# Patient Record
Sex: FEMALE | Race: WHITE | Employment: FULL TIME | URBAN - METROPOLITAN AREA
[De-identification: names, ages, dates, MRNs, and addresses within clinical notes are randomized per-mention and may not be internally consistent; named-entity substitution may affect disease eponyms.]

---

## 2017-02-06 ENCOUNTER — OFFICE VISIT (OUTPATIENT)
Dept: GYNECOLOGY | Age: 62
End: 2017-02-06

## 2017-02-06 VITALS
SYSTOLIC BLOOD PRESSURE: 133 MMHG | DIASTOLIC BLOOD PRESSURE: 68 MMHG | WEIGHT: 227 LBS | BODY MASS INDEX: 44.33 KG/M2 | HEART RATE: 80 BPM

## 2017-02-06 DIAGNOSIS — Z01.419 ENCOUNTER FOR ROUTINE GYNECOLOGICAL EXAMINATION WITH PAPANICOLAOU SMEAR OF CERVIX: Primary | ICD-10-CM

## 2017-02-06 DIAGNOSIS — R10.2 PELVIC PAIN IN FEMALE: ICD-10-CM

## 2017-02-06 DIAGNOSIS — N93.9 VAGINA BLEEDING: ICD-10-CM

## 2017-02-06 PROCEDURE — 99213 OFFICE O/P EST LOW 20 MIN: CPT | Performed by: OBSTETRICS & GYNECOLOGY

## 2017-02-13 ENCOUNTER — HOSPITAL ENCOUNTER (OUTPATIENT)
Dept: ULTRASOUND IMAGING | Age: 62
Discharge: HOME | End: 2017-02-13
Attending: OBSTETRICS & GYNECOLOGY | Admitting: OBSTETRICS & GYNECOLOGY

## 2017-02-13 DIAGNOSIS — R10.2 PELVIC PAIN IN FEMALE: ICD-10-CM

## 2017-02-13 DIAGNOSIS — N93.9 VAGINA BLEEDING: ICD-10-CM

## 2017-02-20 ENCOUNTER — OFFICE VISIT (OUTPATIENT)
Dept: GYNECOLOGY | Age: 62
End: 2017-02-20

## 2017-02-20 VITALS
DIASTOLIC BLOOD PRESSURE: 66 MMHG | SYSTOLIC BLOOD PRESSURE: 130 MMHG | BODY MASS INDEX: 44.14 KG/M2 | WEIGHT: 226 LBS | HEART RATE: 88 BPM

## 2017-02-20 DIAGNOSIS — N95.0 POSTMENOPAUSAL BLEEDING: Primary | ICD-10-CM

## 2017-02-20 PROCEDURE — 99213 OFFICE O/P EST LOW 20 MIN: CPT | Performed by: OBSTETRICS & GYNECOLOGY

## 2017-02-20 RX ORDER — IBUPROFEN 200 MG
200 TABLET ORAL EVERY 6 HOURS PRN
COMMUNITY
End: 2018-08-16

## 2017-03-09 ENCOUNTER — HOSPITAL ENCOUNTER (OUTPATIENT)
Dept: SURGERY | Age: 62
Discharge: HOME | End: 2017-03-09
Attending: OBSTETRICS & GYNECOLOGY | Admitting: OBSTETRICS & GYNECOLOGY

## 2017-03-09 VITALS
BODY MASS INDEX: 44.17 KG/M2 | TEMPERATURE: 97.5 F | WEIGHT: 225 LBS | HEIGHT: 60 IN | DIASTOLIC BLOOD PRESSURE: 68 MMHG | HEART RATE: 73 BPM | OXYGEN SATURATION: 98 % | SYSTOLIC BLOOD PRESSURE: 117 MMHG | RESPIRATION RATE: 16 BRPM

## 2017-03-09 PROCEDURE — 58558 HYSTEROSCOPY BIOPSY: CPT | Performed by: OBSTETRICS & GYNECOLOGY

## 2017-03-09 RX ORDER — MEPERIDINE HYDROCHLORIDE 25 MG/ML
12.5 INJECTION INTRAMUSCULAR; INTRAVENOUS; SUBCUTANEOUS EVERY 5 MIN PRN
Status: DISCONTINUED | OUTPATIENT
Start: 2017-03-09 | End: 2017-03-10 | Stop reason: HOSPADM

## 2017-03-09 RX ORDER — HYDROCODONE BITARTRATE AND ACETAMINOPHEN 5; 325 MG/1; MG/1
1 TABLET ORAL EVERY 6 HOURS PRN
Qty: 30 TABLET | Refills: 0 | Status: SHIPPED | OUTPATIENT
Start: 2017-03-09 | End: 2018-08-16

## 2017-03-09 RX ORDER — ONDANSETRON 2 MG/ML
4 INJECTION INTRAMUSCULAR; INTRAVENOUS
Status: ACTIVE | OUTPATIENT
Start: 2017-03-09 | End: 2017-03-09

## 2017-03-09 RX ORDER — SODIUM CHLORIDE, SODIUM LACTATE, POTASSIUM CHLORIDE, CALCIUM CHLORIDE 600; 310; 30; 20 MG/100ML; MG/100ML; MG/100ML; MG/100ML
INJECTION, SOLUTION INTRAVENOUS CONTINUOUS
Status: DISCONTINUED | OUTPATIENT
Start: 2017-03-09 | End: 2017-03-10 | Stop reason: HOSPADM

## 2017-03-09 RX ORDER — HYDROCODONE BITARTRATE AND ACETAMINOPHEN 5; 325 MG/1; MG/1
1 TABLET ORAL
Status: ACTIVE | OUTPATIENT
Start: 2017-03-09 | End: 2017-03-09

## 2017-03-09 RX ORDER — HYDROMORPHONE HCL 110MG/55ML
0.5 PATIENT CONTROLLED ANALGESIA SYRINGE INTRAVENOUS EVERY 5 MIN PRN
Status: DISCONTINUED | OUTPATIENT
Start: 2017-03-09 | End: 2017-03-10 | Stop reason: HOSPADM

## 2017-03-09 RX ORDER — HYDROMORPHONE HCL 110MG/55ML
0.25 PATIENT CONTROLLED ANALGESIA SYRINGE INTRAVENOUS EVERY 5 MIN PRN
Status: DISCONTINUED | OUTPATIENT
Start: 2017-03-09 | End: 2017-03-10 | Stop reason: HOSPADM

## 2017-03-09 RX ORDER — LIDOCAINE HYDROCHLORIDE 10 MG/ML
1 INJECTION, SOLUTION EPIDURAL; INFILTRATION; INTRACAUDAL; PERINEURAL
Status: COMPLETED | OUTPATIENT
Start: 2017-03-09 | End: 2017-03-09

## 2017-03-09 RX ORDER — SODIUM CHLORIDE 9 MG/ML
INJECTION, SOLUTION INTRAVENOUS CONTINUOUS
Status: DISCONTINUED | OUTPATIENT
Start: 2017-03-09 | End: 2017-03-10 | Stop reason: HOSPADM

## 2017-03-09 RX ADMIN — Medication 0.5 MG: at 15:37

## 2017-03-09 RX ADMIN — Medication 0.5 MG: at 15:23

## 2017-03-09 RX ADMIN — LIDOCAINE HYDROCHLORIDE 0.2 ML: 10 INJECTION, SOLUTION EPIDURAL; INFILTRATION; INTRACAUDAL; PERINEURAL at 13:26

## 2017-03-09 RX ADMIN — SODIUM CHLORIDE, SODIUM LACTATE, POTASSIUM CHLORIDE, CALCIUM CHLORIDE: 600; 310; 30; 20 INJECTION, SOLUTION INTRAVENOUS at 13:26

## 2017-03-09 ASSESSMENT — PAIN SCALES - GENERAL
PAINLEVEL_OUTOF10: 10
PAINLEVEL_OUTOF10: 10
PAINLEVEL_OUTOF10: 9
PAINLEVEL_OUTOF10: 3
PAINLEVEL_OUTOF10: 10
PAINLEVEL_OUTOF10: 3

## 2017-03-09 ASSESSMENT — PAIN - FUNCTIONAL ASSESSMENT: PAIN_FUNCTIONAL_ASSESSMENT: 0-10

## 2017-04-10 ENCOUNTER — OFFICE VISIT (OUTPATIENT)
Dept: GYNECOLOGY | Age: 62
End: 2017-04-10

## 2017-04-10 VITALS
WEIGHT: 231.6 LBS | DIASTOLIC BLOOD PRESSURE: 72 MMHG | SYSTOLIC BLOOD PRESSURE: 128 MMHG | HEART RATE: 86 BPM | BODY MASS INDEX: 45.23 KG/M2

## 2017-04-10 DIAGNOSIS — Z98.890 POST-OPERATIVE STATE: Primary | ICD-10-CM

## 2017-04-10 PROCEDURE — 99024 POSTOP FOLLOW-UP VISIT: CPT | Performed by: OBSTETRICS & GYNECOLOGY

## 2017-06-05 ENCOUNTER — OFFICE VISIT (OUTPATIENT)
Dept: GYNECOLOGY | Age: 62
End: 2017-06-05

## 2017-06-05 VITALS
BODY MASS INDEX: 45.7 KG/M2 | WEIGHT: 234 LBS | DIASTOLIC BLOOD PRESSURE: 59 MMHG | SYSTOLIC BLOOD PRESSURE: 127 MMHG | HEART RATE: 80 BPM

## 2017-06-05 DIAGNOSIS — Z01.419 ENCOUNTER FOR ROUTINE GYNECOLOGICAL EXAMINATION WITH PAPANICOLAOU SMEAR OF CERVIX: Primary | ICD-10-CM

## 2017-06-05 LAB
HEMOCCULT STL QL: NEGATIVE
HEMOCCULT STL QL: NORMAL
HEMOCCULT STL QL: NORMAL

## 2017-06-05 PROCEDURE — 99396 PREV VISIT EST AGE 40-64: CPT | Performed by: OBSTETRICS & GYNECOLOGY

## 2017-06-08 LAB
HPV COMMENT: NORMAL
HPV TYPE 16: NOT DETECTED
HPV TYPE 18: NOT DETECTED
HPVOH (OTHER TYPES): NOT DETECTED

## 2017-06-09 LAB
C. TRACHOMATIS DNA,THIN PREP: NEGATIVE
N. GONORRHOEAE DNA, THIN PREP: NEGATIVE

## 2018-07-02 ENCOUNTER — HOSPITAL ENCOUNTER (OUTPATIENT)
Dept: OTHER | Age: 63
Discharge: HOME | End: 2018-07-31
Attending: OBSTETRICS & GYNECOLOGY | Admitting: OBSTETRICS & GYNECOLOGY

## 2018-07-02 ENCOUNTER — OFFICE VISIT (OUTPATIENT)
Dept: GYNECOLOGY | Age: 63
End: 2018-07-02

## 2018-07-02 VITALS
WEIGHT: 216.6 LBS | SYSTOLIC BLOOD PRESSURE: 126 MMHG | DIASTOLIC BLOOD PRESSURE: 69 MMHG | HEART RATE: 68 BPM | BODY MASS INDEX: 42.3 KG/M2

## 2018-07-02 DIAGNOSIS — Z01.419 WELL WOMAN EXAM WITH ROUTINE GYNECOLOGICAL EXAM: Primary | ICD-10-CM

## 2018-07-02 DIAGNOSIS — N95.0 POSTMENOPAUSAL BLEEDING: ICD-10-CM

## 2018-07-02 PROCEDURE — 99396 PREV VISIT EST AGE 40-64: CPT | Performed by: OBSTETRICS & GYNECOLOGY

## 2018-07-02 NOTE — PROGRESS NOTES
Subjective:      Patient ID: Yolanda Kurtz is a 65 y.o. female. Other     pts here for annual gyn exam.  Notes spotting for 2 days last month. Denies other complaints.  used. Review of Systems  Pertinent review of systems items discussed above. All others systems items not discussed above were negative. Objective:   Physical Exam   Constitutional: She is oriented to person, place, and time. She appears well-developed and well-nourished. HENT:   Head: Normocephalic and atraumatic. Neck: No tracheal deviation present. No thyromegaly present. Cardiovascular: Normal rate, regular rhythm and normal heart sounds. No murmur heard. Pulmonary/Chest: Effort normal and breath sounds normal. No respiratory distress. She has no wheezes. She has no rales. Right breast exhibits no mass, no nipple discharge and no skin change. Left breast exhibits no mass, no nipple discharge and no skin change. Abdominal: Soft. She exhibits no distension and no mass. There is no tenderness. There is no rebound. Genitourinary: Rectum normal, vagina normal and uterus normal. Rectal exam shows no external hemorrhoid, no mass and guaiac negative stool. No breast tenderness (no masses), discharge or bleeding. There is no lesion on the right labia. There is no lesion on the left labia. Uterus is not deviated, not enlarged, not fixed and not tender. Cervix exhibits no motion tenderness, no discharge and no friability. Right adnexum displays no mass and no tenderness. Left adnexum displays no mass and no tenderness. No foreign body in the vagina. No vaginal discharge found. Genitourinary Comments: Pap performed. Musculoskeletal: Normal range of motion. Lymphadenopathy:     She has no cervical adenopathy. Neurological: She is alert and oriented to person, place, and time. Assessment:      Normal gyn exam, postmen spotting      Plan:      Pelvic US. Call with results. Mammogram ordered.

## 2018-07-03 LAB — OCCULT BLOOD SCREENING: NORMAL

## 2018-07-10 ENCOUNTER — HOSPITAL ENCOUNTER (OUTPATIENT)
Dept: ULTRASOUND IMAGING | Age: 63
Discharge: HOME | End: 2018-07-10
Attending: OBSTETRICS & GYNECOLOGY | Admitting: OBSTETRICS & GYNECOLOGY

## 2018-07-10 DIAGNOSIS — Z12.31 ENCOUNTER FOR SCREENING MAMMOGRAM FOR BREAST CANCER: ICD-10-CM

## 2018-07-10 DIAGNOSIS — N95.0 POSTMENOPAUSAL BLEEDING: ICD-10-CM

## 2018-07-23 ENCOUNTER — OFFICE VISIT (OUTPATIENT)
Dept: GYNECOLOGY | Age: 63
End: 2018-07-23

## 2018-07-23 VITALS
SYSTOLIC BLOOD PRESSURE: 120 MMHG | HEART RATE: 78 BPM | BODY MASS INDEX: 42.65 KG/M2 | DIASTOLIC BLOOD PRESSURE: 65 MMHG | WEIGHT: 218.4 LBS

## 2018-07-23 DIAGNOSIS — N95.0 POSTMENOPAUSAL BLEEDING: Primary | ICD-10-CM

## 2018-07-23 PROCEDURE — 99213 OFFICE O/P EST LOW 20 MIN: CPT | Performed by: OBSTETRICS & GYNECOLOGY

## 2018-07-23 RX ORDER — MISOPROSTOL 100 UG/1
100 TABLET ORAL 3 TIMES DAILY
Qty: 60 TABLET | Refills: 3 | Status: SHIPPED | OUTPATIENT
Start: 2018-07-23 | End: 2019-09-05 | Stop reason: ALTCHOICE

## 2018-07-23 NOTE — PROGRESS NOTES
pts here to discuss US showing stripe of 7 mm.  used. I recommended a hyst d and c with myosure. I discussed the technique, recovery, and risks with patient. They include but are not limited to bleeding, infection, damage to internal organs (e.g., bladder, bowel, ureters). Patient voiced understanding and agrees to proceed. Written h and p performed. rx cytotec. 15 min >50% of time was spent discussing findings, management, and treatment options.

## 2018-08-01 ENCOUNTER — HOSPITAL ENCOUNTER (OUTPATIENT)
Dept: OTHER | Age: 63
Discharge: HOME | End: 2018-08-31
Attending: OBSTETRICS & GYNECOLOGY | Admitting: OBSTETRICS & GYNECOLOGY

## 2018-08-16 ENCOUNTER — HOSPITAL ENCOUNTER (OUTPATIENT)
Dept: SURGERY | Age: 63
Discharge: HOME | End: 2018-08-16
Attending: OBSTETRICS & GYNECOLOGY | Admitting: OBSTETRICS & GYNECOLOGY

## 2018-08-16 VITALS
BODY MASS INDEX: 42.8 KG/M2 | RESPIRATION RATE: 16 BRPM | OXYGEN SATURATION: 99 % | DIASTOLIC BLOOD PRESSURE: 75 MMHG | SYSTOLIC BLOOD PRESSURE: 132 MMHG | TEMPERATURE: 98 F | HEIGHT: 60 IN | HEART RATE: 68 BPM | WEIGHT: 218 LBS

## 2018-08-16 DIAGNOSIS — N95.0 POSTMENOPAUSAL BLEEDING: Primary | ICD-10-CM

## 2018-08-16 LAB
GLUCOSE BLD-MCNC: 87 MG/DL (ref 70–99)
GLUCOSE BLD-MCNC: 88 MG/DL (ref 70–99)
PERFORMED ON: NORMAL
PERFORMED ON: NORMAL

## 2018-08-16 PROCEDURE — 58558 HYSTEROSCOPY BIOPSY: CPT | Performed by: OBSTETRICS & GYNECOLOGY

## 2018-08-16 RX ORDER — SODIUM CHLORIDE 9 MG/ML
INJECTION, SOLUTION INTRAVENOUS CONTINUOUS
Status: DISCONTINUED | OUTPATIENT
Start: 2018-08-16 | End: 2018-08-17 | Stop reason: HOSPADM

## 2018-08-16 RX ORDER — PREDNISONE 20 MG/1
20 TABLET ORAL DAILY
COMMUNITY
Start: 2018-08-02 | End: 2019-09-05 | Stop reason: ALTCHOICE

## 2018-08-16 RX ORDER — OXYCODONE HYDROCHLORIDE 5 MG/1
10 TABLET ORAL PRN
Status: ACTIVE | OUTPATIENT
Start: 2018-08-16 | End: 2018-08-16

## 2018-08-16 RX ORDER — OXYCODONE HYDROCHLORIDE AND ACETAMINOPHEN 5; 325 MG/1; MG/1
1 TABLET ORAL EVERY 6 HOURS PRN
Qty: 28 TABLET | Refills: 0 | Status: SHIPPED | OUTPATIENT
Start: 2018-08-16 | End: 2018-08-23

## 2018-08-16 RX ORDER — OXYCODONE HYDROCHLORIDE 5 MG/1
5 TABLET ORAL PRN
Status: ACTIVE | OUTPATIENT
Start: 2018-08-16 | End: 2018-08-16

## 2018-08-16 RX ORDER — HYDROMORPHONE HCL 110MG/55ML
0.5 PATIENT CONTROLLED ANALGESIA SYRINGE INTRAVENOUS EVERY 5 MIN PRN
Status: DISCONTINUED | OUTPATIENT
Start: 2018-08-16 | End: 2018-08-17 | Stop reason: HOSPADM

## 2018-08-16 RX ORDER — MEPERIDINE HYDROCHLORIDE 25 MG/ML
12.5 INJECTION INTRAMUSCULAR; INTRAVENOUS; SUBCUTANEOUS EVERY 5 MIN PRN
Status: DISCONTINUED | OUTPATIENT
Start: 2018-08-16 | End: 2018-08-17 | Stop reason: HOSPADM

## 2018-08-16 RX ORDER — ONDANSETRON 2 MG/ML
4 INJECTION INTRAMUSCULAR; INTRAVENOUS
Status: ACTIVE | OUTPATIENT
Start: 2018-08-16 | End: 2018-08-16

## 2018-08-16 RX ORDER — LIDOCAINE HYDROCHLORIDE 10 MG/ML
1 INJECTION, SOLUTION EPIDURAL; INFILTRATION; INTRACAUDAL; PERINEURAL
Status: ACTIVE | OUTPATIENT
Start: 2018-08-16 | End: 2018-08-16

## 2018-08-16 RX ORDER — ONDANSETRON 2 MG/ML
4 INJECTION INTRAMUSCULAR; INTRAVENOUS PRN
Status: DISCONTINUED | OUTPATIENT
Start: 2018-08-16 | End: 2018-08-17 | Stop reason: HOSPADM

## 2018-08-16 RX ORDER — HYDROMORPHONE HCL 110MG/55ML
0.25 PATIENT CONTROLLED ANALGESIA SYRINGE INTRAVENOUS EVERY 5 MIN PRN
Status: DISCONTINUED | OUTPATIENT
Start: 2018-08-16 | End: 2018-08-17 | Stop reason: HOSPADM

## 2018-08-16 RX ORDER — FENTANYL CITRATE 50 UG/ML
50 INJECTION, SOLUTION INTRAMUSCULAR; INTRAVENOUS EVERY 5 MIN PRN
Status: DISCONTINUED | OUTPATIENT
Start: 2018-08-16 | End: 2018-08-17 | Stop reason: HOSPADM

## 2018-08-16 RX ORDER — FENTANYL CITRATE 50 UG/ML
25 INJECTION, SOLUTION INTRAMUSCULAR; INTRAVENOUS EVERY 5 MIN PRN
Status: DISCONTINUED | OUTPATIENT
Start: 2018-08-16 | End: 2018-08-17 | Stop reason: HOSPADM

## 2018-08-16 RX ORDER — SODIUM CHLORIDE 0.9 % (FLUSH) 0.9 %
10 SYRINGE (ML) INJECTION EVERY 12 HOURS SCHEDULED
Status: DISCONTINUED | OUTPATIENT
Start: 2018-08-16 | End: 2018-08-17 | Stop reason: HOSPADM

## 2018-08-16 RX ORDER — SODIUM CHLORIDE 0.9 % (FLUSH) 0.9 %
10 SYRINGE (ML) INJECTION PRN
Status: DISCONTINUED | OUTPATIENT
Start: 2018-08-16 | End: 2018-08-17 | Stop reason: HOSPADM

## 2018-08-16 RX ORDER — LIDOCAINE HYDROCHLORIDE 10 MG/ML
0.5 INJECTION, SOLUTION EPIDURAL; INFILTRATION; INTRACAUDAL; PERINEURAL ONCE
Status: DISCONTINUED | OUTPATIENT
Start: 2018-08-16 | End: 2018-08-17 | Stop reason: HOSPADM

## 2018-08-16 RX ORDER — AMOXICILLIN AND CLAVULANATE POTASSIUM 875; 125 MG/1; MG/1
1 TABLET, FILM COATED ORAL 2 TIMES DAILY
COMMUNITY
Start: 2018-08-02 | End: 2019-09-05 | Stop reason: ALTCHOICE

## 2018-08-16 RX ORDER — MELOXICAM 15 MG/1
15 TABLET ORAL DAILY
COMMUNITY
Start: 2018-08-02 | End: 2019-09-05 | Stop reason: ALTCHOICE

## 2018-08-16 RX ORDER — SODIUM CHLORIDE, SODIUM LACTATE, POTASSIUM CHLORIDE, CALCIUM CHLORIDE 600; 310; 30; 20 MG/100ML; MG/100ML; MG/100ML; MG/100ML
INJECTION, SOLUTION INTRAVENOUS CONTINUOUS
Status: DISCONTINUED | OUTPATIENT
Start: 2018-08-16 | End: 2018-08-17 | Stop reason: HOSPADM

## 2018-08-16 RX ADMIN — FENTANYL CITRATE 25 MCG: 50 INJECTION, SOLUTION INTRAMUSCULAR; INTRAVENOUS at 15:53

## 2018-08-16 RX ADMIN — SODIUM CHLORIDE: 9 INJECTION, SOLUTION INTRAVENOUS at 13:25

## 2018-08-16 ASSESSMENT — PAIN - FUNCTIONAL ASSESSMENT: PAIN_FUNCTIONAL_ASSESSMENT: 0-10

## 2018-08-16 ASSESSMENT — PAIN DESCRIPTION - DESCRIPTORS: DESCRIPTORS: CRAMPING

## 2018-08-16 ASSESSMENT — PAIN SCALES - GENERAL: PAINLEVEL_OUTOF10: 5

## 2018-08-16 ASSESSMENT — PAIN DESCRIPTION - LOCATION: LOCATION: ABDOMEN

## 2018-08-16 ASSESSMENT — PAIN DESCRIPTION - PAIN TYPE: TYPE: SURGICAL PAIN

## 2018-08-16 NOTE — H&P
I have reviewed the history and physical and examined the patient and find no relevant changes. I have reviewed with the patient and/or family the risks, benefits, and alternatives to the procedure.     Reema Quigley MD  4/50/2047

## 2018-08-16 NOTE — PROGRESS NOTES
Received from or - drowsy,nasal cannula,abdomen soft non tender,rosario pad with stripe of red drainage no clots noted,nikki paws,vss.

## 2018-08-16 NOTE — ANESTHESIA PRE-OP
[facility] Anesthesiology  Pre-Anesthesia Evaluation/Consultation       Name:  Yeison Gonzalez                                         Age:  65 y.o. MRN:    [id number]           Procedure (Scheduled): DILATATION AND CURETTAGE HYSTEROSCOPY CAUTERY ABLATION   Surgeon:     Dr. Robinson Madrid MD     No Known Allergies  Patient Active Problem List   Diagnosis    Postmenopausal bleeding     No past medical history on file. Past Surgical History:   Procedure Laterality Date    BUNIONECTOMY      DILATION AND CURETTAGE OF UTERUS  4/2/12    Dilatation and curetage, hysteroscopy, endometrial biopsy    DILATION AND CURETTAGE OF UTERUS  03/09/2017    HYSTEROSCOPY DILATATION AND CURETTAGE MYOSURE    HYSTEROSCOPY       Social History   Substance Use Topics    Smoking status: Never Smoker    Smokeless tobacco: Never Used    Alcohol use No       Prior to Admission medications    Medication Sig Start Date End Date Taking? Authorizing Provider   misoprostol (CYTOTEC) 100 MCG tablet Take 1 tablet by mouth 3 times daily 6/55/93   Aletha Arce MD   naproxen (NAPROSYN) 500 MG tablet Take 500 mg by mouth 2 times daily (with meals)    Historical Provider, MD   HYDROcodone-acetaminophen (NORCO) 5-325 MG per tablet Take 1 tablet by mouth every 6 hours as needed for Pain (may change to pain pill ordered by surgeon) . 8/4/38   Aletha Arce MD   ibuprofen (ADVIL;MOTRIN) 200 MG tablet Take 200 mg by mouth every 6 hours as needed for Pain    Historical Provider, MD   aspirin 325 MG EC tablet Take 1 tablet by mouth every 6 hours as needed for Pain 7/25/15   DOUGLAS De La Cruz   guaiFENesin (ROBITUSSIN) 100 MG/5ML SOLN oral solution Take 10 mLs by mouth every 4 hours as needed for Cough. Historical Provider, MD   UNABLE TO FIND OTC VICKS COLD MED PRN    Historical Provider, MD   albuterol (PROAIR HFA) 108 (90 BASE) MCG/ACT inhaler Inhale 2 puffs into the lungs every 6 hours as needed for Shortness of Breath. GFRAA >60 07/25/2015    LABGLOM >60 07/25/2015    GLUCOSE 95 07/25/2015    CALCIUM 9.0 07/25/2015       BMP    Lab Results   Component Value Date     07/25/2015    K 4.7 07/25/2015     07/25/2015    CO2 28 07/25/2015    BUN 13 07/25/2015    CREATININE 0.6 07/25/2015    CALCIUM 9.0 07/25/2015    GFRAA >60 07/25/2015    LABGLOM >60 07/25/2015    GLUCOSE 95 07/25/2015       Coags   No results found for: PROTIME, INR, APTT    HCG (If Applicable) No results found for: PREGTESTUR, PREGSERUM, HCG, HCGQUANT     ABGs  No results found for: PHART, PO2ART, IRO4LNW, ZXZ4GEP, BEART, E5GVTWXZ     Type & Screen (If Applicable)  Lab Results   Component Value Date    LABABO B 04/02/2012    LABRH Positive 04/02/2012         POCGlucose  No results for input(s): GLUCOSE in the last 72 hours. NPO Status  > 8 hours                       BMI  There is no height or weight on file to calculate BMI. Estimated body mass index is 42.65 kg/m² as calculated from the following:    Height as of 3/9/17: 5' (1.524 m). Weight as of 7/23/18: 218 lb 6.4 oz (99.1 kg). Additional Testing (Echo, Stress, ECG, PFTs, etc)        Anesthesia Evaluation  Patient summary reviewed and Nursing notes reviewed no history of anesthetic complications:   Airway: Mallampati: II  TM distance: >3 FB   Neck ROM: full  Mouth opening: > = 3 FB Dental: normal exam         Pulmonary:Negative Pulmonary ROS and normal exam                               Cardiovascular:Negative CV ROS  Exercise tolerance: good (>4 METS),       (-)  angina, PND and  GUTIÉRREZ    ECG reviewed  Rhythm: regular  Rate: normal           Beta Blocker:  Not on Beta Blocker         Neuro/Psych:   Negative Neuro/Psych ROS              GI/Hepatic/Renal: Neg GI/Hepatic/Renal ROS            Endo/Other: Negative Endo/Other ROS                    Abdominal:           Vascular: negative vascular ROS.                                        Anesthesia Plan      general     ASA 1     (Plan for GETA

## 2018-08-16 NOTE — PROGRESS NOTES
Discharge instructions reviewed with patient/responsible adult. All home medications have been reviewed, questions answered and patient and friend verbalized understanding. Discharge instructions signed and copies given. Patient discharged per w/c with belongings.

## 2018-08-17 NOTE — OP NOTE
[facility]                      [facility], [address] [place] [address]                                 OPERATIVE REPORT    PATIENT NAME: Rashida Fu                       :        1955  MED REC NO:   [id number]                          ROOM:  ACCOUNT NO:   [de-identified]                          ADMIT DATE: 2018  PROVIDER:     Cayla Isbell MD    DATE OF PROCEDURE:  2018    INDICATION:  The patient is a 27-year-old female. She is  0. She  presents with postmenopausal bleeding for two days. Ultrasound showed an  endometrial stripe of 7.5 mm. I offered the patient the option of a  hysteroscope, D and C. I discussed the technique, the recovery and the  risks, they include but are not limited to bleeding, infection, damage to  her internal organs such as bladder and bowel, along with need for  subsequent reparative surgery if damage occurred. The patient voiced  understanding and agreed to proceed with the surgery. PREOPERATIVE DIAGNOSIS:  Postmenopausal bleeding. POSTOPERATIVE DIAGNOSIS:  Postmenopausal bleeding. OPERATION PERFORMED:  Hysteroscope, D and C using MyoSure. SURGEON:  Cayla Isbell MD    ANESTHESIA:  General endotracheal anesthetic. ESTIMATED BLOOD LOSS:  Less than 50 mL. FLUID DEFICIT:  Less than 200 mL. DRAINS:  None. COMPLICATIONS:  None. DISPOSITION:  Stable to recovery room. OPERATIVE PROCEDURE:  The patient was taken to the operating room and was  prepped and draped in normal sterile fashion in the dorsal lithotomy  position. Single-tooth tenaculum was used to grasp the anterior aspect of  the cervix. Uterus was sounded to 8 cm and cervix was dilated to 8 mm. Hysteroscope was inserted. There was a thin endometrial lining seen. I  was unable to appreciate any endometrial polyps. I was able to visualize  both tubal ostia.   Representative sampling of the entire endometrial cavity  was taken using the MyoSure under direct visualization. Good hemostasis  was noted. All instruments were removed from the patient's pelvic organ. She was taken out of lithotomy, allowed to awaken from anesthesia, after  which time she was transferred from the operating room to the recovery room  where she went in stable condition. All sponge, lap, and needles were  correct x2.         Wendy Glaser MD    D: 47/29/6792 15:30:42       T: 08/17/2018 2:18:08     [email or web address]  Job#: [id number]     Doc#: [id number]

## 2018-08-21 NOTE — ANESTHESIA POST-OP
Anesthesia Post-op Note    Patient: Tatyana Barrera  MRN: [id number]  YOB: 1955  Date of evaluation: 8/21/2018  Time:  9:17 AM     Procedure(s) Performed:     Last Vitals: /75   Pulse 68   Temp 98 °F (36.7 °C) (Temporal)   Resp 16   Ht 5' (1.524 m)   Wt 218 lb (98.9 kg)   SpO2 99%   BMI 42.58 kg/m²     Can Phase I: Can Score: 10    Can Phase II: Can Score: 10    Anesthesia Post Evaluation    Final anesthesia type: general  Patient participation: complete - patient participated  Level of consciousness: awake and alert  Pain score: 0  Airway patency: patent  Nausea & Vomiting: no nausea and no vomiting  Complications: no  Cardiovascular status: blood pressure returned to baseline  Respiratory status: acceptable  Hydration status: euvolemic        Ade Christine DO  9:17 AM

## 2018-10-01 ENCOUNTER — OFFICE VISIT (OUTPATIENT)
Dept: GYNECOLOGY | Age: 63
End: 2018-10-01

## 2018-10-01 VITALS
WEIGHT: 225.4 LBS | BODY MASS INDEX: 44.02 KG/M2 | HEART RATE: 76 BPM | SYSTOLIC BLOOD PRESSURE: 128 MMHG | DIASTOLIC BLOOD PRESSURE: 66 MMHG

## 2018-10-01 DIAGNOSIS — N95.0 POSTMENOPAUSAL BLEEDING: ICD-10-CM

## 2018-10-01 PROCEDURE — 99213 OFFICE O/P EST LOW 20 MIN: CPT | Performed by: OBSTETRICS & GYNECOLOGY

## 2018-10-01 PROCEDURE — 99211 OFF/OP EST MAY X REQ PHY/QHP: CPT | Performed by: OBSTETRICS & GYNECOLOGY

## 2019-08-26 ENCOUNTER — HOSPITAL ENCOUNTER (EMERGENCY)
Age: 64
Discharge: HOME | End: 2019-08-26
Attending: EMERGENCY MEDICINE
Payer: COMMERCIAL

## 2019-08-26 ENCOUNTER — APPOINTMENT (OUTPATIENT)
Dept: CT IMAGING | Age: 64
End: 2019-08-26
Payer: COMMERCIAL

## 2019-08-26 VITALS
OXYGEN SATURATION: 98 % | TEMPERATURE: 98.1 F | SYSTOLIC BLOOD PRESSURE: 134 MMHG | DIASTOLIC BLOOD PRESSURE: 71 MMHG | RESPIRATION RATE: 16 BRPM | HEART RATE: 67 BPM | BODY MASS INDEX: 47.12 KG/M2 | HEIGHT: 60 IN | WEIGHT: 240 LBS

## 2019-08-26 DIAGNOSIS — R51.9 ACUTE NONINTRACTABLE HEADACHE, UNSPECIFIED HEADACHE TYPE: Primary | ICD-10-CM

## 2019-08-26 LAB
A/G RATIO: 1.2 (ref 1.1–2.2)
ALBUMIN SERPL-MCNC: 4 G/DL (ref 3.4–5)
ALP BLD-CCNC: 84 U/L (ref 40–129)
ALT SERPL-CCNC: 7 U/L (ref 10–40)
ANION GAP SERPL CALCULATED.3IONS-SCNC: 11 MMOL/L (ref 3–16)
AST SERPL-CCNC: 17 U/L (ref 15–37)
BASOPHILS ABSOLUTE: 0.1 K/UL (ref 0–0.2)
BASOPHILS RELATIVE PERCENT: 1 %
BILIRUB SERPL-MCNC: <0.2 MG/DL (ref 0–1)
BUN BLDV-MCNC: 15 MG/DL (ref 7–20)
CALCIUM SERPL-MCNC: 8.8 MG/DL (ref 8.3–10.6)
CHLORIDE BLD-SCNC: 102 MMOL/L (ref 99–110)
CO2: 26 MMOL/L (ref 21–32)
CREAT SERPL-MCNC: 0.7 MG/DL (ref 0.6–1.2)
EOSINOPHILS ABSOLUTE: 0.1 K/UL (ref 0–0.6)
EOSINOPHILS RELATIVE PERCENT: 1 %
GFR AFRICAN AMERICAN: >60
GFR NON-AFRICAN AMERICAN: >60
GLOBULIN: 3.4 G/DL
GLUCOSE BLD-MCNC: 95 MG/DL (ref 70–99)
HCT VFR BLD CALC: 40.5 % (ref 36–48)
HEMOGLOBIN: 13 G/DL (ref 12–16)
LYMPHOCYTES ABSOLUTE: 1.8 K/UL (ref 1–5.1)
LYMPHOCYTES RELATIVE PERCENT: 29.3 %
MCH RBC QN AUTO: 26.5 PG (ref 26–34)
MCHC RBC AUTO-ENTMCNC: 32 G/DL (ref 31–36)
MCV RBC AUTO: 82.6 FL (ref 80–100)
MONOCYTES ABSOLUTE: 0.6 K/UL (ref 0–1.3)
MONOCYTES RELATIVE PERCENT: 8.9 %
NEUTROPHILS ABSOLUTE: 3.7 K/UL (ref 1.7–7.7)
NEUTROPHILS RELATIVE PERCENT: 59.8 %
PDW BLD-RTO: 15.3 % (ref 12.4–15.4)
PLATELET # BLD: 227 K/UL (ref 135–450)
PMV BLD AUTO: 8.9 FL (ref 5–10.5)
POTASSIUM SERPL-SCNC: 4.2 MMOL/L (ref 3.5–5.1)
RBC # BLD: 4.91 M/UL (ref 4–5.2)
SODIUM BLD-SCNC: 139 MMOL/L (ref 136–145)
TOTAL PROTEIN: 7.4 G/DL (ref 6.4–8.2)
WBC # BLD: 6.2 K/UL (ref 4–11)

## 2019-08-26 PROCEDURE — 96374 THER/PROPH/DIAG INJ IV PUSH: CPT

## 2019-08-26 PROCEDURE — 96375 TX/PRO/DX INJ NEW DRUG ADDON: CPT

## 2019-08-26 PROCEDURE — 99284 EMERGENCY DEPT VISIT MOD MDM: CPT

## 2019-08-26 PROCEDURE — 6360000002 HC RX W HCPCS: Performed by: PHYSICIAN ASSISTANT

## 2019-08-26 PROCEDURE — 80053 COMPREHEN METABOLIC PANEL: CPT

## 2019-08-26 PROCEDURE — 93005 ELECTROCARDIOGRAM TRACING: CPT | Performed by: PHYSICIAN ASSISTANT

## 2019-08-26 PROCEDURE — 6370000000 HC RX 637 (ALT 250 FOR IP): Performed by: PHYSICIAN ASSISTANT

## 2019-08-26 PROCEDURE — 70450 CT HEAD/BRAIN W/O DYE: CPT

## 2019-08-26 PROCEDURE — 85025 COMPLETE CBC W/AUTO DIFF WBC: CPT

## 2019-08-26 PROCEDURE — 2580000003 HC RX 258: Performed by: PHYSICIAN ASSISTANT

## 2019-08-26 PROCEDURE — 96361 HYDRATE IV INFUSION ADD-ON: CPT

## 2019-08-26 RX ORDER — 0.9 % SODIUM CHLORIDE 0.9 %
1000 INTRAVENOUS SOLUTION INTRAVENOUS ONCE
Status: COMPLETED | OUTPATIENT
Start: 2019-08-26 | End: 2019-08-26

## 2019-08-26 RX ORDER — DIPHENHYDRAMINE HYDROCHLORIDE 50 MG/ML
12.5 INJECTION INTRAMUSCULAR; INTRAVENOUS ONCE
Status: COMPLETED | OUTPATIENT
Start: 2019-08-26 | End: 2019-08-26

## 2019-08-26 RX ORDER — METOCLOPRAMIDE HYDROCHLORIDE 5 MG/ML
10 INJECTION INTRAMUSCULAR; INTRAVENOUS ONCE
Status: COMPLETED | OUTPATIENT
Start: 2019-08-26 | End: 2019-08-26

## 2019-08-26 RX ORDER — ACETAMINOPHEN 500 MG
1000 TABLET ORAL ONCE
Status: COMPLETED | OUTPATIENT
Start: 2019-08-26 | End: 2019-08-26

## 2019-08-26 RX ADMIN — SODIUM CHLORIDE 1000 ML: 9 INJECTION, SOLUTION INTRAVENOUS at 16:03

## 2019-08-26 RX ADMIN — DIPHENHYDRAMINE HYDROCHLORIDE 12.5 MG: 50 INJECTION, SOLUTION INTRAMUSCULAR; INTRAVENOUS at 16:03

## 2019-08-26 RX ADMIN — METOCLOPRAMIDE 10 MG: 5 INJECTION, SOLUTION INTRAMUSCULAR; INTRAVENOUS at 16:03

## 2019-08-26 RX ADMIN — ACETAMINOPHEN 1000 MG: 500 TABLET, FILM COATED ORAL at 15:51

## 2019-08-26 ASSESSMENT — ENCOUNTER SYMPTOMS
ABDOMINAL PAIN: 0
NAUSEA: 0
VOMITING: 0
RHINORRHEA: 0
SHORTNESS OF BREATH: 0
COUGH: 0
DIARRHEA: 0

## 2019-08-26 ASSESSMENT — PAIN DESCRIPTION - PAIN TYPE: TYPE: ACUTE PAIN

## 2019-08-26 ASSESSMENT — PAIN SCALES - GENERAL: PAINLEVEL_OUTOF10: 3

## 2019-08-26 ASSESSMENT — PAIN DESCRIPTION - LOCATION: LOCATION: HEAD

## 2019-08-26 NOTE — ED PROVIDER NOTES
intravenous contrast. Dose modulation, iterative reconstruction, and/or weight based adjustment of the mA/kV was utilized to reduce the radiation dose to as low as reasonably achievable. COMPARISON: 07/25/2015 HISTORY: ORDERING SYSTEM PROVIDED HISTORY: headache TECHNOLOGIST PROVIDED HISTORY: Has a \"code stroke\" or \"stroke alert\" been called? ->No Reason for Exam: Hypertension (Pt. in per squad with report of HA and high BP. Pt. didn't want transported per squad report.) Acuity: Acute Type of Exam: Initial FINDINGS: BRAIN/VENTRICLES: There is no acute intracranial hemorrhage, mass effect or midline shift. No abnormal extra-axial fluid collection. The gray-white differentiation is maintained without evidence of an acute infarct. There is no evidence of hydrocephalus. ORBITS: The visualized portion of the orbits demonstrate no acute abnormality. SINUSES: The visualized paranasal sinuses and mastoid air cells demonstrate no acute abnormality. SOFT TISSUES/SKULL:  No acute abnormality of the visualized skull or soft tissues. No acute intracranial abnormality.            PROCEDURES   Unless otherwise noted below, none     Procedures    CRITICAL CARE TIME   N/A    CONSULTS:  None      EMERGENCY DEPARTMENT COURSE and DIFFERENTIAL DIAGNOSIS/MDM:   Vitals:    Vitals:    08/26/19 1420 08/26/19 1609 08/26/19 1756   BP: (!) 147/82 137/77 134/71   Pulse: 80 72 67   Resp: 16  16   Temp: 98.1 °F (36.7 °C)     TempSrc: Oral     SpO2: 97% 98% 98%   Weight: 240 lb (108.9 kg)     Height: 5' (1.524 m)         Patient was given thefollowing medications:  Medications   0.9 % sodium chloride bolus (0 mLs Intravenous Stopped 8/26/19 1724)   acetaminophen (TYLENOL) tablet 1,000 mg (1,000 mg Oral Given 8/26/19 1551)   metoclopramide (REGLAN) injection 10 mg (10 mg Intravenous Given 8/26/19 1603)   diphenhydrAMINE (BENADRYL) injection 12.5 mg (12.5 mg Intravenous Given 8/26/19 1603)     The patient presents to the emergency department today for evaluation for a headache. The patient states that around 1 PM this afternoon she noticed that she had a \"burning\" sensation to her left ear into the left side of her face. The patient states that her blood pressure was taken at that time, and it was \"791\" systolic. The patient states that overall she is doing better, she is only rating her headache as a 3/10, this is not the worst headache of her life, this is not an atypical headache. She denies any numbness, tingling or weakness. She denies any chest pain or shortness of breath. She denies any abdominal pain, nausea, vomiting or diarrhea. She denies any fever chills but no cough or congestion. No urinary symptoms. No fall or head injury, she has no other complaints at this time. On physical exam, there are no focal neurological deficits. CBC shows no evidence of leukocytosis or anemia. CMP is unremarkable. EKG documented by my attending, please see his note for further details. CT of head shows no acute process. Patient was given fluids as well as medications in the ED, blood pressure discharge is 134/71. As this is not the worst headache of her life this is not an atypical headache, I do not feel that she needs any further work-up at this time, I believe this can be managed as an outpatient. Do not feel that she needs a CT/LP at this time as my suspicions at this time for acute internal hemorrhage, subarachnoid hemorrhage, epidural hematoma, subdural hematoma, meningitis, acute CVA, TIA or other emergent etiology. She is to obtain follow-up with her primary care physician within 2 to 3 days for reevaluation. She is to return to the ED for any new or worsening symptoms. Patient voiced understanding is agreeable plan. Stable for discharge      FINAL IMPRESSION      1.  Acute nonintractable headache, unspecified headache type          DISPOSITION/PLAN   DISPOSITION Decision To Discharge 08/26/2019 05:37:46 PM      PATIENT REFERREDTO:  Referring Not In System    Schedule an appointment as soon as possible for a visit in 2 days      [facility] Emergency Department  [address] [place] [address]  [phone]    As needed, If symptoms worsen      DISCHARGE MEDICATIONS:  Discharge Medication List as of 8/26/2019  6:03 PM          DISCONTINUED MEDICATIONS:  Discharge Medication List as of 8/26/2019  6:03 PM                 (Please note that portions ofthis note were completed with a voice recognition program.  Efforts were made to edit the dictations but occasionally words are mis-transcribed.)    Samantha Mckeon PA-C (electronically signed)           Samantha Mckeon PA-C  08/26/19 0632

## 2019-08-26 NOTE — ED PROVIDER NOTES
extra-axial fluid collection. The gray-white differentiation is maintained without evidence of an acute infarct. There is no evidence of hydrocephalus. ORBITS: The visualized portion of the orbits demonstrate no acute abnormality. SINUSES: The visualized paranasal sinuses and mastoid air cells demonstrate no acute abnormality. SOFT TISSUES/SKULL:  No acute abnormality of the visualized skull or soft tissues. No acute intracranial abnormality.         Josephine Thomas MD  08/26/19 3264

## 2019-08-27 LAB
EKG ATRIAL RATE: 79 BPM
EKG DIAGNOSIS: NORMAL
EKG P AXIS: 58 DEGREES
EKG P-R INTERVAL: 180 MS
EKG Q-T INTERVAL: 404 MS
EKG QRS DURATION: 80 MS
EKG QTC CALCULATION (BAZETT): 463 MS
EKG R AXIS: 26 DEGREES
EKG T AXIS: 27 DEGREES
EKG VENTRICULAR RATE: 79 BPM

## 2019-08-27 PROCEDURE — 93010 ELECTROCARDIOGRAM REPORT: CPT | Performed by: INTERNAL MEDICINE

## 2019-08-31 ENCOUNTER — HOSPITAL ENCOUNTER (OUTPATIENT)
Dept: WOMENS IMAGING | Age: 64
Discharge: HOME | End: 2019-08-31
Payer: COMMERCIAL

## 2019-08-31 DIAGNOSIS — Z12.31 ENCOUNTER FOR SCREENING MAMMOGRAM FOR BREAST CANCER: ICD-10-CM

## 2019-08-31 PROCEDURE — 77067 SCR MAMMO BI INCL CAD: CPT

## 2019-09-05 ENCOUNTER — ANESTHESIA EVENT (OUTPATIENT)
Dept: ENDOSCOPY | Age: 64
End: 2019-09-05
Payer: COMMERCIAL

## 2019-09-06 ENCOUNTER — HOSPITAL ENCOUNTER (OUTPATIENT)
Age: 64
Setting detail: OUTPATIENT SURGERY
Discharge: HOME | End: 2019-09-06
Attending: INTERNAL MEDICINE | Admitting: INTERNAL MEDICINE
Payer: COMMERCIAL

## 2019-09-06 ENCOUNTER — ANESTHESIA (OUTPATIENT)
Dept: ENDOSCOPY | Age: 64
End: 2019-09-06
Payer: COMMERCIAL

## 2019-09-06 VITALS
RESPIRATION RATE: 8 BRPM | OXYGEN SATURATION: 99 % | SYSTOLIC BLOOD PRESSURE: 112 MMHG | DIASTOLIC BLOOD PRESSURE: 73 MMHG

## 2019-09-06 VITALS
HEART RATE: 58 BPM | RESPIRATION RATE: 18 BRPM | OXYGEN SATURATION: 100 % | TEMPERATURE: 97.6 F | SYSTOLIC BLOOD PRESSURE: 114 MMHG | DIASTOLIC BLOOD PRESSURE: 65 MMHG | HEIGHT: 60 IN | WEIGHT: 240 LBS | BODY MASS INDEX: 47.12 KG/M2

## 2019-09-06 PROCEDURE — 2500000003 HC RX 250 WO HCPCS: Performed by: NURSE ANESTHETIST, CERTIFIED REGISTERED

## 2019-09-06 PROCEDURE — 6370000000 HC RX 637 (ALT 250 FOR IP): Performed by: INTERNAL MEDICINE

## 2019-09-06 PROCEDURE — 7100000011 HC PHASE II RECOVERY - ADDTL 15 MIN: Performed by: INTERNAL MEDICINE

## 2019-09-06 PROCEDURE — 2500000003 HC RX 250 WO HCPCS: Performed by: INTERNAL MEDICINE

## 2019-09-06 PROCEDURE — 3700000000 HC ANESTHESIA ATTENDED CARE: Performed by: INTERNAL MEDICINE

## 2019-09-06 PROCEDURE — 2580000003 HC RX 258: Performed by: ANESTHESIOLOGY

## 2019-09-06 PROCEDURE — 3700000001 HC ADD 15 MINUTES (ANESTHESIA): Performed by: INTERNAL MEDICINE

## 2019-09-06 PROCEDURE — 2709999900 HC NON-CHARGEABLE SUPPLY: Performed by: INTERNAL MEDICINE

## 2019-09-06 PROCEDURE — 6360000002 HC RX W HCPCS: Performed by: NURSE ANESTHETIST, CERTIFIED REGISTERED

## 2019-09-06 PROCEDURE — 7100000010 HC PHASE II RECOVERY - FIRST 15 MIN: Performed by: INTERNAL MEDICINE

## 2019-09-06 PROCEDURE — 3609009500 HC COLONOSCOPY DIAGNOSTIC OR SCREENING: Performed by: INTERNAL MEDICINE

## 2019-09-06 RX ORDER — LIDOCAINE HYDROCHLORIDE 20 MG/ML
INJECTION, SOLUTION INFILTRATION; PERINEURAL PRN
Status: DISCONTINUED | OUTPATIENT
Start: 2019-09-06 | End: 2019-09-06 | Stop reason: SDUPTHER

## 2019-09-06 RX ORDER — PROPOFOL 10 MG/ML
INJECTION, EMULSION INTRAVENOUS PRN
Status: DISCONTINUED | OUTPATIENT
Start: 2019-09-06 | End: 2019-09-06 | Stop reason: SDUPTHER

## 2019-09-06 RX ORDER — SODIUM CHLORIDE 9 MG/ML
INJECTION, SOLUTION INTRAVENOUS CONTINUOUS
Status: DISCONTINUED | OUTPATIENT
Start: 2019-09-06 | End: 2019-09-06 | Stop reason: HOSPADM

## 2019-09-06 RX ADMIN — PROPOFOL 50 MG: 10 INJECTION, EMULSION INTRAVENOUS at 07:50

## 2019-09-06 RX ADMIN — LIDOCAINE HYDROCHLORIDE 100 MG: 20 INJECTION, SOLUTION INFILTRATION; PERINEURAL at 07:50

## 2019-09-06 RX ADMIN — PROPOFOL 50 MG: 10 INJECTION, EMULSION INTRAVENOUS at 07:55

## 2019-09-06 RX ADMIN — PROPOFOL 50 MG: 10 INJECTION, EMULSION INTRAVENOUS at 07:52

## 2019-09-06 RX ADMIN — PROPOFOL 50 MG: 10 INJECTION, EMULSION INTRAVENOUS at 07:58

## 2019-09-06 RX ADMIN — SODIUM CHLORIDE: 9 INJECTION, SOLUTION INTRAVENOUS at 07:07

## 2019-09-06 ASSESSMENT — PAIN SCALES - GENERAL
PAINLEVEL_OUTOF10: 0

## 2019-09-06 ASSESSMENT — PAIN - FUNCTIONAL ASSESSMENT: PAIN_FUNCTIONAL_ASSESSMENT: 0-10

## 2019-09-06 ASSESSMENT — ENCOUNTER SYMPTOMS: SHORTNESS OF BREATH: 0

## 2019-09-06 NOTE — ANESTHESIA PRE PROCEDURE
Department of Anesthesiology  Preprocedure Note       Name:  Garland Livingston   Age:  59 y.o.  :  1955                                          MRN:  [id number]         Date:  2019      Surgeon: Kaela Pennington):  Mónica Lim MD    Procedure: COLONOSCOPY (N/A )    Medications prior to admission:   Prior to Admission medications    Not on File       Current medications:    Current Facility-Administered Medications   Medication Dose Route Frequency Provider Last Rate Last Dose    0.9 % sodium chloride infusion   Intravenous Continuous Diann Mitchell MD           Allergies:  No Known Allergies    Problem List:    Patient Active Problem List   Diagnosis Code    Postmenopausal bleeding N95.0       Past Medical History:        Diagnosis Date    Hypertension     not taking any meds       Past Surgical History:        Procedure Laterality Date    BUNIONECTOMY      DILATION AND CURETTAGE OF UTERUS  12    Dilatation and curetage, hysteroscopy, endometrial biopsy    DILATION AND CURETTAGE OF UTERUS  2017    HYSTEROSCOPY DILATATION AND CURETTAGE MYOSURE    HYSTEROSCOPY      HYSTEROSCOPY N/A 2018    HYSTEROSCOPY DILATATION AND CURETTAGE [id number] [address]       Social History:    Social History     Tobacco Use    Smoking status: Never Smoker    Smokeless tobacco: Never Used   Substance Use Topics    Alcohol use:  No                                Counseling given: Not Answered      Vital Signs (Current):   Vitals:    19 1159 19 0643   BP:  119/76   Pulse:  73   Resp:  16   Temp:  97.6 °F (36.4 °C)   TempSrc:  Temporal   SpO2:  100%   Weight: 240 lb (108.9 kg) 240 lb (108.9 kg)   Height: 5' (1.524 m) 5' (1.524 m)                                              BP Readings from Last 3 Encounters:   19 119/76   19 134/71   10/01/18 128/66       NPO Status: Time of last liquid consumption: 0130                        Time of last solid consumption: 1130                        Date

## 2020-07-21 ENCOUNTER — OFFICE VISIT (OUTPATIENT)
Dept: PRIMARY CARE CLINIC | Age: 65
End: 2020-07-21

## 2020-07-21 ENCOUNTER — HOSPITAL ENCOUNTER (OUTPATIENT)
Dept: WOMENS IMAGING | Age: 65
Discharge: HOME | End: 2020-07-21
Payer: COMMERCIAL

## 2020-07-21 PROCEDURE — 99211 OFF/OP EST MAY X REQ PHY/QHP: CPT | Performed by: NURSE PRACTITIONER

## 2020-07-21 NOTE — PATIENT INSTRUCTIONS
Steps to help prevent the spread of COVID-19 if you are sick  SOURCE - https://rehman-goyal.info/. html     Stay home except to get medical care   ; Stay home: People who are mildly ill with COVID-19 are able to isolate at home during their illness. You should restrict activities outside your home, except for getting medical care.   ; Avoid public areas: Do not go to work, school, or public areas.   ; Avoid public transportation: Avoid using public transportation, ride-sharing, or taxis.  ; Separate yourself from other people and animals in your home   ; Stay away from others: As much as possible, you should stay in a specific room and away from other people in your home. Also, you should use a separate bathroom, if available.   ; Limit contact with pets & animals: You should restrict contact with pets and other animals while you are sick with COVID-19, just like you would around other people. Although there have not been reports of pets or other animals becoming sick with COVID-19, it is still recommended that people sick with COVID-19 limit contact with animals until more information is known about the virus. ; When possible, have another member of your household care for your animals while you are sick. If you are sick with COVID-19, avoid contact with your pet, including petting, snuggling, being kissed or licked, and sharing food. If you must care for your pet or be around animals while you are sick, wash your hands before and after you interact with pets and wear a facemask. See COVID-19 and Animals for more information. Other considerations   The ill person should eat/be fed in their room if possible. Non-disposable  items used should be handled with gloves and washed with hot water or in a . Clean hands after handling used  items.  If possible, dedicate a lined trash can for the ill person.  Use gloves when removing garbage bags, handling, and disposing of trash. Wash hands after handling or disposing of trash.  Consider consulting with your local health department about trash disposal guidance if available. Information for Household Members and Caregivers of Someone who is Sick   Call ahead before visiting your doctor   Call ahead: If you have a medical appointment, call the healthcare provider and tell them that you have or may have COVID-19. This will help the healthcare provider's office take steps to keep other people from getting infected or exposed. Wear a facemask if you are sick   ; If you are sick: You should wear a facemask when you are around other people (e.g., sharing a room or vehicle) or pets and before you enter a healthcare provider's office. ; If you are caring for others: If the person who is sick is not able to wear a facemask (for example, because it causes trouble breathing), then people who live with the person who is sick should not stay in the same room with them, or they should wear a facemask if they enter a room with the person who is sick. Cover your coughs and sneezes   ; Cover: Cover your mouth and nose with a tissue when you cough or sneeze.   ; Dispose: Throw used tissues in a lined trash can.   ; Wash hands: Immediately wash your hands with soap and water for at least 20 seconds or, if soap and water are not available, clean your hands with an alcohol-based hand  that contains at least 60% alcohol. Clean your hands often   ;  Wash hands: Wash your hands often with soap and water for at least 20 seconds, especially after blowing your nose, coughing, or sneezing; going to the bathroom; and before eating or preparing food.   ; Hand : If soap and water are not readily available, use an alcohol-based hand  with at least 60% alcohol, covering all surfaces of your hands and rubbing them together until they feel dry.   ; Soap and water: Soap and water are the best option if hands are visibly dirty.   ; Avoid touching: Avoid touching your eyes, nose, and mouth with unwashed hands. Handwashing Tips   ; Wet your hands with clean, running water (warm or cold), turn off the tap, and apply soap.  ; Lather your hands by rubbing them together with the soap. Lather the backs of your hands, between your fingers, and under your nails. ; Scrub your hands for at least 20 seconds. Need a timer? Hum the [place] from beginning to end twice.  ; Rinse your hands well under clean, running water.  ; Dry your hands using a clean towel or air dry them. Avoid sharing personal household items   ; Do not share: You should not share dishes, drinking glasses, cups, eating utensils, towels, or bedding with other people or pets in your home.   ; Wash thoroughly after use: After using these items, they should be washed thoroughly with soap and water. Clean all high-touch surfaces everyday   ; Clean and disinfect: Practice routine cleaning of high touch surfaces.  ; High touch surfaces include counters, tabletops, doorknobs, bathroom fixtures, toilets, phones, keyboards, tablets, and bedside tables.  ; Disinfect areas with bodily fluids: Also, clean any surfaces that may have blood, stool, or body fluids on them.   ; Household : Use a household cleaning spray or wipe, according to the label instructions. Labels contain instructions for safe and effective use of the cleaning product including precautions you should take when applying the product, such as wearing gloves and making sure you have good ventilation during use of the product.     Monitor your symptoms   Seek medical attention: Seek prompt medical attention if your illness is worsening     (e.g., difficulty breathing).   ; Call your doctor: Before seeking care, call your healthcare provider and tell them that you have, or are being evaluated for, COVID-19.   ; Wear a facemask when sick: Put on a facemask before you enter the your e-mail address. You will receive e-mail notification when new information is available in [employer] 9. Click Sign Up. You can now view your medical record. Additional Information  If you have questions, please contact your physician practice where you receive care. Remember, MyChart is NOT to be used for urgent needs. For medical emergencies, dial 911.

## 2020-07-21 NOTE — PROGRESS NOTES
Trip Mcgee received a viral test for COVID-19. They were educated on isolation and quarantine as appropriate. For any symptoms, they were directed to seek care from their PCP, given contact information to establish with a doctor, directed to an urgent care or the emergency room.

## 2020-07-27 LAB
SARS-COV-2: NOT DETECTED
SOURCE: NORMAL

## 2020-08-06 ENCOUNTER — HOSPITAL ENCOUNTER (OUTPATIENT)
Age: 65
Setting detail: OBSERVATION
Discharge: HOME | End: 2020-08-07
Attending: STUDENT IN AN ORGANIZED HEALTH CARE EDUCATION/TRAINING PROGRAM | Admitting: INTERNAL MEDICINE
Payer: COMMERCIAL

## 2020-08-06 ENCOUNTER — APPOINTMENT (OUTPATIENT)
Dept: CT IMAGING | Age: 65
End: 2020-08-06
Payer: COMMERCIAL

## 2020-08-06 PROCEDURE — 99285 EMERGENCY DEPT VISIT HI MDM: CPT

## 2020-08-06 PROCEDURE — 93005 ELECTROCARDIOGRAM TRACING: CPT | Performed by: STUDENT IN AN ORGANIZED HEALTH CARE EDUCATION/TRAINING PROGRAM

## 2020-08-06 PROCEDURE — 70450 CT HEAD/BRAIN W/O DYE: CPT

## 2020-08-06 RX ORDER — IBUPROFEN 400 MG/1
400 TABLET ORAL EVERY 6 HOURS PRN
COMMUNITY
End: 2022-05-31

## 2020-08-06 RX ORDER — HYDROXYZINE HYDROCHLORIDE 25 MG/1
25 TABLET, FILM COATED ORAL 3 TIMES DAILY PRN
COMMUNITY
End: 2022-07-20

## 2020-08-06 RX ORDER — LOSARTAN POTASSIUM 50 MG/1
50 TABLET ORAL DAILY
Status: ON HOLD | COMMUNITY
End: 2021-06-22 | Stop reason: HOSPADM

## 2020-08-06 ASSESSMENT — PAIN SCALES - GENERAL: PAINLEVEL_OUTOF10: 8

## 2020-08-06 ASSESSMENT — PAIN DESCRIPTION - LOCATION: LOCATION: CHEST

## 2020-08-06 NOTE — LETTER
[facility]  [place] [address] [id number]  Phone: [phone]    No name on file. August 7, 2020     Patient: Roberto Pacheco   YOB: 1955   Date of Visit: 8/6/2020       To Whom It May Concern:     Caleen Cogan was admitted treated and evaluated the evening of 8/6/20- until her dc 8/7/20 at 5pm.     If you have any questions or concerns, please don't hesitate to call.     Sincerely,  Raisa Valencia RN

## 2020-08-07 ENCOUNTER — APPOINTMENT (OUTPATIENT)
Dept: GENERAL RADIOLOGY | Age: 65
End: 2020-08-07
Payer: COMMERCIAL

## 2020-08-07 VITALS
DIASTOLIC BLOOD PRESSURE: 94 MMHG | WEIGHT: 214.3 LBS | SYSTOLIC BLOOD PRESSURE: 141 MMHG | RESPIRATION RATE: 18 BRPM | TEMPERATURE: 98 F | HEART RATE: 58 BPM | OXYGEN SATURATION: 98 % | HEIGHT: 64 IN | BODY MASS INDEX: 36.58 KG/M2

## 2020-08-07 PROBLEM — R07.9 CHEST PAIN: Status: ACTIVE | Noted: 2020-08-07

## 2020-08-07 LAB
A/G RATIO: 1.3 (ref 1.1–2.2)
ALBUMIN SERPL-MCNC: 4.1 G/DL (ref 3.4–5)
ALP BLD-CCNC: 83 U/L (ref 40–129)
ALT SERPL-CCNC: 7 U/L (ref 10–40)
ANION GAP SERPL CALCULATED.3IONS-SCNC: 8 MMOL/L (ref 3–16)
AST SERPL-CCNC: 21 U/L (ref 15–37)
BASOPHILS ABSOLUTE: 0 K/UL (ref 0–0.2)
BASOPHILS RELATIVE PERCENT: 0.7 %
BILIRUB SERPL-MCNC: <0.2 MG/DL (ref 0–1)
BUN BLDV-MCNC: 14 MG/DL (ref 7–20)
CALCIUM SERPL-MCNC: 9.2 MG/DL (ref 8.3–10.6)
CHLORIDE BLD-SCNC: 102 MMOL/L (ref 99–110)
CHOLESTEROL, TOTAL: 158 MG/DL (ref 0–199)
CO2: 29 MMOL/L (ref 21–32)
CREAT SERPL-MCNC: 0.6 MG/DL (ref 0.6–1.2)
EKG ATRIAL RATE: 75 BPM
EKG DIAGNOSIS: NORMAL
EKG P AXIS: 57 DEGREES
EKG P-R INTERVAL: 176 MS
EKG Q-T INTERVAL: 390 MS
EKG QRS DURATION: 78 MS
EKG QTC CALCULATION (BAZETT): 435 MS
EKG R AXIS: 23 DEGREES
EKG T AXIS: 33 DEGREES
EKG VENTRICULAR RATE: 75 BPM
EOSINOPHILS ABSOLUTE: 0.1 K/UL (ref 0–0.6)
EOSINOPHILS RELATIVE PERCENT: 1.4 %
GFR AFRICAN AMERICAN: >60
GFR NON-AFRICAN AMERICAN: >60
GLOBULIN: 3.2 G/DL
GLUCOSE BLD-MCNC: 99 MG/DL (ref 70–99)
HCT VFR BLD CALC: 39.3 % (ref 36–48)
HDLC SERPL-MCNC: 47 MG/DL (ref 40–60)
HEMOGLOBIN: 12.7 G/DL (ref 12–16)
LDL CHOLESTEROL CALCULATED: 91 MG/DL
LV EF: 69 %
LVEF MODALITY: NORMAL
LYMPHOCYTES ABSOLUTE: 2.3 K/UL (ref 1–5.1)
LYMPHOCYTES RELATIVE PERCENT: 31.9 %
MCH RBC QN AUTO: 27 PG (ref 26–34)
MCHC RBC AUTO-ENTMCNC: 32.3 G/DL (ref 31–36)
MCV RBC AUTO: 83.4 FL (ref 80–100)
MONOCYTES ABSOLUTE: 0.6 K/UL (ref 0–1.3)
MONOCYTES RELATIVE PERCENT: 7.8 %
NEUTROPHILS ABSOLUTE: 4.2 K/UL (ref 1.7–7.7)
NEUTROPHILS RELATIVE PERCENT: 58.2 %
PDW BLD-RTO: 15.4 % (ref 12.4–15.4)
PLATELET # BLD: 244 K/UL (ref 135–450)
PMV BLD AUTO: 8.5 FL (ref 5–10.5)
POTASSIUM REFLEX MAGNESIUM: 4.6 MMOL/L (ref 3.5–5.1)
RBC # BLD: 4.71 M/UL (ref 4–5.2)
SODIUM BLD-SCNC: 139 MMOL/L (ref 136–145)
TOTAL PROTEIN: 7.3 G/DL (ref 6.4–8.2)
TRIGL SERPL-MCNC: 99 MG/DL (ref 0–150)
TROPONIN: <0.01 NG/ML
VLDLC SERPL CALC-MCNC: 20 MG/DL
WBC # BLD: 7.2 K/UL (ref 4–11)

## 2020-08-07 PROCEDURE — 85025 COMPLETE CBC W/AUTO DIFF WBC: CPT

## 2020-08-07 PROCEDURE — 80061 LIPID PANEL: CPT

## 2020-08-07 PROCEDURE — G0378 HOSPITAL OBSERVATION PER HR: HCPCS

## 2020-08-07 PROCEDURE — 3430000000 HC RX DIAGNOSTIC RADIOPHARMACEUTICAL: Performed by: INTERNAL MEDICINE

## 2020-08-07 PROCEDURE — 93010 ELECTROCARDIOGRAM REPORT: CPT | Performed by: INTERNAL MEDICINE

## 2020-08-07 PROCEDURE — 36415 COLL VENOUS BLD VENIPUNCTURE: CPT

## 2020-08-07 PROCEDURE — 80053 COMPREHEN METABOLIC PANEL: CPT

## 2020-08-07 PROCEDURE — A9502 TC99M TETROFOSMIN: HCPCS | Performed by: INTERNAL MEDICINE

## 2020-08-07 PROCEDURE — 2580000003 HC RX 258: Performed by: INTERNAL MEDICINE

## 2020-08-07 PROCEDURE — 71045 X-RAY EXAM CHEST 1 VIEW: CPT

## 2020-08-07 PROCEDURE — 78452 HT MUSCLE IMAGE SPECT MULT: CPT | Performed by: INTERNAL MEDICINE

## 2020-08-07 PROCEDURE — 94760 N-INVAS EAR/PLS OXIMETRY 1: CPT

## 2020-08-07 PROCEDURE — 93017 CV STRESS TEST TRACING ONLY: CPT | Performed by: INTERNAL MEDICINE

## 2020-08-07 PROCEDURE — 6370000000 HC RX 637 (ALT 250 FOR IP): Performed by: INTERNAL MEDICINE

## 2020-08-07 PROCEDURE — 84484 ASSAY OF TROPONIN QUANT: CPT

## 2020-08-07 RX ORDER — ATORVASTATIN CALCIUM 10 MG/1
20 TABLET, FILM COATED ORAL NIGHTLY
Status: DISCONTINUED | OUTPATIENT
Start: 2020-08-07 | End: 2020-08-07 | Stop reason: HOSPADM

## 2020-08-07 RX ORDER — HYDROXYZINE HYDROCHLORIDE 25 MG/1
25 TABLET, FILM COATED ORAL 3 TIMES DAILY PRN
Status: DISCONTINUED | OUTPATIENT
Start: 2020-08-07 | End: 2020-08-07 | Stop reason: HOSPADM

## 2020-08-07 RX ORDER — SODIUM CHLORIDE 0.9 % (FLUSH) 0.9 %
10 SYRINGE (ML) INJECTION PRN
Status: DISCONTINUED | OUTPATIENT
Start: 2020-08-07 | End: 2020-08-07 | Stop reason: HOSPADM

## 2020-08-07 RX ORDER — LOSARTAN POTASSIUM 100 MG/1
50 TABLET ORAL DAILY
Status: DISCONTINUED | OUTPATIENT
Start: 2020-08-07 | End: 2020-08-07 | Stop reason: HOSPADM

## 2020-08-07 RX ORDER — ASPIRIN 81 MG/1
81 TABLET, CHEWABLE ORAL DAILY
Status: DISCONTINUED | OUTPATIENT
Start: 2020-08-08 | End: 2020-08-07 | Stop reason: HOSPADM

## 2020-08-07 RX ORDER — ASPIRIN 300 MG/1
300 SUPPOSITORY RECTAL EVERY 6 HOURS PRN
Status: ON HOLD | COMMUNITY
End: 2020-08-07 | Stop reason: HOSPADM

## 2020-08-07 RX ORDER — ACETAMINOPHEN 650 MG/1
650 SUPPOSITORY RECTAL EVERY 6 HOURS PRN
Status: DISCONTINUED | OUTPATIENT
Start: 2020-08-07 | End: 2020-08-07 | Stop reason: HOSPADM

## 2020-08-07 RX ORDER — ONDANSETRON 2 MG/ML
4 INJECTION INTRAMUSCULAR; INTRAVENOUS EVERY 6 HOURS PRN
Status: DISCONTINUED | OUTPATIENT
Start: 2020-08-07 | End: 2020-08-07 | Stop reason: HOSPADM

## 2020-08-07 RX ORDER — SODIUM CHLORIDE 0.9 % (FLUSH) 0.9 %
10 SYRINGE (ML) INJECTION EVERY 12 HOURS SCHEDULED
Status: DISCONTINUED | OUTPATIENT
Start: 2020-08-07 | End: 2020-08-07 | Stop reason: HOSPADM

## 2020-08-07 RX ORDER — AMLODIPINE BESYLATE 5 MG/1
5 TABLET ORAL DAILY
Status: CANCELLED | OUTPATIENT
Start: 2020-08-07

## 2020-08-07 RX ORDER — NITROGLYCERIN 0.4 MG/1
0.4 TABLET SUBLINGUAL EVERY 5 MIN PRN
Status: DISCONTINUED | OUTPATIENT
Start: 2020-08-07 | End: 2020-08-07 | Stop reason: HOSPADM

## 2020-08-07 RX ORDER — HYDRALAZINE HYDROCHLORIDE 20 MG/ML
10 INJECTION INTRAMUSCULAR; INTRAVENOUS EVERY 6 HOURS PRN
Status: DISCONTINUED | OUTPATIENT
Start: 2020-08-07 | End: 2020-08-07 | Stop reason: HOSPADM

## 2020-08-07 RX ORDER — POLYETHYLENE GLYCOL 3350 17 G/17G
17 POWDER, FOR SOLUTION ORAL DAILY PRN
Status: DISCONTINUED | OUTPATIENT
Start: 2020-08-07 | End: 2020-08-07 | Stop reason: HOSPADM

## 2020-08-07 RX ORDER — ASPIRIN 81 MG/1
324 TABLET, CHEWABLE ORAL ONCE
Status: COMPLETED | OUTPATIENT
Start: 2020-08-07 | End: 2020-08-07

## 2020-08-07 RX ORDER — PROMETHAZINE HYDROCHLORIDE 25 MG/1
12.5 TABLET ORAL EVERY 6 HOURS PRN
Status: DISCONTINUED | OUTPATIENT
Start: 2020-08-07 | End: 2020-08-07 | Stop reason: HOSPADM

## 2020-08-07 RX ORDER — ACETAMINOPHEN 325 MG/1
650 TABLET ORAL EVERY 6 HOURS PRN
Status: DISCONTINUED | OUTPATIENT
Start: 2020-08-07 | End: 2020-08-07 | Stop reason: HOSPADM

## 2020-08-07 RX ADMIN — Medication 10 ML: at 09:00

## 2020-08-07 RX ADMIN — TETROFOSMIN 30 MILLICURIE: 1.38 INJECTION, POWDER, LYOPHILIZED, FOR SOLUTION INTRAVENOUS at 14:44

## 2020-08-07 RX ADMIN — LOSARTAN POTASSIUM 50 MG: 100 TABLET, FILM COATED ORAL at 14:09

## 2020-08-07 RX ADMIN — TETROFOSMIN 10 MILLICURIE: 1.38 INJECTION, POWDER, LYOPHILIZED, FOR SOLUTION INTRAVENOUS at 14:44

## 2020-08-07 RX ADMIN — ASPIRIN 324 MG: 81 TABLET, CHEWABLE ORAL at 03:20

## 2020-08-07 RX ADMIN — ATORVASTATIN CALCIUM 20 MG: 10 TABLET, FILM COATED ORAL at 03:20

## 2020-08-07 ASSESSMENT — PAIN SCALES - GENERAL
PAINLEVEL_OUTOF10: 0

## 2020-08-07 ASSESSMENT — HEART SCORE: ECG: 0

## 2020-08-07 NOTE — CARE COORDINATION
Discharge Planning Assessment    SW discharge planner met with patient to discuss reason for admission, current living situation, and potential needs at the time of discharge.  services used, #[id number]    Demographics/Insurance verified:  Yes    Current type of dwelling:  [facility] arrangements:  Alone    Level of function/Support:  Pt reported she is independent with ADLs. Pt reports still working full time. Has support from a niece and nephew locally. PCP:  Yes, see's doctor at [facility] in [place], [place]    Last Visit to PCP:  Few weeks ago    DME:  None. No needs reported. Active with any community resources/agencies/skilled home care:  None. Pt asked about Medicaid insurance. SW informed pt's monthly income is over [facility] limits. Discussed Medicaid and other state/federal benefits for permanent residents. Medication compliance issues:  No.  SW consult stated pt needed help w/meds. Pt stated whenever she needs meds, she calls the [facility]'s office or sets and appointment and they provide the meds she needs. Financial issues that could impact healthcare:  No.  Pt stated able to afford. Tentative discharge plan:  Home most likely. No needs identified at this time. Discussed with patient and/or family that on the day of discharge home tentative time of discharge will be between 10 AM and noon. Transportation at the time of discharge:  Pt drives. Niece or nephew can assist too.     Electronically signed by HEIDI Terry, JANETTW on 8/7/2020 at 3:11 PM

## 2020-08-07 NOTE — H&P
Hospital Medicine History and Physical    8/7/2020    Date of Admission: 8/7/2020    Date of Service: Pt seen/examined on 8/7/2020 and admitted to observation. Assessment/plan:  1. Chest pain. Initial EKG and troponin unremarkable for ischemia. Obtain serial troponin. Start aspirin, statin. Plan for stress test later this morning. 2. Essential hypertension, uncontrolled. Continue home dose of losartan 50 mg daily. Add PRN IV hydralazine for systolic blood pressure greater than 150 mmHg. Assess blood pressure, consider increasing losartan dose prior to discharge. 3. Other comorbidities: Morbid obesity with BMI of 37 kg/m². Activities: Up with assist  Prophylaxis: Subcutaneous Lovenox  Code status: Full code    ==========================================================  Chief complaint:  Chief Complaint   Patient presents with    Chest Pain     Pt c/o of chest pain in center of chest and left sided numbness with dizziness, states that it started about 1930, nih negative. History of Presenting Illness: This is a pleasant 62 y.o. female with history of essential hypertension, who is Lithuanian-speaking, who presents to the emergency room for evaluation of chest pain. Patient reports that she was driving last night when she experienced substernal chest discomfort with radiation to left arm and left jaw, associated dizziness. Symptoms have since resolved completely. In the emergency room, she had normal troponin and EKG. CT-head was obtained for evaluation of dizziness, unremarkable. Chest x-ray was unremarkable. Hospital medicine service consulted for admission for further evaluation of chest pain.     Past Medical History:      Diagnosis Date    Hypertension        Past Surgical History:      Procedure Laterality Date    BUNIONECTOMY      COLONOSCOPY N/A 9/6/2019    COLONOSCOPY performed by Ammy Longoria [facility] at [facility]  4/2/12 Dilatation and curetage, hysteroscopy, endometrial biopsy    DILATION AND CURETTAGE OF UTERUS  03/09/2017    HYSTEROSCOPY DILATATION AND CURETTAGE MYOSURE    HYSTEROSCOPY      HYSTEROSCOPY N/A 08/16/2018    HYSTEROSCOPY DILATATION AND CURETTAGE MYOSURE       Medications (prior to admission):  Prior to Admission medications    Medication Sig Start Date End Date Taking? Authorizing Provider   losartan (COZAAR) 50 MG tablet Take 50 mg by mouth daily   Yes Historical Provider, MD   hydrOXYzine (ATARAX) 25 MG tablet Take 25 mg by mouth 3 times daily as needed for Itching   Yes Historical Provider, MD   ibuprofen (ADVIL;MOTRIN) 400 MG tablet Take 400 mg by mouth every 6 hours as needed for Pain   Yes Historical Provider, MD       Allergy(ies):  Patient has no known allergies. Social History:  TOBACCO:  reports that she has never smoked. She has never used smokeless tobacco.  ETOH:  reports no history of alcohol use. Family History:      Problem Relation Age of Onset    Diabetes Sister        Review of Systems:  Pertinent positives are listed in HPI. At least 10-point ROS reviewed and were negative. Vitals and physical examination:  BP (!) 150/81   Pulse 58   Temp 98.3 °F (36.8 °C) (Oral)   Resp 18   Ht 5' 4\" (1.626 m)   Wt 220 lb (99.8 kg)   SpO2 98%   BMI 37.76 kg/m²   Gen/overall appearance: Not in acute distress. Alert. Head: Normocephalic, atraumatic  Eyes: EOMI, good acuity  ENT: Oral mucosa moist  Neck: No JVD, thyromegaly  CVS: Nml S1S2, no MRG, RRR  Pulm: Clear bilaterally. No crackles/wheezes  Gastrointestinal: Soft, NT/ND, +BS  Musculoskeletal: No edema. Warm  Neuro: No focal deficit. Moves extremity spontaneously. Psychiatry: Appropriate affect. Not agitated. Skin: Warm, dry with normal turgor.  No rash  Capillary refill: Brisk,< 3 seconds   Peripheral Pulses: +2 palpable, equal bilaterally       Labs/imaging/EKG:  CBC:   Recent Labs     08/07/20  0008   WBC 7.2   HGB 12.7    BMP:    Recent Labs     08/07/20 0008      K 4.6      CO2 29   BUN 14   CREATININE 0.6   GLUCOSE 99     Hepatic:   Recent Labs     08/07/20 0008   AST 21   ALT 7*   BILITOT <0.2   ALKPHOS 83       Ct Head Wo Contrast    Result Date: 8/6/2020  EXAMINATION: CT OF THE HEAD WITHOUT CONTRAST  8/6/2020 8:54 pm TECHNIQUE: CT of the head was performed without the administration of intravenous contrast. Dose modulation, iterative reconstruction, and/or weight based adjustment of the mA/kV was utilized to reduce the radiation dose to as low as reasonably achievable. COMPARISON: 08/26/2019 HISTORY: ORDERING SYSTEM PROVIDED HISTORY: tingling TECHNOLOGIST PROVIDED HISTORY: Reason for exam:->tingling Has a \"code stroke\" or \"stroke alert\" been called? ->No Reason for Exam: tingling Acuity: Acute Type of Exam: Initial FINDINGS: BRAIN/VENTRICLES: There is no acute intracranial hemorrhage, mass effect or midline shift. No abnormal extra-axial fluid collection. The gray-white differentiation is maintained without evidence of an acute infarct. There is no evidence of hydrocephalus. Subtle bilateral basal ganglia calcifications noted. ORBITS: The visualized portion of the orbits demonstrate no acute abnormality. SINUSES: The visualized paranasal sinuses and mastoid air cells demonstrate no acute abnormality. SOFT TISSUES/SKULL:  No acute abnormality of the visualized skull or soft tissues. No acute intracranial abnormality. EKG: Normal sinus rhythm with no acute ST changes. Nonspecific T changes in inferior lead. I reviewed EKG. Discussed with ER provider.       Thank you Referring Not In System (Inactive) for the opportunity to be involved in this patient's care.    -----------------------------  Zulema Chan MD  [facility] hospitalist

## 2020-08-07 NOTE — PROGRESS NOTES
Data- discharge order received, pt verbalized agreement to discharge, disposition to previous residence, no needs for HHC/DME. Action- discharge instructions prepared in Guyanese and given to patient discussed with niece at bedside, pt verbalized understanding. no new medications. Discharge instruction summary: Diet- general, Activity- indepenedent, Primary Care Physician as follows:Rec pt f/u  With PCP ,   Response- Pt belongings gathered, IV removed. Disposition is home (no HHC/DME needs), transported with belongings, taken to lobby via w/c w/ dc RN, no complications.

## 2020-08-07 NOTE — PROGRESS NOTES
in room during stress test.    Patient instructed on Rc Protocol Stress Test Procedure including possible side effects and adverse reactions. Verbalizes knowledge and understanding and denies having any questions.

## 2020-08-07 NOTE — DISCHARGE SUMMARY
DO   8/7/2020      Thank you Referring Not In System (Inactive) for the opportunity to be involved in this patient's care.  If you have any questions or concerns please feel free to contact me at [facility]

## 2020-08-07 NOTE — PLAN OF CARE
Pt educated per language line to report pain to RN. Pt denying pain upon admission to the floor. Pt shown call light and educated upon use. Call light is within reach. Will continue to monitor.

## 2020-08-07 NOTE — ED PROVIDER NOTES
Primary Care Physician: Referring Not In System (Inactive)   Attending Physician: No att. providers found     History   Chief Complaint   Patient presents with    Chest Pain     Pt c/o of chest pain in center of chest and left sided numbness with dizziness, states that it started about 1930, nih negative. HPI   Trip Mcgee is a 62 y.o. female with history of hypertension and currently takes losartan resenting this evening accompanied by daughter-in-law who translated for her. She states that evening she developed some chest pain which is associated with tingling on the left side of the jaw and left hand. While driving to the emergency room she developed some dizziness and shortness of breath but no diaphoresis. Patient denies any history of coronary artery disease in the past and no family history of sudden cardiac arrest before 49. Denies any fevers, cough, nausea, vomiting. He describes the pain as achy and is now resolved.     Past Medical History:   Diagnosis Date    Hypertension         Past Surgical History:   Procedure Laterality Date    BUNIONECTOMY      COLONOSCOPY N/A 9/6/2019    COLONOSCOPY performed by Dedrick Pierre MD at [facility]  4/2/12    Dilatation and curetage, hysteroscopy, endometrial biopsy    DILATION AND CURETTAGE OF UTERUS  03/09/2017    HYSTEROSCOPY DILATATION AND CURETTAGE MYOSURE    HYSTEROSCOPY      HYSTEROSCOPY N/A 08/16/2018    HYSTEROSCOPY DILATATION AND CURETTAGE [id number] [address]        Family History   Problem Relation Age of Onset    Diabetes Sister         Social History     Socioeconomic History    Marital status:      Spouse name: None    Number of children: None    Years of education: None    Highest education level: None   Occupational History    None   Social Needs    Financial resource strain: None    Food insecurity     Worry: None     Inability: None    Transportation needs     Medical: None Non-medical: None   Tobacco Use    Smoking status: Never Smoker    Smokeless tobacco: Never Used   Substance and Sexual Activity    Alcohol use: No    Drug use: No     Frequency: 3.0 times per week    Sexual activity: None   Lifestyle    Physical activity     Days per week: None     Minutes per session: None    Stress: None   Relationships    Social connections     Talks on phone: None     Gets together: None     Attends Yazidi service: None     Active member of club or organization: None     Attends meetings of clubs or organizations: None     Relationship status: None    Intimate partner violence     Fear of current or ex partner: None     Emotionally abused: None     Physically abused: None     Forced sexual activity: None   Other Topics Concern    None   Social History Narrative    None        Review of Systems   10 total systems reviewed and found to be negative unless otherwise noted in HPI     Physical Exam   BP (!) 141/94   Pulse 58   Temp 98 °F (36.7 °C) (Oral)   Resp 18   Ht 5' 4\" (1.626 m)   Wt 214 lb 4.8 oz (97.2 kg)   SpO2 98%   BMI 36.78 kg/m²      CONSTITUTIONAL: Well appearing, in no acute distress   HEAD: atraumatic, normocephalic   EYES: PERRL, No injection, discharge or scleral icterus. ENT: Moist mucous membranes. NECK: Normal ROM, NO LAD   CARDIOVASCULAR: Regular rate and rhythm. No murmurs or gallop. PULMONARY/CHEST: Airway patent. No retractions. Breath sounds clear with good air entry bilaterally. ABDOMEN: Soft, Non-distended and non-tender, without guarding or rebound. SKIN: Acyanotic, warm, dry   MUSCULOSKELETAL: No swelling, tenderness or deformity   NEUROLOGICAL: Awake and oriented x 3. Pulses intact. Grossly nonfocal   Nursing note and vitals reviewed.        ED Course & Medical Decision Making   Medications   aspirin chewable tablet 324 mg (324 mg Oral Given 8/7/20 0320)   technetium tetrofosmin (Tc-MYOVIEW) injection 30 millicurie (30 millicuries Intravenous Given 8/7/20 1444)   technetium tetrofosmin (Tc-MYOVIEW) injection 10 millicurie (10 millicuries Intravenous Given 8/7/20 1444)      Labs Reviewed   COMPREHENSIVE METABOLIC PANEL W/ REFLEX TO MG FOR LOW K - Abnormal; Notable for the following components:       Result Value    ALT 7 (*)     All other components within normal limits    Narrative:     Performed at:  [facility]  [address],  [place], [address]   Phone [phone]   CBC WITH AUTO DIFFERENTIAL    Narrative:     Performed at:  [facility]  [address],  [place], [address]   Phone [phone]   TROPONIN    Narrative:     Performed at:  [facility]  [address],  [place], [address]   Phone [phone]   TROPONIN    Narrative:     Performed at:  [facility]  [address],  [place], [address]   Phone [phone]   TROPONIN    Narrative:     Performed at:  [facility]  [address],  [place], [address]   Phone [phone]   LIPID PANEL    Narrative:     Performed at:  [facility]  [address] [place], [place] [address]   Phone [phone]      XR CHEST PORTABLE   Final Result   Grossly negative portable chest.         NM Cardiac Stress Test Nuclear Imaging   Final Result      CT Head WO Contrast   Final Result   No acute intracranial abnormality. Ct Head Wo Contrast Result Date: 8/6/2020  EXAMINATION: CT OF THE HEAD WITHOUT CONTRAST  8/6/2020 8:54 pm TECHNIQUE: CT of the head was performed without the administration of intravenous contrast. Dose modulation, iterative reconstruction, and/or weight based adjustment of the mA/kV was utilized to reduce the radiation dose to as low as reasonably achievable.  COMPARISON: 08/26/2019 HISTORY: ORDERING SYSTEM PROVIDED HISTORY: sue TECHNOLOGIST PROVIDED HISTORY: Reason for exam:->tingling Has a \"code stroke\" or \"stroke alert\" been called? ->No Reason for Exam: tingling Acuity: Acute Type of Exam: Initial FINDINGS: BRAIN/VENTRICLES: There is no acute intracranial hemorrhage, mass effect or midline shift. No abnormal extra-axial fluid collection. The gray-white differentiation is maintained without evidence of an acute infarct. There is no evidence of hydrocephalus. Subtle bilateral basal ganglia calcifications noted. ORBITS: The visualized portion of the orbits demonstrate no acute abnormality. SINUSES: The visualized paranasal sinuses and mastoid air cells demonstrate no acute abnormality. SOFT TISSUES/SKULL:  No acute abnormality of the visualized skull or soft tissues. No acute intracranial abnormality. EKG INTERPRETATION:  EKG by my preliminary interpretation shows sinus rhythm with rate of 75, normal axis, normal intervals, with no ST changes indicative of ischemia at this time. History: 2  EC  Patient Age: 2  *Risk factors for Atherosclerotic disease: Hypertension  Risk Factors: 1  Troponin: 0  Heart Score Total: 4      ASSESSMENT AND PLAN:  Joaquin Gifford is a 62 y.o. female presenting this evening accompanied by daughter in law planing of some chest pain that was associated with dizziness with numbness and tingling left face as well as left hand. Resolved by the time patient arrived emergency room. On exam she is back to baseline, neurologically intact and in no acute distress and nontoxic. CT of the head showed no abnormal findings. EKG with no ST changes. Labs including troponin unremarkable. Chest x-ray is pending. The less, I think the patient needs to be admitted for further evaluation. Her heart score is a 4 requiring further evaluation for ACS and her symptoms are also concerning for TIA. At this time, I am requesting admission for further evaluation including possible MRI, stress test as well as an echo.   Hospitalist consulted and will be admitting patient for further evaluation. DISPOSITION    Hospitalist admit   -Findings and recommendations explained to patient, and daughter-in-law. They expressed understanding and agreed with the plan. Verdell Goltz, MD (electronically signed)  8/8/2020  _________________________________________________________________________________________  Amount and/or Complexity of Data Reviewed:  Clinical lab tests: ordered and reviewed   Tests in the radiology section of CPT®: ordered and reviewed   Tests in the medicine section of CPT®: ordered and reviewed   Decide to obtain previous medical records or to obtain history from someone other than the patient: yes  Obtain history from someone other than the patient: yes  Review and summarize past medical records:yes  I looked up the patient in our electronic medical record:yes  Discuss the patient with other providers:yes  Independent visualization of images, tracings, or specimens:yes  Risk of Complications, Morbidity, and/or Mortality:Moderate  Presenting problems: moderate  Management options: moderate     _________________________________________________________________________________________  This record is transcribed utilizing voice recognition technology. There are inherent limitations in this technology. In addition, there may be limitations in editing of this report. If there are any discrepancies, please contact me directly. ClINICAL IMPRESSION:  1.  Atypical chest pain             Brian Cook MD  08/08/20 8462

## 2020-08-07 NOTE — PROGRESS NOTES
Pt's admission completed with the assistance of interpretor [id number] on the language line. Pt denying pain upon admission to the floor. Pt educated through language line to report any chest pain or sensation she had which prompted her to seek medical care. Pt updated on POC and notified not to eat or drink, that Pt had been ordered a stress test for this morning. Pt informed she may have medications with sips of water given per RN. Pt informed an interpretor would be present for the stress test. Pt educated on the ordered medications and administered see MAR. Pt shown call light and how to use. Call light has been left within reach.

## 2020-08-07 NOTE — ED NOTES
Pt resting in bed, no s/s of distress. Alert and oriented. Pt family at bedside, pt is nsr on the monitor. Respirations easy, even, and unlabored.       Janae Polk RN  08/07/20 0093

## 2020-11-03 ENCOUNTER — OFFICE VISIT (OUTPATIENT)
Dept: ORTHOPEDIC SURGERY | Age: 65
End: 2020-11-03
Payer: COMMERCIAL

## 2020-11-03 VITALS — BODY MASS INDEX: 36.54 KG/M2 | TEMPERATURE: 97.7 F | HEIGHT: 64 IN | WEIGHT: 214 LBS

## 2020-11-03 PROCEDURE — G8427 DOCREV CUR MEDS BY ELIG CLIN: HCPCS | Performed by: ORTHOPAEDIC SURGERY

## 2020-11-03 PROCEDURE — 3017F COLORECTAL CA SCREEN DOC REV: CPT | Performed by: ORTHOPAEDIC SURGERY

## 2020-11-03 PROCEDURE — 1036F TOBACCO NON-USER: CPT | Performed by: ORTHOPAEDIC SURGERY

## 2020-11-03 PROCEDURE — 99203 OFFICE O/P NEW LOW 30 MIN: CPT | Performed by: ORTHOPAEDIC SURGERY

## 2020-11-03 PROCEDURE — G8417 CALC BMI ABV UP PARAM F/U: HCPCS | Performed by: ORTHOPAEDIC SURGERY

## 2020-11-03 PROCEDURE — G8484 FLU IMMUNIZE NO ADMIN: HCPCS | Performed by: ORTHOPAEDIC SURGERY

## 2020-11-03 RX ORDER — ASPIRIN 81 MG/1
81 TABLET, CHEWABLE ORAL DAILY
COMMUNITY

## 2020-11-03 NOTE — PROGRESS NOTES
CHIEF COMPLAINT:   1-Right forefoot pain/ 3rd hammertoe deformity. 2-Right foot plantar pain/ plantar wart  3-Left foot 5th toe pain/ callus    HISTORY:  Ms. Yvon Welsh 60 y.o.  female presents today for the first visit for evaluation of right forefoot pain which started to worsen over time in the 2nd and 3rd toes and states there is also a tender lump on the plantar surface of the right foot. She is with an .  She is complaining of achy tender pain. Rates pain a 10/10 VAS and is very difficult to walk during the day. Pain is increase with shoe wear. She is also complaining of left foot fifth the lateral toe pain with callus. Pain is worse with shoewear and better with rest and elevation. She has not had any treatment for this problem. No numbness and tingling sensation. No other complaint. She works a standing job.     Past Medical History:   Diagnosis Date    Hypertension        Past Surgical History:   Procedure Laterality Date    BUNIONECTOMY      COLONOSCOPY N/A 9/6/2019    COLONOSCOPY performed by Saray Ibnaez [facility] at [facility]  4/2/12    Dilatation and curetage, hysteroscopy, endometrial biopsy    DILATION AND CURETTAGE OF UTERUS  03/09/2017    HYSTEROSCOPY DILATATION AND CURETTAGE MYOSURE    HYSTEROSCOPY      HYSTEROSCOPY N/A 08/16/2018    HYSTEROSCOPY DILATATION AND CURETTAGE MYOSURE       Social History     Socioeconomic History    Marital status:      Spouse name: Not on file    Number of children: Not on file    Years of education: Not on file    Highest education level: Not on file   Occupational History    Not on file   Social Needs    Financial resource strain: Not on file    Food insecurity     Worry: Not on file     Inability: Not on file    Transportation needs     Medical: Not on file     Non-medical: Not on file   Tobacco Use    Smoking status: Never Smoker    Smokeless tobacco: Never Used   Substance and Sexual Activity    Alcohol use: No    Drug use: No     Frequency: 3.0 times per week    Sexual activity: Not on file   Lifestyle    Physical activity     Days per week: Not on file     Minutes per session: Not on file    Stress: Not on file   Relationships    Social connections     Talks on phone: Not on file     Gets together: Not on file     Attends Muslim service: Not on file     Active member of club or organization: Not on file     Attends meetings of clubs or organizations: Not on file     Relationship status: Not on file    Intimate partner violence     Fear of current or ex partner: Not on file     Emotionally abused: Not on file     Physically abused: Not on file     Forced sexual activity: Not on file   Other Topics Concern    Not on file   Social History Narrative    Not on file       Family History   Problem Relation Age of Onset    Diabetes Sister        Current Outpatient Medications on File Prior to Visit   Medication Sig Dispense Refill    aspirin 81 MG chewable tablet 81 mg daily      losartan (COZAAR) 50 MG tablet Take 50 mg by mouth daily      hydrOXYzine (ATARAX) 25 MG tablet Take 25 mg by mouth 3 times daily as needed for Itching      ibuprofen (ADVIL;MOTRIN) 400 MG tablet Take 400 mg by mouth every 6 hours as needed for Pain       No current facility-administered medications on file prior to visit. Pertinent items are noted in HPI  Review of systems reviewed from Patient History Form dated on 11/3/2020 and available in the patient's chart under the Media tab. No change noted. PHYSICAL EXAMINATION:  Ms. Yany Sharp is a very pleasant 60 y.o.  female who presents today in no acute distress, awake, alert, and oriented. She is well dressed, nourished and  groomed. Patient with normal affect. Height is  5' 4\" (1.626 m), weight is 214 lb (97.1 kg), Body mass index is 36.73 kg/m². Resting respiratory rate is 16.      Examination of the gait, showed that the patient walks heel-toe with a non-antalgic gait and no limp.  Examination of both ankles showing a good range of motion.  She has dorsiflexion to about 10 degrees bilaterally, which increased with knee flexion. She has intact sensation and good pedal pulses.  She has good strength in all four planes, including eversion, and has moderate tenderness on deep palpation over the right 2nd, 3rd toe PIP joint with fixed hammertoe deformity compared to the other side. There is a callus on the right second and third hammertoes. The ankles are stable to drawer test bilaterally, ankle reflex 1+ equally bilaterally.   There is a tender round area at the plantar surface mid right foot that is tender to palpation, consistent with a plantars wart. The left foot fifth lateral toe with a callus and a tender bony prominence near the tuft. IMAGING: Xray's were reviewed.  3 views of the bilateral foot taken in office today, and showed no acute fracture. There is 2nd, 3rd toe hammertoe deformity. IMPRESSION:   1-Right forefoot pain/ 3rd hammertoe deformity. 2-Right foot plantar pain/ plantar wart  3-Left foot 5th toe pain/ callus    PLAN: I discussed with the patient the treatment options. We recommended stretching exercises of the calf which was taught to the patient today. She will take NSAID as needed. Use wide toe box shoes. She was instructed to use a toe spacer for the right hammertoes. We discussed with her surgical options for hammertoe correction and she would like to discuss doing surgery sometime in January. For the plantars wart, we will refer her to dermatology. For the left fifth toe callus we discussed with her that we recommend callus removal, but she refused. She can use a pumice stone when calluses softened and attempt to remove on her own. Wear wide shoes. Follow-up as needed.       Zach Freire MD

## 2020-11-04 PROBLEM — M20.41 HAMMER TOE OF SECOND TOE OF RIGHT FOOT: Status: ACTIVE | Noted: 2020-11-04

## 2020-11-04 PROBLEM — L84 CALLUS OF FOOT: Status: ACTIVE | Noted: 2020-11-04

## 2020-11-04 PROBLEM — B07.0 PLANTAR WART OF RIGHT FOOT: Status: ACTIVE | Noted: 2020-11-04

## 2020-11-04 PROBLEM — M20.41 ACQUIRED HAMMER TOE DEFORMITY OF LESSER TOE OF RIGHT FOOT: Status: ACTIVE | Noted: 2020-11-04

## 2020-12-16 ENCOUNTER — HOSPITAL ENCOUNTER (OUTPATIENT)
Dept: WOMENS IMAGING | Age: 65
Discharge: HOME | End: 2020-12-16
Payer: COMMERCIAL

## 2020-12-16 PROCEDURE — 77063 BREAST TOMOSYNTHESIS BI: CPT

## 2021-01-22 ENCOUNTER — HOSPITAL ENCOUNTER (EMERGENCY)
Age: 66
Discharge: HOME | End: 2021-01-22
Payer: COMMERCIAL

## 2021-01-22 VITALS
TEMPERATURE: 97.3 F | WEIGHT: 214 LBS | HEIGHT: 60 IN | HEART RATE: 81 BPM | SYSTOLIC BLOOD PRESSURE: 138 MMHG | OXYGEN SATURATION: 95 % | DIASTOLIC BLOOD PRESSURE: 81 MMHG | RESPIRATION RATE: 18 BRPM | BODY MASS INDEX: 42.01 KG/M2

## 2021-01-22 DIAGNOSIS — L85.3 DRY SKIN DERMATITIS: ICD-10-CM

## 2021-01-22 DIAGNOSIS — L29.9 PRURITIC DISORDER: Primary | ICD-10-CM

## 2021-01-22 PROCEDURE — 99283 EMERGENCY DEPT VISIT LOW MDM: CPT

## 2021-01-22 RX ORDER — DIPHENHYDRAMINE HCL 25 MG
25 CAPSULE ORAL EVERY 6 HOURS PRN
Qty: 25 CAPSULE | Refills: 0 | Status: SHIPPED | OUTPATIENT
Start: 2021-01-22 | End: 2021-02-01

## 2021-01-22 RX ORDER — WATER / MINERAL OIL / WHITE PETROLATUM 16 OZ
CREAM TOPICAL
Qty: 1 PACKAGE | Refills: 0 | Status: SHIPPED | OUTPATIENT
Start: 2021-01-22 | End: 2022-07-20

## 2021-01-22 ASSESSMENT — ENCOUNTER SYMPTOMS
ABDOMINAL PAIN: 0
COLOR CHANGE: 1
RESPIRATORY NEGATIVE: 1
CONSTIPATION: 0
BACK PAIN: 0
DIARRHEA: 0
SHORTNESS OF BREATH: 0
NAUSEA: 0
COUGH: 0
VOMITING: 0

## 2021-01-23 NOTE — ED NOTES
Patient discharged  to home in stable condition via private car. Discharge instructions and prescriptions reviewed with patient. Patient verbalized understanding. All belongings in tow including discharge paperwork.           Temo Lamb RN  01/22/21 9013

## 2021-01-23 NOTE — ED PROVIDER NOTES
diaphoresis, fatigue and fever. Respiratory: Negative. Negative for cough and shortness of breath. Cardiovascular: Negative. Negative for chest pain. Gastrointestinal: Negative for abdominal pain, constipation, diarrhea, nausea and vomiting. Genitourinary: Negative for difficulty urinating and dysuria. Musculoskeletal: Negative for arthralgias, back pain, gait problem, joint swelling, myalgias, neck pain and neck stiffness. Skin: Positive for color change and rash. Negative for pallor and wound. Neurological: Negative for dizziness, light-headedness and headaches. Positives and Pertinent negatives as per HPI. Except as noted above in the ROS, all other systems were reviewed and negative. PAST MEDICAL HISTORY     Past Medical History:   Diagnosis Date    Hypertension          SURGICAL HISTORY     Past Surgical History:   Procedure Laterality Date    BUNIONECTOMY      COLONOSCOPY N/A 9/6/2019    COLONOSCOPY performed by Joe Simms MD at [facility]  4/2/12    Dilatation and curetage, hysteroscopy, endometrial biopsy    DILATION AND CURETTAGE OF UTERUS  03/09/2017    HYSTEROSCOPY DILATATION AND CURETTAGE MYOSURE    HYSTEROSCOPY      HYSTEROSCOPY N/A 08/16/2018    HYSTEROSCOPY DILATATION AND CURETTAGE [address]       Discharge Medication List as of 1/22/2021  9:51 PM      CONTINUE these medications which have NOT CHANGED    Details   aspirin 81 MG chewable tablet 81 mg dailyHistorical Med      losartan (COZAAR) 50 MG tablet Take 50 mg by mouth dailyHistorical Med      hydrOXYzine (ATARAX) 25 MG tablet Take 25 mg by mouth 3 times daily as needed for ItchingHistorical Med      ibuprofen (ADVIL;MOTRIN) 400 MG tablet Take 400 mg by mouth every 6 hours as needed for PainHistorical Med               ALLERGIES     Patient has no known allergies.     FAMILYHISTORY       Family History   Problem Relation Age of Onset    Diabetes Sister           SOCIAL HISTORY       Social History     Tobacco Use    Smoking status: Never Smoker    Smokeless tobacco: Never Used   Substance Use Topics    Alcohol use: No    Drug use: No     Frequency: 3.0 times per week       SCREENINGS             PHYSICAL EXAM    (up to 7 for level 4, 8 or more for level 5)     ED Triage Vitals [01/22/21 2040]   BP Temp Temp src Pulse Resp SpO2 Height Weight   138/81 97.3 °F (36.3 °C) -- 81 18 95 % 5' (1.524 m) 214 lb (97.1 kg)       Physical Exam  Constitutional:       General: She is not in acute distress. Appearance: Normal appearance. She is well-developed. She is not ill-appearing, toxic-appearing or diaphoretic. HENT:      Head: Normocephalic and atraumatic. Right Ear: External ear normal.      Left Ear: External ear normal.      Mouth/Throat:      Mouth: Mucous membranes are moist.      Pharynx: No oropharyngeal exudate or posterior oropharyngeal erythema. Eyes:      General:         Right eye: No discharge. Left eye: No discharge. Conjunctiva/sclera: Conjunctivae normal.   Neck:      Musculoskeletal: Normal range of motion and neck supple. Pulmonary:      Effort: Pulmonary effort is normal. No respiratory distress. Breath sounds: No stridor. Musculoskeletal: Normal range of motion. Skin:     General: Skin is warm and dry. Coloration: Skin is not pale. Findings: Rash present. No erythema. Comments: To the bilateral hands there appears to be some excoriations with significantly dry skin. Cracking noted. There is no edema, erythema or warmth. No induration or fluctuance. No bleeding or drainage. Full range of motion strength. Distal neurovascular intact. No other rash noted to exposed skin surfaces. Neurological:      Mental Status: She is alert and oriented to person, place, and time.    Psychiatric:         Behavior: Behavior normal.         DIAGNOSTIC RESULTS   LABS:    Labs Reviewed - No data to display    All other labs were within normal range or not returned as of this dictation. EKG: All EKG's are interpreted by the Emergency Department Physician in the absence of a cardiologist.  Please see their note for interpretation of EKG. RADIOLOGY:   Non-plain film images such as CT, Ultrasound and MRI are read by the radiologist. Plain radiographic images are visualized and preliminarily interpreted by the ED Provider with the below findings:        Interpretation per the Radiologist below, if available at the time of this note:    No orders to display     No results found. PROCEDURES   Unless otherwise noted below, none     Procedures    CRITICAL CARE TIME   N/A    CONSULTS:  None      EMERGENCY DEPARTMENT COURSE and DIFFERENTIAL DIAGNOSIS/MDM:   Vitals:    Vitals:    01/22/21 2040   BP: 138/81   Pulse: 81   Resp: 18   Temp: 97.3 °F (36.3 °C)   SpO2: 95%   Weight: 214 lb (97.1 kg)   Height: 5' (1.524 m)       Patient was given the following medications:  Medications - No data to display        Patient is a 76-year-old female who presents to the ED with complaint of dry and itching hands. Applied a cream today that burned. Came to the ED for further evaluation treatment. Upon examination patient complained of itching to the bilateral hands. Hands and wrists appears to be significantly dry. Believe patient suffering from some dry skin dermatitis versus dyshidrotic eczema. Patient will be given Eucerin for home. Give Benadryl to help with itching. Not believe empiric antibiotics indicated this time. Low suspicion for SJS, HSP, TEN, scabies, bedbugs, varicella, milia, tinea, erysipelas, scalded skin syndrome, meningococcemia, occult bacteremia, necrotizing fasciitis, folliculitis, cellulitis, abscess, angioedema, anaphylaxis or other emergent etiology at this time. Follow-up with PCP. Return the ED for any worsening symptoms. FINAL IMPRESSION      1. Pruritic disorder    2.  Dry skin

## 2021-02-23 ENCOUNTER — OFFICE VISIT (OUTPATIENT)
Dept: PRIMARY CARE CLINIC | Age: 66
End: 2021-02-23
Payer: COMMERCIAL

## 2021-02-23 DIAGNOSIS — Z20.828 EXPOSURE TO SARS-ASSOCIATED CORONAVIRUS: Primary | ICD-10-CM

## 2021-02-23 PROCEDURE — 99211 OFF/OP EST MAY X REQ PHY/QHP: CPT | Performed by: NURSE PRACTITIONER

## 2021-02-23 PROCEDURE — G8417 CALC BMI ABV UP PARAM F/U: HCPCS | Performed by: NURSE PRACTITIONER

## 2021-02-23 PROCEDURE — G8428 CUR MEDS NOT DOCUMENT: HCPCS | Performed by: NURSE PRACTITIONER

## 2021-02-23 NOTE — PATIENT INSTRUCTIONS
You have received a viral test for COVID-19. Below is education on quarantine per the CDC guidelines. For any symptoms, seek care from your PCP, call [phone] to establish care with a doctor, or go directly to an urgent care or the emergency room. Test results will take 2-7 days and will be sent to you in your [employer] account. If you test positive, you will be contacted via phone. If you test negative, the ONLY communication will be through [address] GO TO ticketea AND SIGN UP FOR [employer]  (LOWER LEFT OF THE HOME PAGE)  No test is 100%. If you have symptoms, you should follow the guidance of quarantine as previously stated. You can still be contagious if you have symptoms. Your [facility] will reach out to you if you have a positive result. They will provide you with a return to work date and note. If you were tested for a pre-op, then you should remain in quarantine until your procedure. How do I know if I need to be in quarantine? If you live in a community where COVID-19 is or might be spreading (currently, that is virtually everywhere in the [place])  Be alert for symptoms. Watch for fever, cough, shortness of breath, or other symptoms of COVID-19.  ? Take your temperature if symptoms develop. ? Practice social distancing. Maintain 6 feet of distance from others and stay out of crowded places. ? Follow CDC guidance if symptoms develop. If you feel healthy but:  ? Recently had close contact with a person with COVID-19 you need to Quarantine:  ? Stay home until 14 days after your last exposure. ? Check your temperature twice a day and watch for symptoms of COVID-19.  ? If possible, stay away from people who are at higher-risk for getting very sick from COVID-19. Stay Home and Monitor Your Health if you:  ? Have been diagnosed with COVID-19, or  ? Are waiting for test results, or  ?  Have cough, fever, or shortness of breath, or symptoms of COVID-19 When You Can be Around Others After You Had or Likely Had COVID-19     If you have or think you might have COVID-19, it is important to stay home and away from other people. Staying away from others helps stop the spread of COVID-19. If you have an emergency warning sign (including trouble breathing), get emergency medical care immediately. When you can be around others (end home isolation) depends on different factors for different situations. Find CDC's recommendations for your situation below. I think or know I had COVID-19, and I had symptoms  You can be with others after  ? 3 days with no fever and  ? Respiratory symptoms have improved (e.g. cough, shortness of breath) and  ? 10 days since symptoms first appeared  Depending on your healthcare provider's advice and availability of testing, you might get tested to see if you still have COVID-19. If you will be tested, you can be around others when you have no fever, respiratory symptoms have improved, and you receive two negative test results in a row, at least 24 hours apart. I tested positive for COVID-19 but had no symptoms  If you continue to have no symptoms, you can be with others after:  ? 10 days have passed since test or 14 days since your exposure test   Depending on your healthcare provider's advice and availability of testing, you might get tested to see if you still have COVID-19. If you will be tested, you can be around others after you receive two negative test results in a row, at least 24 hours apart. If you develop symptoms after testing positive, follow the guidance above for I think or know I had COVID, and I had symptoms.   For Anyone Who Has Been Around a Person with COVID-19  It is important to remember that anyone who has close contact with someone with COVID-19 should stay home for 14 days after exposure based on the time it takes to develop illness. Testing is not necessary.     www.cdc.gov/coronavirus/2019-ncov/index.html

## 2021-02-24 LAB — SARS-COV-2: NOT DETECTED

## 2021-03-01 ENCOUNTER — ANESTHESIA (OUTPATIENT)
Dept: ENDOSCOPY | Age: 66
End: 2021-03-01
Payer: COMMERCIAL

## 2021-03-01 ENCOUNTER — ANESTHESIA EVENT (OUTPATIENT)
Dept: ENDOSCOPY | Age: 66
End: 2021-03-01
Payer: COMMERCIAL

## 2021-03-01 ENCOUNTER — HOSPITAL ENCOUNTER (OUTPATIENT)
Age: 66
Setting detail: OUTPATIENT SURGERY
Discharge: HOME | End: 2021-03-01
Attending: INTERNAL MEDICINE | Admitting: INTERNAL MEDICINE
Payer: COMMERCIAL

## 2021-03-01 VITALS
RESPIRATION RATE: 16 BRPM | SYSTOLIC BLOOD PRESSURE: 114 MMHG | OXYGEN SATURATION: 98 % | DIASTOLIC BLOOD PRESSURE: 67 MMHG | TEMPERATURE: 96.9 F | HEART RATE: 60 BPM

## 2021-03-01 VITALS
OXYGEN SATURATION: 94 % | DIASTOLIC BLOOD PRESSURE: 64 MMHG | RESPIRATION RATE: 4 BRPM | SYSTOLIC BLOOD PRESSURE: 132 MMHG

## 2021-03-01 PROCEDURE — 2500000003 HC RX 250 WO HCPCS: Performed by: NURSE ANESTHETIST, CERTIFIED REGISTERED

## 2021-03-01 PROCEDURE — 3609012400 HC EGD TRANSORAL BIOPSY SINGLE/MULTIPLE: Performed by: INTERNAL MEDICINE

## 2021-03-01 PROCEDURE — 7100000010 HC PHASE II RECOVERY - FIRST 15 MIN: Performed by: INTERNAL MEDICINE

## 2021-03-01 PROCEDURE — 3700000000 HC ANESTHESIA ATTENDED CARE: Performed by: INTERNAL MEDICINE

## 2021-03-01 PROCEDURE — 2709999900 HC NON-CHARGEABLE SUPPLY: Performed by: INTERNAL MEDICINE

## 2021-03-01 PROCEDURE — 6360000002 HC RX W HCPCS: Performed by: NURSE ANESTHETIST, CERTIFIED REGISTERED

## 2021-03-01 PROCEDURE — 7100000011 HC PHASE II RECOVERY - ADDTL 15 MIN: Performed by: INTERNAL MEDICINE

## 2021-03-01 PROCEDURE — 2580000003 HC RX 258: Performed by: ANESTHESIOLOGY

## 2021-03-01 RX ORDER — PROPOFOL 10 MG/ML
INJECTION, EMULSION INTRAVENOUS PRN
Status: DISCONTINUED | OUTPATIENT
Start: 2021-03-01 | End: 2021-03-01 | Stop reason: SDUPTHER

## 2021-03-01 RX ORDER — SODIUM CHLORIDE, SODIUM LACTATE, POTASSIUM CHLORIDE, CALCIUM CHLORIDE 600; 310; 30; 20 MG/100ML; MG/100ML; MG/100ML; MG/100ML
INJECTION, SOLUTION INTRAVENOUS CONTINUOUS
Status: DISCONTINUED | OUTPATIENT
Start: 2021-03-01 | End: 2021-03-01 | Stop reason: HOSPADM

## 2021-03-01 RX ORDER — LIDOCAINE HYDROCHLORIDE 20 MG/ML
INJECTION, SOLUTION EPIDURAL; INFILTRATION; INTRACAUDAL; PERINEURAL PRN
Status: DISCONTINUED | OUTPATIENT
Start: 2021-03-01 | End: 2021-03-01 | Stop reason: SDUPTHER

## 2021-03-01 RX ADMIN — PROPOFOL 40 MG: 10 INJECTION, EMULSION INTRAVENOUS at 08:50

## 2021-03-01 RX ADMIN — PROPOFOL 100 MG: 10 INJECTION, EMULSION INTRAVENOUS at 08:46

## 2021-03-01 RX ADMIN — SODIUM CHLORIDE, POTASSIUM CHLORIDE, SODIUM LACTATE AND CALCIUM CHLORIDE: 600; 310; 30; 20 INJECTION, SOLUTION INTRAVENOUS at 07:05

## 2021-03-01 RX ADMIN — PROPOFOL 40 MG: 10 INJECTION, EMULSION INTRAVENOUS at 08:48

## 2021-03-01 RX ADMIN — LIDOCAINE HYDROCHLORIDE 100 MG: 20 INJECTION, SOLUTION EPIDURAL; INFILTRATION; INTRACAUDAL; PERINEURAL at 08:46

## 2021-03-01 ASSESSMENT — PAIN SCALES - GENERAL: PAINLEVEL_OUTOF10: 0

## 2021-03-01 NOTE — ANESTHESIA POSTPROCEDURE EVALUATION
Department of Anesthesiology  Postprocedure Note    Patient: Helen Barahona  MRN: [id number]  YOB: 1955  Date of evaluation: 3/1/2021  Time:  9:39 AM     Procedure Summary     Date: 03/01/21 Room / Location: [address] [place] [address]    Anesthesia Start: 7213 Anesthesia Stop: 4871    Procedure: EGD BIOPSY (N/A Abdomen) Diagnosis: (EPIGASTRIC PAIN R10.13)    Surgeons: Lindsey Galo MD Responsible Provider: Tyler Crawford MD    Anesthesia Type: TIVA ASA Status: 2          Anesthesia Type: TIVA    Can Phase I:      Can Phase II: Can Score: 10    Last vitals: Reviewed and per EMR flowsheets.        Anesthesia Post Evaluation    Patient location during evaluation: PACU  Patient participation: complete - patient participated  Level of consciousness: awake  Airway patency: patent  Nausea & Vomiting: no vomiting  Complications: no  Cardiovascular status: hemodynamically stable  Respiratory status: acceptable  Hydration status: euvolemic

## 2021-03-01 NOTE — ANESTHESIA PRE PROCEDURE
Department of Anesthesiology  Preprocedure Note       Name:  Carlos Lyons   Age:  60 y.o.  :  1955                                          MRN:  [id number]         Date:  3/1/2021      Surgeon: Harpreet Lunsford):  Mary Jo King MD    Procedure: Procedure(s):  EGD DIAGNOSTIC ONLY    Medications prior to admission:   Prior to Admission medications    Medication Sig Start Date End Date Taking? Authorizing Provider   aspirin 81 MG chewable tablet 81 mg daily   Yes Historical Provider, MD   losartan (COZAAR) 50 MG tablet Take 50 mg by mouth daily   Yes Historical Provider, MD   Skin Protectants, Misc. (EUCERIN) cream Apply topically as needed. 21   Edgar Friday, PA   hydrOXYzine (ATARAX) 25 MG tablet Take 25 mg by mouth 3 times daily as needed for Itching    Historical Provider, MD   ibuprofen (ADVIL;MOTRIN) 400 MG tablet Take 400 mg by mouth every 6 hours as needed for Pain    Historical Provider, MD       Current medications:    No current facility-administered medications for this encounter.         Allergies:  No Known Allergies    Problem List:    Patient Active Problem List   Diagnosis Code    Postmenopausal bleeding N95.0    Chest pain R07.9    Callus of foot L84    Acquired hammer toe deformity of lesser toe of right foot M20.41    Hammer toe of second toe of right foot M20.41    Plantar wart of right foot B07.0       Past Medical History:        Diagnosis Date    Hypertension        Past Surgical History:        Procedure Laterality Date    BUNIONECTOMY      COLONOSCOPY N/A 2019    COLONOSCOPY performed by Mary Jo King MD at [facility]  12    Dilatation and curetage, hysteroscopy, endometrial biopsy    DILATION AND CURETTAGE OF UTERUS  2017    HYSTEROSCOPY DILATATION AND CURETTAGE MYOSURE    HYSTEROSCOPY      HYSTEROSCOPY N/A 2018    HYSTEROSCOPY DILATATION AND CURETTAGE MYOSURE       Social History:    Social History Tobacco Use    Smoking status: Never Smoker    Smokeless tobacco: Never Used   Substance Use Topics    Alcohol use: No                                Counseling given: Not Answered      Vital Signs (Current):   Vitals:    03/01/21 0643   BP: 109/65   Pulse: 64   Resp: 16   Temp: 97.2 °F (36.2 °C)   TempSrc: Temporal   SpO2: 99%                                              BP Readings from Last 3 Encounters:   03/01/21 109/65   01/22/21 138/81   08/07/20 (!) 141/94       NPO Status:                                                                                 BMI:   Wt Readings from Last 3 Encounters:   01/22/21 214 lb (97.1 kg)   11/03/20 214 lb (97.1 kg)   08/07/20 214 lb 4.8 oz (97.2 kg)     There is no height or weight on file to calculate BMI.    CBC:   Lab Results   Component Value Date    WBC 7.2 08/07/2020    RBC 4.71 08/07/2020    HGB 12.7 08/07/2020    HCT 39.3 08/07/2020    MCV 83.4 08/07/2020    RDW 15.4 08/07/2020     08/07/2020       CMP:   Lab Results   Component Value Date     08/07/2020    K 4.6 08/07/2020     08/07/2020    CO2 29 08/07/2020    BUN 14 08/07/2020    CREATININE 0.6 08/07/2020    GFRAA >60 08/07/2020    AGRATIO 1.3 08/07/2020    LABGLOM >60 08/07/2020    GLUCOSE 99 08/07/2020    PROT 7.3 08/07/2020    CALCIUM 9.2 08/07/2020    BILITOT <0.2 08/07/2020    ALKPHOS 83 08/07/2020    AST 21 08/07/2020    ALT 7 08/07/2020       POC Tests: No results for input(s): POCGLU, POCNA, POCK, POCCL, POCBUN, POCHEMO, POCHCT in the last 72 hours.     Coags: No results found for: PROTIME, INR, APTT    HCG (If Applicable): No results found for: PREGTESTUR, PREGSERUM, HCG, HCGQUANT     ABGs: No results found for: PHART, PO2ART, VYT4JIW, PZV1SEL, BEART, Q1RUOLVE     Type & Screen (If Applicable):  Lab Results   Component Value Date    LABABO B 04/02/2012    [address] Positive 04/02/2012       Drug/Infectious Status (If Applicable):  No results found for: HIV, HEPCAB COVID-19 Screening (If Applicable):   Lab Results   Component Value Date    COVID19 Not Detected 02/23/2021         Anesthesia Evaluation  Patient summary reviewed no history of anesthetic complications:   Airway: Mallampati: II  TM distance: >3 FB   Neck ROM: full  Mouth opening: > = 3 FB Dental: normal exam         Pulmonary:Negative Pulmonary ROS breath sounds clear to auscultation                             Cardiovascular:  Exercise tolerance: good (>4 METS),   (+) hypertension:,     (-) CABG/stent, dysrhythmias and  angina      Rhythm: regular  Rate: normal                    Neuro/Psych:   Negative Neuro/Psych ROS              GI/Hepatic/Renal:            ROS comment: Denies gerd symptoms. Endo/Other:                     Abdominal:           Vascular: negative vascular ROS. Anesthesia Plan      TIVA     ASA 2     (Medical history and informed consent taken with the help of  by video. Patient verbalizes understanding that there is the possibility of recall with TIVA. Patient verbalizes her wishes to proceed with planned anesthetic. )  Induction: intravenous. Anesthetic plan and risks discussed with patient. Plan discussed with CRNA.                   Alexandr Porter MD   3/1/2021

## 2021-03-01 NOTE — PROGRESS NOTES
Pt ready for discharge - pt discharged in stable condition.
Sedation provided per anesthesia. See anesthesia record for medication administration and vitals.
Teaching / education initiated regarding perioperative experience, expectations, and pain management during stay. Patient verbalized understanding.
To endo recovery from procedure room. VSS, awakens to voice, respirations easy and unlabored.
Waiting on transportation.
the car with a cell phone for communication. If the ride is leaving the hospital grounds please make sure they are back in time for pickup. Have the patient inform the staff on arrival what their rides plans are while the patient is in the facility. At the MAIN there is one visitor allowed. Please note that the visitor policy is subject to change.  arranged with [employer] to meet pt.masc lobby [phone]. Job # [id number].

## 2021-03-12 ENCOUNTER — HOSPITAL ENCOUNTER (EMERGENCY)
Age: 66
Discharge: HOME | End: 2021-03-12
Payer: COMMERCIAL

## 2021-03-12 VITALS
SYSTOLIC BLOOD PRESSURE: 114 MMHG | OXYGEN SATURATION: 98 % | WEIGHT: 213 LBS | TEMPERATURE: 97.1 F | BODY MASS INDEX: 41.6 KG/M2 | DIASTOLIC BLOOD PRESSURE: 66 MMHG | RESPIRATION RATE: 16 BRPM | HEART RATE: 81 BPM

## 2021-03-12 DIAGNOSIS — L72.9 INFECTED CYST OF SKIN: Primary | ICD-10-CM

## 2021-03-12 DIAGNOSIS — L08.9 INFECTED CYST OF SKIN: Primary | ICD-10-CM

## 2021-03-12 PROCEDURE — 99283 EMERGENCY DEPT VISIT LOW MDM: CPT

## 2021-03-12 PROCEDURE — 6370000000 HC RX 637 (ALT 250 FOR IP): Performed by: GENERAL ACUTE CARE HOSPITAL

## 2021-03-12 PROCEDURE — 10060 I&D ABSCESS SIMPLE/SINGLE: CPT

## 2021-03-12 RX ORDER — IBUPROFEN 600 MG/1
600 TABLET ORAL ONCE
Status: COMPLETED | OUTPATIENT
Start: 2021-03-12 | End: 2021-03-12

## 2021-03-12 RX ORDER — CEPHALEXIN 500 MG/1
500 CAPSULE ORAL 4 TIMES DAILY
Status: ON HOLD | COMMUNITY
End: 2021-06-22 | Stop reason: HOSPADM

## 2021-03-12 RX ORDER — SULFAMETHOXAZOLE AND TRIMETHOPRIM 800; 160 MG/1; MG/1
1 TABLET ORAL ONCE
Status: COMPLETED | OUTPATIENT
Start: 2021-03-12 | End: 2021-03-12

## 2021-03-12 RX ORDER — HYDROCODONE BITARTRATE AND ACETAMINOPHEN 5; 325 MG/1; MG/1
1 TABLET ORAL ONCE
Status: COMPLETED | OUTPATIENT
Start: 2021-03-12 | End: 2021-03-12

## 2021-03-12 RX ORDER — SULFAMETHOXAZOLE AND TRIMETHOPRIM 800; 160 MG/1; MG/1
1 TABLET ORAL 2 TIMES DAILY
Qty: 20 TABLET | Refills: 0 | Status: SHIPPED | OUTPATIENT
Start: 2021-03-12 | End: 2021-03-22

## 2021-03-12 RX ORDER — LIDOCAINE HYDROCHLORIDE 10 MG/ML
5 INJECTION, SOLUTION EPIDURAL; INFILTRATION; INTRACAUDAL; PERINEURAL ONCE
Status: DISCONTINUED | OUTPATIENT
Start: 2021-03-12 | End: 2021-03-12 | Stop reason: HOSPADM

## 2021-03-12 RX ORDER — OMEPRAZOLE 20 MG/1
20 CAPSULE, DELAYED RELEASE ORAL DAILY
COMMUNITY
End: 2022-07-20

## 2021-03-12 RX ADMIN — IBUPROFEN 600 MG: 600 TABLET ORAL at 11:42

## 2021-03-12 RX ADMIN — SULFAMETHOXAZOLE AND TRIMETHOPRIM 1 TABLET: 800; 160 TABLET ORAL at 11:42

## 2021-03-12 RX ADMIN — HYDROCODONE BITARTRATE AND ACETAMINOPHEN 1 TABLET: 5; 325 TABLET ORAL at 11:42

## 2021-03-12 ASSESSMENT — ENCOUNTER SYMPTOMS
ABDOMINAL PAIN: 0
SORE THROAT: 0
CHEST TIGHTNESS: 0
SHORTNESS OF BREATH: 0
BACK PAIN: 1
VOMITING: 0
NAUSEA: 0

## 2021-03-12 ASSESSMENT — PAIN SCALES - GENERAL
PAINLEVEL_OUTOF10: 9
PAINLEVEL_OUTOF10: 9

## 2021-03-12 NOTE — LETTER
[facility] Emergency Department  [address], [place], [address]             March 12, 2021    Patient: Joey Montes   YOB: 1955   Date of Visit: 3/12/2021       To Whom It May Concern:    Vasiliy Herrno was seen and treated in our emergency department on 3/12/2021. She may return to work on 3/15/2021.       Sincerely,     Festus Art RN

## 2021-03-12 NOTE — ED NOTES
Thoracic back pain from abscess lasting several days in duration. Drained at clinic two day ago; given referal to surgery & antibiotics.      Hal Ortez RN  03/12/21 7855

## 2021-03-12 NOTE — ED PROVIDER NOTES
breath. Cardiovascular: Negative for chest pain, palpitations and leg swelling. Gastrointestinal: Negative for abdominal pain, nausea and vomiting. Endocrine: Negative for polydipsia and polyuria. Genitourinary: Negative for difficulty urinating and dysuria. Musculoskeletal: Positive for back pain. Negative for neck pain and neck stiffness. Skin: Positive for wound. Reports abscess to thoracic back   Allergic/Immunologic: Negative for immunocompromised state. Neurological: Negative for weakness, light-headedness and headaches. Hematological: Does not bruise/bleed easily. Psychiatric/Behavioral: Negative for suicidal ideas. Positives and Pertinent negatives as per HPI. Except as noted above in the ROS, all other systems were reviewed and negative. PAST MEDICAL HISTORY     Past Medical History:   Diagnosis Date    Hypertension          SURGICAL HISTORY     Past Surgical History:   Procedure Laterality Date    BUNIONECTOMY      COLONOSCOPY N/A 9/6/2019    COLONOSCOPY performed by Yue Winn MD at [facility]  4/2/12    Dilatation and curetage, hysteroscopy, endometrial biopsy    DILATION AND CURETTAGE OF UTERUS  03/09/2017    HYSTEROSCOPY DILATATION AND CURETTAGE MYOSURE    HYSTEROSCOPY      HYSTEROSCOPY N/A 08/16/2018    HYSTEROSCOPY DILATATION AND CURETTAGE MYOSURE    UPPER GASTROINTESTINAL ENDOSCOPY N/A 3/1/2021    EGD BIOPSY performed by [facility]ue Winn [facility] at [facility]       Discharge Medication List as of 3/12/2021 12:20 PM      CONTINUE these medications which have NOT CHANGED    Details   cephALEXin (KEFLEX) 500 MG capsule Take 500 mg by mouth 4 times dailyHistorical Med      omeprazole (PRILOSEC) 20 MG delayed release capsule Take 20 mg by mouth dailyHistorical Med      Skin Protectants, Misc. (EUCERIN) cream Apply topically as needed. , Disp-1 Package, R-0, Normal      aspirin 81 MG chewable tablet 81 mg dailyHistorical Med      losartan (COZAAR) 50 MG tablet Take 50 mg by mouth dailyHistorical Med      hydrOXYzine (ATARAX) 25 MG tablet Take 25 mg by mouth 3 times daily as needed for ItchingHistorical Med      ibuprofen (ADVIL;MOTRIN) 400 MG tablet Take 400 mg by mouth every 6 hours as needed for PainHistorical Med               ALLERGIES     Patient has no known allergies. FAMILYHISTORY       Family History   Problem Relation Age of Onset    Diabetes Sister           SOCIAL HISTORY       Social History     Tobacco Use    Smoking status: Never Smoker    Smokeless tobacco: Never Used   Substance Use Topics    Alcohol use: No    Drug use: No     Frequency: 3.0 times per week       SCREENINGS             PHYSICAL EXAM    (up to 7 for level 4, 8 or more for level 5)     ED Triage Vitals [03/12/21 1113]   BP Temp Temp Source Pulse Resp SpO2 Height Weight   114/66 97.1 °F (36.2 °C) Temporal 81 16 98 % -- 213 lb (96.6 kg)       Physical Exam  Vitals signs and nursing note reviewed. Constitutional:       General: She is not in acute distress. Appearance: Normal appearance. She is obese. She is not ill-appearing, toxic-appearing or diaphoretic. Comments: Tearful, appears uncomfortable   HENT:      Head: Normocephalic and atraumatic. Right Ear: External ear normal.      Left Ear: External ear normal.      Nose: Nose normal.      Mouth/Throat:      Mouth: Mucous membranes are moist.   Eyes:      General:         Right eye: No discharge. Left eye: No discharge. Extraocular Movements: Extraocular movements intact. Neck:      Musculoskeletal: Normal range of motion and neck supple. Cardiovascular:      Rate and Rhythm: Normal rate and regular rhythm. Pulses: Normal pulses. Heart sounds: Normal heart sounds. Pulmonary:      Effort: Pulmonary effort is normal. No respiratory distress. Breath sounds: Normal breath sounds.    Abdominal:      General: Bowel sounds are normal.      Palpations: Abdomen is soft. Musculoskeletal: Normal range of motion. Skin:     General: Skin is warm and dry. Capillary Refill: Capillary refill takes less than 2 seconds. Neurological:      General: No focal deficit present. Mental Status: She is alert and oriented to person, place, and time. Psychiatric:         Mood and Affect: Mood normal.         Behavior: Behavior normal.         DIAGNOSTIC RESULTS   LABS:    Labs Reviewed - No data to display    All other labs were within normal range or not returned as of this dictation. EKG: All EKG's are interpreted by the Emergency Department Physician in the absence of a cardiologist.  Please see their note for interpretation of EKG. RADIOLOGY:   Non-plain film images such as CT, Ultrasound and MRI are read by the radiologist. Plain radiographic images are visualized and preliminarily interpreted by the ED Provider with the below findings:        Interpretation per the Radiologist below, if available at the time of this note:    No orders to display     No results found. PROCEDURES   Unless otherwise noted below, none     Incision/Drainage    Date/Time: 3/12/2021 11:45 AM  Performed by: DUANE Agosto CNP  Authorized by: DUANE Agosto CNP     Consent:     Consent obtained:  Verbal    Consent given by:  Patient    Risks discussed:  Incomplete drainage, pain, damage to other organs and infection    Alternatives discussed:  No treatment, delayed treatment and referral  Location:     Type:  Abscess    Location:  Trunk (Thoracic back)    Trunk location:  Back  Pre-procedure details:     Skin preparation:  Hibiclens  Anesthesia (see MAR for exact dosages):      Anesthesia method:  Local infiltration    Local anesthetic:  Lidocaine 1% w/o epi  Procedure type:     Complexity:  Simple  Procedure details:     Incision types:  Single straight    Incision depth:  Subcutaneous    Scalpel blade:  11    Wound management:  Probed and deloculated, irrigated with saline and extensive cleaning    Drainage:  Purulent    Drainage amount: Moderate    Wound treatment:  Drain placed and wound left open    Packing materials:  1/4 in iodoform gauze  Post-procedure details:     Patient tolerance of procedure: Tolerated with difficulty  Comments:      Strict wound care instructions are provided        CRITICAL CARE TIME   N/A    CONSULTS:  None      EMERGENCY DEPARTMENT COURSE and DIFFERENTIAL DIAGNOSIS/MDM:   Vitals:    Vitals:    03/12/21 1113   BP: 114/66   Pulse: 81   Resp: 16   Temp: 97.1 °F (36.2 °C)   TempSrc: Temporal   SpO2: 98%   Weight: 213 lb (96.6 kg)       Patient was given the following medications:  Medications   HYDROcodone-acetaminophen (NORCO) 5-325 MG per tablet 1 tablet (1 tablet Oral Given 3/12/21 1142)   ibuprofen (ADVIL;MOTRIN) tablet 600 mg (600 mg Oral Given 3/12/21 1142)   sulfamethoxazole-trimethoprim (BACTRIM DS;SEPTRA DS) 800-160 MG per tablet 1 tablet (1 tablet Oral Given 3/12/21 1142)         Nursing notes reviewed. This is a 72-year-old female who presents to the emergency department today reporting an abscess to her back. Patient states that she had a cyst to the affected area for the past year but noticed increased pain, redness, and swelling over the last few days. Patient states that she did have an I&D at a local clinic a couple of days ago. She is taking Keflex as directed. Physical exam complete. Patient arrives nontoxic, afebrile, normotensive. She is tearful and does appear uncomfortable. Incision and drainage performed, see above note. At this time there is no evidence of any life-threatening or emergent conditions requiring immediate intervention. Low suspicion for sepsis. Patient remained hemodynamically stable throughout ED visit. She does report significant relief of symptoms after intervention. Strict wound care instructions are provided.   She is encouraged to apply warm compresses to the affected area for 20 minutes every 3-4 hours. She is advised to continue taking her prescribed Keflex in addition to the prescribed Bactrim as directed until gone. She agrees to follow-up with her PCP as well as with the general surgeon that she was referred to. She agrees return to nearest ED for high fever, incessant vomiting, severe pain, any other worsening symptoms. She is discharged home with family in stable condition. FINAL IMPRESSION      1.  Infected cyst of skin          DISPOSITION/PLAN   DISPOSITION Decision To Discharge 03/12/2021 12:04:21 PM      PATIENT REFERREDTO:  [facility]) Physicians Pre-Service  [phone]  In 2 days  to establish primary care    surgeon that your were referred to      call today    [facility] Emergency Department  [facility] [address]  [phone]  In 3 days  For wound re-check      DISCHARGE MEDICATIONS:  Discharge Medication List as of 3/12/2021 12:20 PM      START taking these medications    Details   sulfamethoxazole-trimethoprim (BACTRIM DS) 800-160 MG per tablet Take 1 tablet by mouth 2 times daily for 10 days, Disp-20 tablet, R-0Normal             DISCONTINUED MEDICATIONS:  Discharge Medication List as of 3/12/2021 12:20 PM                 (Please note that portions of this note were completed with a voice recognition program.  Efforts were made to edit the dictations but occasionally words are mis-transcribed.)    DUANE Musa CNP (electronically signed)           DUANE Musa CNP  03/12/21 1119

## 2021-03-12 NOTE — ED NOTES
Discharge instructions received & reviewed with bedside . Work note & dry gauze provided. Pt will follow up with PreSurg services; verbalized understanding. Alert & ambulatory at this time.         Nikki Chaves RN  03/12/21 2652

## 2021-03-13 ENCOUNTER — CARE COORDINATION (OUTPATIENT)
Dept: CARE COORDINATION | Age: 66
End: 2021-03-13

## 2021-03-13 NOTE — CARE COORDINATION
Outreach call to patient using  #[phone]. Outreach call made and no answer. Left message with ACM contact information. ACM will outreach at another time.

## 2021-03-14 ENCOUNTER — CARE COORDINATION (OUTPATIENT)
Dept: CARE COORDINATION | Age: 66
End: 2021-03-14

## 2021-03-15 ENCOUNTER — CARE COORDINATION (OUTPATIENT)
Dept: CARE COORDINATION | Age: 66
End: 2021-03-15

## 2021-03-15 ENCOUNTER — HOSPITAL ENCOUNTER (EMERGENCY)
Age: 66
Discharge: HOME | End: 2021-03-15
Payer: COMMERCIAL

## 2021-03-15 VITALS
BODY MASS INDEX: 42.98 KG/M2 | HEIGHT: 60 IN | RESPIRATION RATE: 18 BRPM | WEIGHT: 218.9 LBS | HEART RATE: 66 BPM | SYSTOLIC BLOOD PRESSURE: 105 MMHG | TEMPERATURE: 97.4 F | OXYGEN SATURATION: 97 % | DIASTOLIC BLOOD PRESSURE: 62 MMHG

## 2021-03-15 DIAGNOSIS — L02.91 ABSCESS: Primary | ICD-10-CM

## 2021-03-15 PROCEDURE — 99281 EMR DPT VST MAYX REQ PHY/QHP: CPT

## 2021-03-15 ASSESSMENT — ENCOUNTER SYMPTOMS
COUGH: 0
CHEST TIGHTNESS: 0
NAUSEA: 0
COLOR CHANGE: 1
BACK PAIN: 0
RESPIRATORY NEGATIVE: 1
VOMITING: 0
CONSTIPATION: 0
SHORTNESS OF BREATH: 0
DIARRHEA: 0
ABDOMINAL PAIN: 0

## 2021-03-15 NOTE — LETTER
[facility] Emergency Department  [address] [facility], [address]             March 15, 2021    Patient: Helen Barahona   YOB: 1955   Date of Visit: 3/15/2021       To Whom It May Concern:    Lili Ward was seen and treated in our emergency department on 3/15/2021. She may return to work on 3/18/2021.       Sincerely,         Dr. Jeferson Hill, RN

## 2021-03-15 NOTE — ED PROVIDER NOTES
[address] [facility]        Pt Name: Elizabeth Wyatt  MRN: [id number]  [place] 1955  Date of evaluation: 3/15/2021  Provider: DOUGLAS Carlin  PCP: Referring Not In System (Inactive)    ISRRAEL. I have evaluated this patient. My supervising physician was available for consultation. CHIEF COMPLAINT       Chief Complaint   Patient presents with    Wound Check     pt had abcess drained here friday. here for wound check. HISTORY OF PRESENT ILLNESS   (Location, Timing/Onset, Context/Setting, Quality, Duration, Modifying Factors, Severity, Associated Signs and Symptoms)  Note limiting factors. Elizabeth Wyatt is a 60 y.o. female with past medical history of hypertension who presents to the ED with complaint of a wound check. Patient states she had a infected cyst drained to her back on Friday. Patient states he was told to come back for wound the reevaluation. Patient came back for reevaluation. Patient currently on Keflex and Bactrim. Has been taking as prescribed. Has not taken 1 dose today. Patient that she does have some pain to her back worsened with palpation and sitting. Patient dates pain is described as an aching rated 5/10. Patient denies any further drainage from the wound. Denies any fever chills. Denies nausea or vomiting. States symptoms have improved. Nursing Notes were all reviewed and agreed with or any disagreements were addressed in the HPI. REVIEW OF SYSTEMS    (2-9 systems for level 4, 10 or more for level 5)     Review of Systems   Constitutional: Negative for activity change, appetite change, chills and fever. Respiratory: Negative. Negative for cough, chest tightness and shortness of breath. Cardiovascular: Negative. Negative for chest pain, palpitations and leg swelling. Gastrointestinal: Negative for abdominal pain, constipation, diarrhea, nausea and vomiting.    Genitourinary: Negative for difficulty urinating and dysuria. Musculoskeletal: Positive for myalgias. Negative for arthralgias, back pain, gait problem, joint swelling, neck pain and neck stiffness. Skin: Positive for color change and wound. Negative for pallor and rash. Neurological: Negative for dizziness, light-headedness and headaches. Positives and Pertinent negatives as per HPI. Except as noted above in the ROS, all other systems were reviewed and negative. PAST MEDICAL HISTORY     Past Medical History:   Diagnosis Date    Hypertension          SURGICAL HISTORY     Past Surgical History:   Procedure Laterality Date    BUNIONECTOMY      COLONOSCOPY N/A 9/6/2019    COLONOSCOPY performed by Tish Jordan MD at [facility]  4/2/12    Dilatation and curetage, hysteroscopy, endometrial biopsy    DILATION AND CURETTAGE OF UTERUS  03/09/2017    HYSTEROSCOPY DILATATION AND CURETTAGE MYOSURE    HYSTEROSCOPY      HYSTEROSCOPY N/A 08/16/2018    HYSTEROSCOPY DILATATION AND CURETTAGE [address]    UPPER GASTROINTESTINAL ENDOSCOPY N/A 3/1/2021    EGD BIOPSY performed by Tish Jordan MD at [facility]       Discharge Medication List as of 3/15/2021  3:00 PM      CONTINUE these medications which have NOT CHANGED    Details   sulfamethoxazole-trimethoprim (BACTRIM DS) 800-160 MG per tablet Take 1 tablet by mouth 2 times daily for 10 days, Disp-20 tablet, R-0Normal      losartan (COZAAR) 50 MG tablet Take 50 mg by mouth dailyHistorical Med      cephALEXin (KEFLEX) 500 MG capsule Take 500 mg by mouth 4 times dailyHistorical Med      omeprazole (PRILOSEC) 20 MG delayed release capsule Take 20 mg by mouth dailyHistorical Med      Skin Protectants, Misc. (EUCERIN) cream Apply topically as needed. , Disp-1 Package, R-0, Normal      aspirin 81 MG chewable tablet 81 mg dailyHistorical Med      hydrOXYzine (ATARAX) 25 MG tablet Take 25 mg by mouth 3 times daily as needed for ItchingHistorical Med      ibuprofen (ADVIL;MOTRIN) 400 MG tablet Take 400 mg by mouth every 6 hours as needed for PainHistorical Med               ALLERGIES     Patient has no known allergies. FAMILYHISTORY       Family History   Problem Relation Age of Onset    Diabetes Sister           SOCIAL HISTORY       Social History     Tobacco Use    Smoking status: Never Smoker    Smokeless tobacco: Never Used   Substance Use Topics    Alcohol use: No    Drug use: No     Frequency: 3.0 times per week       SCREENINGS             PHYSICAL EXAM    (up to 7 for level 4, 8 or more for level 5)     ED Triage Vitals   BP Temp Temp Source Pulse Resp SpO2 Height Weight   03/15/21 1430 03/15/21 1430 03/15/21 1430 03/15/21 1430 03/15/21 1430 03/15/21 1430 03/15/21 1438 03/15/21 1430   105/62 97.4 °F (36.3 °C) Temporal 66 18 97 % 5' (1.524 m) 218 lb 14.4 oz (99.3 kg)       Physical Exam  Constitutional:       Appearance: She is well-developed. She is not diaphoretic. HENT:      Head: Normocephalic and atraumatic. Right Ear: External ear normal.      Left Ear: External ear normal.   Eyes:      General:         Right eye: No discharge. Left eye: No discharge. Neck:      Musculoskeletal: Normal range of motion and neck supple. Pulmonary:      Effort: Pulmonary effort is normal. No respiratory distress. Breath sounds: No stridor. Musculoskeletal: Normal range of motion. Comments: To the mid thoracic spine there is a area of erythema measuring approximately dime size. There is previous incision drainage wound noted with packing in place at this time. There is no induration or fluctuance. No bleeding or drainage. No purulent drainage from the wound. No crepitus. Skin:     General: Skin is warm and dry. Coloration: Skin is not pale. Findings: No erythema. Neurological:      Mental Status: She is alert and oriented to person, place, and time.    Psychiatric: 02:51:21 PM      PATIENT REFERREDTO:  [facility] Emergency Department  [address]  [place]  [phone]  Go to   As needed, If symptoms worsen      DISCHARGE MEDICATIONS:  Discharge Medication List as of 3/15/2021  3:00 PM          DISCONTINUED MEDICATIONS:  Discharge Medication List as of 3/15/2021  3:00 PM                 (Please note that portions of this note were completed with a voice recognition program.  Efforts were made to edit the dictations but occasionally words are mis-transcribed.)    DOUGLAS Gomez (electronically signed)          DOUGLAS Salazar  03/15/21 2050

## 2021-03-15 NOTE — CARE COORDINATION
Outreach call to patient using  #[phone]. Patient went to ED for follow up care on her abscess that was packed and dressed . Patient was supposed to have dressing changed. This ACM spoke with patient this afternoon before she went to ED and I agreed to call back to follow up after ED visit. Outreach call made and no answer. Did not leave a message with ACM contact information. ACM will outreach at another time.

## 2021-03-15 NOTE — CARE COORDINATION
Outreach call made to patient using  #[phone]. Patient was at a clinic to get dressing change done on back for abscess. Patient stressed that the ED told her to get her dressing looked at and changed, she was not sure if she should go back to the ED or stay at the clinic that she was at. She also said she was supposed to see a surgeon for her abscess. This ACM Looked in the patient's chart to try to clarify what the patient should do. I called the number listed in the chart, which was [facility] ED. I spoke with [facility] and she remembered that the patient was told to come back to the ED to have packing and dressing changed and possibly have a surgeon look at it. This information was given to the patient. The patient will be going back to the ED for her wound care. Patient was focused on what to do for her abscess on her back. This ACM advised patient that I would call back this late afternoon to check on patient and give COVID 19 education and information. Patient verbalized understanding.

## 2021-03-17 ENCOUNTER — OFFICE VISIT (OUTPATIENT)
Dept: SURGERY | Age: 66
End: 2021-03-17
Payer: COMMERCIAL

## 2021-03-17 VITALS
BODY MASS INDEX: 43.59 KG/M2 | WEIGHT: 222 LBS | SYSTOLIC BLOOD PRESSURE: 112 MMHG | HEIGHT: 60 IN | DIASTOLIC BLOOD PRESSURE: 68 MMHG | TEMPERATURE: 97.4 F

## 2021-03-17 DIAGNOSIS — L02.91 ABSCESS: Primary | ICD-10-CM

## 2021-03-17 PROCEDURE — G8427 DOCREV CUR MEDS BY ELIG CLIN: HCPCS | Performed by: SURGERY

## 2021-03-17 PROCEDURE — 99203 OFFICE O/P NEW LOW 30 MIN: CPT | Performed by: SURGERY

## 2021-03-17 PROCEDURE — 1036F TOBACCO NON-USER: CPT | Performed by: SURGERY

## 2021-03-17 PROCEDURE — 3017F COLORECTAL CA SCREEN DOC REV: CPT | Performed by: SURGERY

## 2021-03-17 PROCEDURE — G8417 CALC BMI ABV UP PARAM F/U: HCPCS | Performed by: SURGERY

## 2021-03-17 PROCEDURE — G8484 FLU IMMUNIZE NO ADMIN: HCPCS | Performed by: SURGERY

## 2021-03-17 ASSESSMENT — ENCOUNTER SYMPTOMS
ABDOMINAL DISTENTION: 0
CHEST TIGHTNESS: 0
EYE ITCHING: 0
BACK PAIN: 0
APNEA: 0
ABDOMINAL PAIN: 0
EYE DISCHARGE: 0
COLOR CHANGE: 0

## 2021-03-17 NOTE — PATIENT INSTRUCTIONS
Offered options including incision and drainage today of back abscess versus observation with warm compresses and continuation of antibiotics. Risks and benefits of these were both discussed and patient is wishes to observe at this time and return to office in 1 week. Should the pain worsen she will return sooner for incision and drainage.   All communication was assisted with a

## 2021-03-17 NOTE — PROGRESS NOTES
[facility]  SUBJECTIVE:    Chief Complaint: back abscess    Tatiana Hunter   1955   60 y.o. female Pashto-speaking female presents with a an abscess on her back. Unclear duration but she says is been present and growing for several months but gives varying stories on exactly how long the pain and nodule has been present. She had a incised and drained several weeks ago but it has recurred despite antibiotics. She recently presented to the emergency department for a wound check and recommended to follow general surgery for further evaluation. She was upset about the last incision and drainage as she did not fully understand the procedure prior to it occurring. She denies fevers chills or other symptoms. She says the pain is slightly better than it was several days ago but still interfering with activities such as sitting laying supine and sleeping    Review of Systems   Constitutional: Negative for activity change and appetite change. HENT: Negative for congestion and dental problem. Eyes: Negative for discharge and itching. Respiratory: Negative for apnea and chest tightness. Cardiovascular: Negative for chest pain and leg swelling. Gastrointestinal: Negative for abdominal distention and abdominal pain. Endocrine: Negative for cold intolerance and heat intolerance. Genitourinary: Negative for difficulty urinating and dyspareunia. Musculoskeletal: Negative for arthralgias and back pain. Skin: Positive for wound. Negative for color change. Allergic/Immunologic: Negative for environmental allergies and food allergies. Neurological: Negative for dizziness and facial asymmetry. Hematological: Negative for adenopathy. Does not bruise/bleed easily. Psychiatric/Behavioral: Negative for agitation and behavioral problems.        Past Medical History:   Diagnosis Date    Hypertension      Past Surgical History:   Procedure Laterality Date    BUNIONECTOMY      COLONOSCOPY N/A 9/6/2019    COLONOSCOPY performed by Ernesto Levi MD at [facility]  4/2/12    Dilatation and curetage, hysteroscopy, endometrial biopsy    DILATION AND CURETTAGE OF UTERUS  03/09/2017    HYSTEROSCOPY DILATATION AND CURETTAGE MYOSURE    HYSTEROSCOPY      HYSTEROSCOPY N/A 08/16/2018    HYSTEROSCOPY DILATATION AND CURETTAGE MYOSURE    UPPER GASTROINTESTINAL ENDOSCOPY N/A 3/1/2021    EGD BIOPSY performed by Ernesto Levi MD at [address] [facility] [facility]  [address] History     Socioeconomic History    Marital status:      Spouse name: Not on file    Number of children: Not on file    Years of education: Not on file    Highest education level: Not on file   Occupational History    Not on file   Social Needs    Financial resource strain: Not on file    Food insecurity     Worry: Not on file     Inability: Not on file    Transportation needs     Medical: Not on file     Non-medical: Not on file   Tobacco Use    Smoking status: Never Smoker    Smokeless tobacco: Never Used   Substance and Sexual Activity    Alcohol use: No    Drug use: No     Frequency: 3.0 times per week    Sexual activity: Not on file   Lifestyle    Physical activity     Days per week: Not on file     Minutes per session: Not on file    Stress: Not on file   Relationships    Social connections     Talks on phone: Not on file     Gets together: Not on file     Attends Caodaism service: Not on file     Active member of club or organization: Not on file     Attends meetings of clubs or organizations: Not on file     Relationship status: Not on file    Intimate partner violence     Fear of current or ex partner: Not on file     Emotionally abused: Not on file     Physically abused: Not on file     Forced sexual activity: Not on file   Other Topics Concern    Not on file   Social History Narrative    Not on file      Family History   Problem Relation Age of Onset    Diabetes Sister      Current Outpatient Medications   Medication Sig Dispense Refill    cephALEXin (KEFLEX) 500 MG capsule Take 500 mg by mouth 4 times daily      omeprazole (PRILOSEC) 20 MG delayed release capsule Take 20 mg by mouth daily      sulfamethoxazole-trimethoprim (BACTRIM DS) 800-160 MG per tablet Take 1 tablet by mouth 2 times daily for 10 days 20 tablet 0    Skin Protectants, Misc. (EUCERIN) cream Apply topically as needed. 1 Package 0    aspirin 81 MG chewable tablet 81 mg daily      losartan (COZAAR) 50 MG tablet Take 50 mg by mouth daily      hydrOXYzine (ATARAX) 25 MG tablet Take 25 mg by mouth 3 times daily as needed for Itching      ibuprofen (ADVIL;MOTRIN) 400 MG tablet Take 400 mg by mouth every 6 hours as needed for Pain       No current facility-administered medications for this visit. No Known Allergies     OBJECTIVE:  /68   Temp 97.4 °F (36.3 °C) (Infrared)   Ht 5' (1.524 m)   Wt 222 lb (100.7 kg)   BMI 43.36 kg/m²    Physical Exam  Vitals signs reviewed. Constitutional:       Appearance: She is well-developed. HENT:      Head: Normocephalic and atraumatic. Eyes:      Conjunctiva/sclera: Conjunctivae normal.      Pupils: Pupils are equal, round, and reactive to light. Skin:     General: Skin is warm and dry. Neurological:      Mental Status: She is alert and oriented to person, place, and time. Labs:  Office Visit on 02/23/2021   Component Date Value Ref Range Status    SARS-CoV-2 02/23/2021 Not Detected  Not detected Final    Comment: This test has been authorized by FDA under an  Emergency Use Authorization (EUA). This test is only authorized for the duration of the  time of declaration that circumstances exist justifying the  authorization of the emergency use of in vitro diagnostic  testing for detection of the SARS-CoV-2 virus  and/or diagnosis of COVID-19 infection under  section 564 (b)(1) of the Act, 21 U. S.C. 809TSB-8 (b) (1),  unless the authorization is terminated or revoked sooner. Patient Fact [employer]  Provider Fact [employer]    METHODOLOGY: RT PCR         Imaging:  No results found. ASSESSMENT:  Back abscess    PLAN:  Offered options including incision and drainage today of back abscess versus observation with warm compresses and continuation of antibiotics. Risks and benefits of these were both discussed and patient is wishes to observe at this time and return to office in 1 week. Should the pain worsen she will return sooner for incision and drainage. All communication [facility] [place]     Greg Hugigns.  Cherelle Frank MD, FACS  3/17/2021  1:25 PM

## 2021-03-24 ENCOUNTER — OFFICE VISIT (OUTPATIENT)
Dept: SURGERY | Age: 66
End: 2021-03-24
Payer: COMMERCIAL

## 2021-03-24 VITALS
TEMPERATURE: 97.3 F | BODY MASS INDEX: 42.77 KG/M2 | SYSTOLIC BLOOD PRESSURE: 118 MMHG | DIASTOLIC BLOOD PRESSURE: 68 MMHG | WEIGHT: 219 LBS

## 2021-03-24 DIAGNOSIS — L72.3 INFECTED SEBACEOUS CYST: Primary | ICD-10-CM

## 2021-03-24 DIAGNOSIS — L08.9 INFECTED SEBACEOUS CYST: Primary | ICD-10-CM

## 2021-03-24 PROCEDURE — 10061 I&D ABSCESS COMP/MULTIPLE: CPT | Performed by: SURGERY

## 2021-03-24 NOTE — PROGRESS NOTES
[facility]  SUBJECTIVE:    Chief Complaint: infected sebaceous cyst on back    Leticia Marr   1955   60 y.o. female presents continued pain from an infected sebaceous cyst on her back. It does intermittently drain. It has not improved after a week of antibiotics and conservative management.   She presents today for further evaluation and likely incision and drainage    Past Medical History:   Diagnosis Date    Hypertension      Past Surgical History:   Procedure Laterality Date    BUNIONECTOMY      COLONOSCOPY N/A 9/6/2019    COLONOSCOPY performed by Mica Hubbard MD at [facility]  4/2/12    Dilatation and curetage, hysteroscopy, endometrial biopsy    DILATION AND CURETTAGE OF UTERUS  03/09/2017    HYSTEROSCOPY DILATATION AND CURETTAGE MYOSURE    HYSTEROSCOPY      HYSTEROSCOPY N/A 08/16/2018    HYSTEROSCOPY DILATATION AND CURETTAGE MYOSURE    UPPER GASTROINTESTINAL ENDOSCOPY N/A 3/1/2021    EGD BIOPSY performed by Mica Hubbard MD at [facility] History     Socioeconomic History    Marital status:      Spouse name: Not on file    Number of children: Not on file    Years of education: Not on file    Highest education level: Not on file   Occupational History    Not on file   Social Needs    Financial resource strain: Not on file    Food insecurity     Worry: Not on file     Inability: Not on file    Transportation needs     Medical: Not on file     Non-medical: Not on file   Tobacco Use    Smoking status: Never Smoker    Smokeless tobacco: Never Used   Substance and Sexual Activity    Alcohol use: No    Drug use: No     Frequency: 3.0 times per week    Sexual activity: Not on file   Lifestyle    Physical activity     Days per week: Not on file     Minutes per session: Not on file    Stress: Not on file   Relationships    Social connections     Talks on phone: Not on file     Gets together: Not on file     Attends [employer] service: Not on file     Active member of club or organization: Not on file     Attends meetings of clubs or organizations: Not on file     Relationship status: Not on file    Intimate partner violence     Fear of current or ex partner: Not on file     Emotionally abused: Not on file     Physically abused: Not on file     Forced sexual activity: Not on file   Other Topics Concern    Not on file   Social History Narrative    Not on file      Family History   Problem Relation Age of Onset    Diabetes Sister      Current Outpatient Medications   Medication Sig Dispense Refill    cephALEXin (KEFLEX) 500 MG capsule Take 500 mg by mouth 4 times daily      omeprazole (PRILOSEC) 20 MG delayed release capsule Take 20 mg by mouth daily      Skin Protectants, Misc. (EUCERIN) cream Apply topically as needed. 1 Package 0    aspirin 81 MG chewable tablet 81 mg daily      losartan (COZAAR) 50 MG tablet Take 50 mg by mouth daily      hydrOXYzine (ATARAX) 25 MG tablet Take 25 mg by mouth 3 times daily as needed for Itching      ibuprofen (ADVIL;MOTRIN) 400 MG tablet Take 400 mg by mouth every 6 hours as needed for Pain       No current facility-administered medications for this visit. No Known Allergies     Review of Systems:  Review of systems performed and negative with the exception of the above findings    OBJECTIVE:  /68   Temp 97.3 °F (36.3 °C) (Infrared)   Wt 219 lb (99.3 kg)   BMI 42.77 kg/m²      Physical Exam:  General appearance: alert, appears stated age, cooperative and no distress  Skin/wound: Back wound is tender with pinpoint opening draining purulence and surrounding induration consistent with infected sebaceous cyst that would benefit from incision and drainage    Procedure:  Using clean technique, the wound was cleaned with alcohol scrub. Local anesthetic was injected over the area of maximal induration.  A cruciate incision was made into subcutaneous tissue and a moderate amount of pus expressed. Purulence and debris was wiped away with dry gauze. Hemostasis was obtained with pressure and gauze packing. The wound was dressed with gauze and tape. The patient tolerated the procedure well without complications      Office Visit on 02/23/2021   Component Date Value Ref Range Status    SARS-CoV-2 02/23/2021 Not Detected  Not detected Final    Comment: This test has been authorized by FDA under an  Emergency Use Authorization (EUA). This test is only authorized for the duration of the  time of declaration that circumstances exist justifying the  authorization of the emergency use of in vitro diagnostic  testing for detection of the SARS-CoV-2 virus  and/or diagnosis of COVID-19 infection under  section 564 (b)(1) of the Act, 21 U. S.C. 433SZA-7 (b) (1),  unless the authorization is terminated or revoked sooner. Patient Fact [employer]  Provider Fact [employer]raiser[employer]    METHODOLOGY: RT PCR         No results found. ASSESSMENT:  Back sebaceous cyst, s/p incision and drainage    PLAN:  Local wound care with dry dressing as needed daily starting tomorrow  May shower normally starting tomorrow  Primary pain control with tylenol and ibuprofen with food  Continue current antibiotic regimen. Does not need additional or adjustment in antibiotics   If wound does not improve in the next week, return to office for a wound check. If improves, return to office as needed    Nicole Rivers.  Rita Soto MD, FACS  3/24/2021  2:45 PM

## 2021-03-24 NOTE — LETTER
[address] [facility] [address]  Phone: [phone]  Fax: [phone]    Kwasi Tijerina MD        March 24, 2021     Patient: Brandon Roa   YOB: 1955   Date of Visit: 3/24/2021       To Whom It May Concern: It is my medical opinion that Elizabeth Howard remain off work until 3/30/21 due to a minor procedure in the office today. .    If you have any questions or concerns, please don't hesitate to call.     Sincerely,        Kwasi Tijerina MD

## 2021-03-24 NOTE — PATIENT INSTRUCTIONS
Local wound care with dry dressing as needed daily starting tomorrow  May shower normally starting tomorrow  Primary pain control with tylenol and ibuprofen with food  Continue current antibiotic regimen. Does not need additional or adjustment in antibiotics   If wound does not improve in the next week, return to office for a wound check.  If improves, return to office as needed

## 2021-04-10 ENCOUNTER — IMMUNIZATION (OUTPATIENT)
Dept: PRIMARY CARE CLINIC | Age: 66
End: 2021-04-10
Payer: COMMERCIAL

## 2021-04-10 PROCEDURE — 91301 COVID-19, MODERNA VACCINE 100MCG/0.5ML DOSE: CPT | Performed by: FAMILY MEDICINE

## 2021-04-10 PROCEDURE — 0011A COVID-19, MODERNA VACCINE 100MCG/0.5ML DOSE: CPT | Performed by: FAMILY MEDICINE

## 2021-05-08 ENCOUNTER — IMMUNIZATION (OUTPATIENT)
Dept: PRIMARY CARE CLINIC | Age: 66
End: 2021-05-08
Payer: COMMERCIAL

## 2021-05-08 PROCEDURE — 91301 COVID-19, MODERNA VACCINE 100MCG/0.5ML DOSE: CPT | Performed by: FAMILY MEDICINE

## 2021-05-08 PROCEDURE — 0012A COVID-19, MODERNA VACCINE 100MCG/0.5ML DOSE: CPT | Performed by: FAMILY MEDICINE

## 2021-06-20 PROCEDURE — 99283 EMERGENCY DEPT VISIT LOW MDM: CPT

## 2021-06-21 ENCOUNTER — APPOINTMENT (OUTPATIENT)
Dept: CT IMAGING | Age: 66
DRG: 069 | End: 2021-06-21
Payer: COMMERCIAL

## 2021-06-21 ENCOUNTER — HOSPITAL ENCOUNTER (OUTPATIENT)
Age: 66
Setting detail: OBSERVATION
Discharge: HOME | DRG: 069 | End: 2021-06-23
Attending: EMERGENCY MEDICINE | Admitting: INTERNAL MEDICINE
Payer: COMMERCIAL

## 2021-06-21 ENCOUNTER — APPOINTMENT (OUTPATIENT)
Dept: MRI IMAGING | Age: 66
DRG: 069 | End: 2021-06-21
Payer: COMMERCIAL

## 2021-06-21 DIAGNOSIS — R42 DIZZINESS: Primary | ICD-10-CM

## 2021-06-21 PROBLEM — G45.9 TIA (TRANSIENT ISCHEMIC ATTACK): Status: ACTIVE | Noted: 2021-06-21

## 2021-06-21 PROBLEM — I10 ESSENTIAL HYPERTENSION: Status: ACTIVE | Noted: 2021-06-21

## 2021-06-21 PROBLEM — K21.9 GERD (GASTROESOPHAGEAL REFLUX DISEASE): Status: ACTIVE | Noted: 2021-06-21

## 2021-06-21 LAB
A/G RATIO: 1.2 (ref 1.1–2.2)
ALBUMIN SERPL-MCNC: 4.2 G/DL (ref 3.4–5)
ALP BLD-CCNC: 101 U/L (ref 40–129)
ALT SERPL-CCNC: 7 U/L (ref 10–40)
ANION GAP SERPL CALCULATED.3IONS-SCNC: 7 MMOL/L (ref 3–16)
APTT: 31.4 SEC (ref 24.2–36.2)
AST SERPL-CCNC: 18 U/L (ref 15–37)
BACTERIA: ABNORMAL /HPF
BASOPHILS ABSOLUTE: 0.1 K/UL (ref 0–0.2)
BASOPHILS RELATIVE PERCENT: 1.2 %
BILIRUB SERPL-MCNC: <0.2 MG/DL (ref 0–1)
BILIRUBIN URINE: NEGATIVE
BLOOD, URINE: NEGATIVE
BUN BLDV-MCNC: 15 MG/DL (ref 7–20)
CALCIUM SERPL-MCNC: 9.4 MG/DL (ref 8.3–10.6)
CHLORIDE BLD-SCNC: 102 MMOL/L (ref 99–110)
CHOLESTEROL, TOTAL: 171 MG/DL (ref 0–199)
CLARITY: CLEAR
CO2: 28 MMOL/L (ref 21–32)
COLOR: YELLOW
CREAT SERPL-MCNC: 0.7 MG/DL (ref 0.6–1.2)
EOSINOPHILS ABSOLUTE: 0.1 K/UL (ref 0–0.6)
EOSINOPHILS RELATIVE PERCENT: 1.9 %
EPITHELIAL CELLS, UA: 1 /HPF (ref 0–5)
ESTIMATED AVERAGE GLUCOSE: 122.6 MG/DL
GFR AFRICAN AMERICAN: >60
GFR NON-AFRICAN AMERICAN: >60
GLOBULIN: 3.4 G/DL
GLUCOSE BLD-MCNC: 105 MG/DL (ref 70–99)
GLUCOSE URINE: NEGATIVE MG/DL
HBA1C MFR BLD: 5.9 %
HCT VFR BLD CALC: 44.2 % (ref 36–48)
HDLC SERPL-MCNC: 55 MG/DL (ref 40–60)
HEMOGLOBIN: 14.4 G/DL (ref 12–16)
HYALINE CASTS: 2 /LPF (ref 0–8)
INR BLD: 0.89 (ref 0.86–1.14)
KETONES, URINE: NEGATIVE MG/DL
LDL CHOLESTEROL CALCULATED: 101 MG/DL
LEUKOCYTE ESTERASE, URINE: ABNORMAL
LYMPHOCYTES ABSOLUTE: 2.5 K/UL (ref 1–5.1)
LYMPHOCYTES RELATIVE PERCENT: 38.4 %
MCH RBC QN AUTO: 27.3 PG (ref 26–34)
MCHC RBC AUTO-ENTMCNC: 32.6 G/DL (ref 31–36)
MCV RBC AUTO: 83.7 FL (ref 80–100)
MICROSCOPIC EXAMINATION: YES
MONOCYTES ABSOLUTE: 0.6 K/UL (ref 0–1.3)
MONOCYTES RELATIVE PERCENT: 9.7 %
NEUTROPHILS ABSOLUTE: 3.1 K/UL (ref 1.7–7.7)
NEUTROPHILS RELATIVE PERCENT: 48.8 %
NITRITE, URINE: NEGATIVE
PDW BLD-RTO: 15.3 % (ref 12.4–15.4)
PH UA: 5.5 (ref 5–8)
PLATELET # BLD: 216 K/UL (ref 135–450)
PMV BLD AUTO: 9.3 FL (ref 5–10.5)
POTASSIUM REFLEX MAGNESIUM: 4.7 MMOL/L (ref 3.5–5.1)
PROTEIN UA: NEGATIVE MG/DL
PROTHROMBIN TIME: 10.3 SEC (ref 10–13.2)
RBC # BLD: 5.29 M/UL (ref 4–5.2)
RBC UA: 1 /HPF (ref 0–4)
SODIUM BLD-SCNC: 137 MMOL/L (ref 136–145)
SPECIFIC GRAVITY UA: 1.02 (ref 1–1.03)
TOTAL PROTEIN: 7.6 G/DL (ref 6.4–8.2)
TRIGL SERPL-MCNC: 76 MG/DL (ref 0–150)
TROPONIN: <0.01 NG/ML
URINE REFLEX TO CULTURE: ABNORMAL
URINE TYPE: ABNORMAL
UROBILINOGEN, URINE: 0.2 E.U./DL
VLDLC SERPL CALC-MCNC: 15 MG/DL
WBC # BLD: 6.4 K/UL (ref 4–11)
WBC UA: 3 /HPF (ref 0–5)

## 2021-06-21 PROCEDURE — 70450 CT HEAD/BRAIN W/O DYE: CPT

## 2021-06-21 PROCEDURE — 2580000003 HC RX 258: Performed by: INTERNAL MEDICINE

## 2021-06-21 PROCEDURE — 93005 ELECTROCARDIOGRAM TRACING: CPT | Performed by: PHYSICIAN ASSISTANT

## 2021-06-21 PROCEDURE — 85610 PROTHROMBIN TIME: CPT

## 2021-06-21 PROCEDURE — 97161 PT EVAL LOW COMPLEX 20 MIN: CPT

## 2021-06-21 PROCEDURE — 96372 THER/PROPH/DIAG INJ SC/IM: CPT

## 2021-06-21 PROCEDURE — 6360000004 HC RX CONTRAST MEDICATION: Performed by: PHYSICIAN ASSISTANT

## 2021-06-21 PROCEDURE — 92526 ORAL FUNCTION THERAPY: CPT

## 2021-06-21 PROCEDURE — G0378 HOSPITAL OBSERVATION PER HR: HCPCS

## 2021-06-21 PROCEDURE — 85025 COMPLETE CBC W/AUTO DIFF WBC: CPT

## 2021-06-21 PROCEDURE — 83036 HEMOGLOBIN GLYCOSYLATED A1C: CPT

## 2021-06-21 PROCEDURE — 81001 URINALYSIS AUTO W/SCOPE: CPT

## 2021-06-21 PROCEDURE — 6370000000 HC RX 637 (ALT 250 FOR IP): Performed by: INTERNAL MEDICINE

## 2021-06-21 PROCEDURE — 70496 CT ANGIOGRAPHY HEAD: CPT

## 2021-06-21 PROCEDURE — 70551 MRI BRAIN STEM W/O DYE: CPT

## 2021-06-21 PROCEDURE — 6360000002 HC RX W HCPCS: Performed by: INTERNAL MEDICINE

## 2021-06-21 PROCEDURE — 92610 EVALUATE SWALLOWING FUNCTION: CPT

## 2021-06-21 PROCEDURE — 80053 COMPREHEN METABOLIC PANEL: CPT

## 2021-06-21 PROCEDURE — 85730 THROMBOPLASTIN TIME PARTIAL: CPT

## 2021-06-21 PROCEDURE — 84484 ASSAY OF TROPONIN QUANT: CPT

## 2021-06-21 PROCEDURE — 80061 LIPID PANEL: CPT

## 2021-06-21 RX ORDER — POLYETHYLENE GLYCOL 3350 17 G/17G
17 POWDER, FOR SOLUTION ORAL DAILY PRN
Status: DISCONTINUED | OUTPATIENT
Start: 2021-06-21 | End: 2021-06-23 | Stop reason: HOSPADM

## 2021-06-21 RX ORDER — HYDROXYZINE HYDROCHLORIDE 25 MG/1
25 TABLET, FILM COATED ORAL 3 TIMES DAILY PRN
Status: DISCONTINUED | OUTPATIENT
Start: 2021-06-21 | End: 2021-06-23 | Stop reason: HOSPADM

## 2021-06-21 RX ORDER — ASPIRIN 300 MG/1
300 SUPPOSITORY RECTAL DAILY
Status: DISCONTINUED | OUTPATIENT
Start: 2021-06-21 | End: 2021-06-23 | Stop reason: HOSPADM

## 2021-06-21 RX ORDER — PANTOPRAZOLE SODIUM 40 MG/1
40 TABLET, DELAYED RELEASE ORAL
Status: DISCONTINUED | OUTPATIENT
Start: 2021-06-21 | End: 2021-06-23 | Stop reason: HOSPADM

## 2021-06-21 RX ORDER — SODIUM CHLORIDE 0.9 % (FLUSH) 0.9 %
5-40 SYRINGE (ML) INJECTION PRN
Status: DISCONTINUED | OUTPATIENT
Start: 2021-06-21 | End: 2021-06-23 | Stop reason: HOSPADM

## 2021-06-21 RX ORDER — SODIUM CHLORIDE 0.9 % (FLUSH) 0.9 %
5-40 SYRINGE (ML) INJECTION EVERY 12 HOURS SCHEDULED
Status: DISCONTINUED | OUTPATIENT
Start: 2021-06-21 | End: 2021-06-23 | Stop reason: HOSPADM

## 2021-06-21 RX ORDER — ONDANSETRON 4 MG/1
4 TABLET, ORALLY DISINTEGRATING ORAL EVERY 8 HOURS PRN
Status: DISCONTINUED | OUTPATIENT
Start: 2021-06-21 | End: 2021-06-23 | Stop reason: HOSPADM

## 2021-06-21 RX ORDER — SODIUM CHLORIDE 9 MG/ML
25 INJECTION, SOLUTION INTRAVENOUS PRN
Status: DISCONTINUED | OUTPATIENT
Start: 2021-06-21 | End: 2021-06-23 | Stop reason: HOSPADM

## 2021-06-21 RX ORDER — LOSARTAN POTASSIUM 25 MG/1
50 TABLET ORAL DAILY
Status: DISCONTINUED | OUTPATIENT
Start: 2021-06-22 | End: 2021-06-22

## 2021-06-21 RX ORDER — LABETALOL HYDROCHLORIDE 5 MG/ML
10 INJECTION, SOLUTION INTRAVENOUS EVERY 10 MIN PRN
Status: DISCONTINUED | OUTPATIENT
Start: 2021-06-21 | End: 2021-06-23 | Stop reason: HOSPADM

## 2021-06-21 RX ORDER — ATORVASTATIN CALCIUM 80 MG/1
80 TABLET, FILM COATED ORAL NIGHTLY
Status: DISCONTINUED | OUTPATIENT
Start: 2021-06-21 | End: 2021-06-23 | Stop reason: HOSPADM

## 2021-06-21 RX ORDER — ONDANSETRON 2 MG/ML
4 INJECTION INTRAMUSCULAR; INTRAVENOUS EVERY 6 HOURS PRN
Status: DISCONTINUED | OUTPATIENT
Start: 2021-06-21 | End: 2021-06-23 | Stop reason: HOSPADM

## 2021-06-21 RX ORDER — ASPIRIN 81 MG/1
81 TABLET ORAL DAILY
Status: DISCONTINUED | OUTPATIENT
Start: 2021-06-21 | End: 2021-06-23 | Stop reason: HOSPADM

## 2021-06-21 RX ADMIN — ENOXAPARIN SODIUM 40 MG: 40 INJECTION SUBCUTANEOUS at 13:51

## 2021-06-21 RX ADMIN — PANTOPRAZOLE SODIUM 40 MG: 40 TABLET, DELAYED RELEASE ORAL at 13:52

## 2021-06-21 RX ADMIN — ATORVASTATIN CALCIUM 80 MG: 80 TABLET, FILM COATED ORAL at 23:17

## 2021-06-21 RX ADMIN — Medication 10 ML: at 13:53

## 2021-06-21 RX ADMIN — Medication 10 ML: at 23:17

## 2021-06-21 RX ADMIN — IOPAMIDOL 75 ML: 755 INJECTION, SOLUTION INTRAVENOUS at 04:12

## 2021-06-21 RX ADMIN — ASPIRIN 81 MG: 81 TABLET, COATED ORAL at 13:51

## 2021-06-21 RX ADMIN — HYDROXYZINE HYDROCHLORIDE 25 MG: 25 TABLET, FILM COATED ORAL at 13:51

## 2021-06-21 ASSESSMENT — PAIN SCALES - GENERAL
PAINLEVEL_OUTOF10: 0
PAINLEVEL_OUTOF10: 6

## 2021-06-21 ASSESSMENT — ENCOUNTER SYMPTOMS
NAUSEA: 0
ABDOMINAL PAIN: 0
VOMITING: 0
SHORTNESS OF BREATH: 0

## 2021-06-21 NOTE — PROGRESS NOTES
Facility/Department: [address]  Initial Assessment  DYSPHAGIA BEDSIDE SWALLOW EVALUATION     Patient: Conrado Del Valle   : 1955   MRN: [id number]      Evaluation Date: 2021   Admitting Diagnosis: TIA (transient ischemic attack) [G45.9]  Pain: Denied                          H&P:   Conrado Del Valle is 60 y.o. female who presented with complaint of dizziness. Symptom onset was acute for a time period of 1 day. The severity is described as moderate. The course of his symptoms over time is persistent. The symptoms improved with rest and worsened with activity. The patient's symptom is associated with left facial droop.     Leticia Finney is 60 y.o. female with history of hypertension  She presents to the ER with complaint of dizziness for more than 12 hours  She had a fall at home which was low impact and without any trauma  In addition she had paresthesia to the left side of her face  She is concerned that she may have a mini stroke  She denies any weakness in the arms or legs but she has a slight left facial droop which is new to her. Patient denies any slurred speech. MRI brain without contrast:   - ordered, not yet completed     Recent Head CT: 21  No acute intracranial abnormality. History/Prior Level of Function:   Living Status:  Pt lives at home and is still working full time. She is Nepali speaking. Prior Dysphagia History:  No prior dysphagia     Dysphagia Impressions/Diagnosis:   Pt presents with grossly intact swallow function. Oral motor assessment is WFL. Pt accepted regular-soft solids and thin liquid consistencies. She tolerated all presentations without overt s/s of aspiration. Timing and strength of laryngeal elevation was functional. Mastication was appropriate and adequate for oral clearance. No aspiration risks for evident at this time. There is no further indication for dysphagia intervention.    SLP will keep pt on follow up list and complete further speech/language/evaluation pending MRI results. Pt denied any speech/language/cognitive changes; however, if CVA is confirmed, pt may benefit from further assessment to ensure safe return to prior level of independence. Recommended Diet and Intervention 6/21/2021:  Diet Solids Recommendation:  Regular solids  Liquid Consistency Recommendation: Thin liquids  Recommended form of Meds:  Whole with water as tolerated      Compensatory Swallowing Strategies:  N/a     SHORT TERM DYSPHAGIA GOALS/PLAN OF CARE:   No dysphagia goals are warranted at this time. Plan of care will be determined pending MRI results. If CVA is confirmed, pt may benefit from further assessment to ensure safe return to prior level of independence. Dysphagia Therapeutic Intervention:  N/a     Discharge Recommendations:   Do not suspect pt will require SLP treatment upon d/c from acute care setting.      Patient Positioning: Upright in bed    Current Diet Level (prior to evaluation):    Respiratory Status:   [x]Room Air   []O2 via nasal cannula   []Other:    Dentition:  [x]Adequate  []Dentures   []Missing Many Teeth  []Edentulous  []Other:    Baseline Vocal Quality:  [x]Normal  []Dysphonic   []Aphonic   []Hoarse  []Wet  []Weak  []Other:    Volitional Cough:  [x]Strong  []Weak  []Wet  []Absent  []Congested  []Other:    Volitional Swallow:   []Absent   []Delayed     [x]Adequate     []Required use of drink     Oral Mechanism Exam:  [x]WFL []Mild   [] Moderate  []Severe  []To be assessed  Impaired:   []Left side      []Right side    []Labial ROM/Coordination    []Labial Symmetry   []Lingual ROM/Coordination   []Lingual Symmetry  []Gag  []Other:     Oral Phase: [x]WFL []Mild   [] Moderate  []Severe  []To be assessed   []Impaired/Prolonged Mastication:   []Spillage Left:   []Spillage Right:  []Pocketing Left:   []Pocketing Right:   []Decreased Anterior to Posterior Transit:   []Suspected Premature Bolus Loss:   []Lingual/Palatal

## 2021-06-21 NOTE — ED NOTES
Bedside handoff given to 5 RN. NIH done at bedside using . Patient being transport to unit with all belongings via wheelchair and RN at this time.       [facility]  06/21/21 [id number]

## 2021-06-21 NOTE — H&P
Hospital Medicine History & Physical      PCP: Referring Not In System (Inactive)    Date of Admission: 6/21/2021    Date of Service: Pt seen/examined on 06/21/21 and placed in Observation. Chief Complaint:  Dizziness       History Of Present Illness:  [place] Acosta is 60 y.o. female who presented with complaint of dizziness. Symptom onset was acute for a time period of 1 day. The severity is described as moderate. The course of his symptoms over time is persistent. The symptoms improved with rest and worsened with activity. The patient's symptom is associated with left facial droop. [place] Acosta is 60 y.o. female with history of hypertension  She presents to the ER with complaint of dizziness for more than 12 hours  She had a fall at home which was low impact and without any trauma  In addition she had paresthesia to the left side of her face  She is concerned that she may have a mini stroke  She denies any weakness in the arms or legs but she has a slight left facial droop which is new to her. Patient denies any slurred speech. Past Medical History:          Diagnosis Date    Hypertension        Past Surgical History:          Procedure Laterality Date    BUNIONECTOMY      COLONOSCOPY N/A 9/6/2019    COLONOSCOPY performed by Lorena Torres MD at [facility]  4/2/12    Dilatation and curetage, hysteroscopy, endometrial biopsy    DILATION AND CURETTAGE OF UTERUS  03/09/2017    HYSTEROSCOPY DILATATION AND CURETTAGE MYOSURE    HYSTEROSCOPY      HYSTEROSCOPY N/A 08/16/2018    HYSTEROSCOPY DILATATION AND CURETTAGE MYOSURE    UPPER GASTROINTESTINAL ENDOSCOPY N/A 3/1/2021    EGD BIOPSY performed by Lorena Torres MD at [address] [place] [address]       Medications Prior to Admission:      Prior to Admission medications    Medication Sig Start Date End Date Taking?  Authorizing Provider   cephALEXin (KEFLEX) 500 MG capsule Take 500 mg by mouth 4 times daily Historical Provider, MD   omeprazole (PRILOSEC) 20 MG delayed release capsule Take 20 mg by mouth daily    Historical Provider, MD   Skin Protectants, Misc. (EUCERIN) cream Apply topically as needed. 1/22/21   DOUGLAS Ng   aspirin 81 MG chewable tablet 81 mg daily    Historical Provider, MD   losartan (COZAAR) 50 MG tablet Take 50 mg by mouth daily    Historical Provider, MD   hydrOXYzine (ATARAX) 25 MG tablet Take 25 mg by mouth 3 times daily as needed for Itching    Historical Provider, MD   ibuprofen (ADVIL;MOTRIN) 400 MG tablet Take 400 mg by mouth every 6 hours as needed for Pain    Historical Provider, MD       Allergies:  Patient has no known allergies. Social History:      The patient currently lives at home    TOBACCO:   reports that she has never smoked. She has never used smokeless tobacco.  ETOH:   reports no history of alcohol use. E-Cigarettes/Vaping Use     Questions Responses    E-Cigarette/Vaping Use Never User    Start Date     Passive Exposure     Quit Date     Counseling Given     Comments             Family History:      Reviewed in detail and negative for CAD, Cancer, CVA. Positive as follows:        Problem Relation Age of Onset    Diabetes Sister        REVIEW OF SYSTEMS COMPLETED:   Pertinent positives as noted in the HPI. All other systems reviewed and negative. PHYSICAL EXAM PERFORMED:    BP (!) 111/90   Pulse 57   Temp 97.1 °F (36.2 °C) (Oral)   Resp 16   Ht 5' (1.524 m)   Wt 219 lb (99.3 kg)   SpO2 95%   BMI 42.77 kg/m²     General appearance:  No apparent distress, appears stated age and cooperative. HEENT:  Normal cephalic, atraumatic without obvious deformity. Pupils equal, round, and reactive to light. Extra ocular muscles intact. Conjunctivae/corneas clear. Neck: Supple, with full range of motion. No jugular venous distention. Trachea midline. Respiratory:  Normal respiratory effort.  Clear to auscultation, bilaterally without Rales/Wheezes/Rhonchi. Cardiovascular:  Regular rate and rhythm with normal S1/S2 without murmurs, rubs or gallops. Abdomen: Soft, non-tender, non-distended with normal bowel sounds. Musculoskeletal:  No clubbing, cyanosis or edema bilaterally. Full range of motion without deformity. Skin: Skin color, texture, turgor normal.  No rashes or lesions. Neurologic:  Neurovascularly intact without any focal sensory/motor deficits. Cranial nerves: II-XII intact, grossly non-focal.  Psychiatric:  Alert and oriented, thought content appropriate, normal insight  Capillary Refill: Brisk,3 seconds, normal  Peripheral Pulses: +2 palpable, equal bilaterally       Labs:     Recent Labs     06/21/21 0319   WBC 6.4   HGB 14.4   HCT 44.2        Recent Labs     06/21/21 0319      K 4.7      CO2 28   BUN 15   CREATININE 0.7   CALCIUM 9.4     Recent Labs     06/21/21 0319   AST 18   ALT 7*   BILITOT <0.2   ALKPHOS 101     Recent Labs     06/21/21 0319   INR 0.89     Recent Labs     06/21/21 0319   TROPONINI <0.01       Urinalysis:      Lab Results   Component Value Date    NITRU Negative 06/21/2021    WBCUA 3 06/21/2021    BACTERIA 1+ 06/21/2021    RBCUA 1 06/21/2021    BLOODU Negative 06/21/2021    SPECGRAV 1.016 06/21/2021    GLUCOSEU Negative 06/21/2021       Radiology:     CT head: I have reviewed the CT images with the following interpretation: No acute intracranial abnormality    EKG:  I have reviewed the EKG with the following interpretation: Normal sinus rhythm      ASSESSMENT:    1. Transient ischemic attack  2. Dizziness  3. Essential hypertension  4. GERD       PLAN:    1. Admit to telemetry unit  2. Hold lisinopril this morning to allow permissive hypertension in light of suspected acute stroke  3. CTA head and neck and MRI of the brain is ordered  4. Started on ASA and statin  5. Lipid panel and hemoglobin A1c in am  6. NPO until dysphagia evaluation   7.  SLP / OT / PT consulted       DVT Prophylaxis: Enoxaparin  Diet: Regular diet  Code Status: Full code    PT/OT Eval Status: Ordered    Dispo - Admit as observation       Anabella Haynes MD    Thank you Referring Not In System (Inactive) for the opportunity to be involved in this patient's care. If you have any questions or concerns please feel free to contact me at [phone].

## 2021-06-21 NOTE — PLAN OF CARE
Problem: Pain:  Goal: Control of acute pain  Description: Control of acute pain  Outcome: Ongoing   Patient has not complaints of pain.

## 2021-06-21 NOTE — PROGRESS NOTES
ambulates with good step length, appropriate shawn, no LOB. Distance: ~40 ft  Stairs/Curb  Stairs?: No     Balance  Posture: Good  Sitting - Static: Good  Sitting - Dynamic: Good  Standing - Static: Good  Standing - Dynamic: Good        Plan   Plan  Times per week: D/C  Safety Devices  Type of devices:  All fall risk precautions in place, Call light within reach, Nurse notified, Left in bed, Gait belt  Restraints  Initially in place: No    G-Code       OutComes Score                                                  AM-PAC Score  AM-PAC Inpatient Mobility Raw Score : 24 (06/21/21 1633)  AM-PAC Inpatient T-Scale Score : 61.14 (06/21/21 1633)  Mobility Inpatient CMS 0-100% Score: 0 (06/21/21 1633)  Mobility Inpatient CMS G-Code Modifier : [facility] (06/21/21 1633)          Goals  Short term goals  Time Frame for Short term goals: None       Therapy Time   Individual Concurrent Group Co-treatment   Time In 1540         Time Out 1600         Minutes 20         Timed Code Treatment Minutes: 5 Minutes     Leanor Primus, PT   Krishnaor Primus, PT, DPT, [id number]

## 2021-06-21 NOTE — LETTER
[facility]  [address] [address]  Phone: [phone]             June 23, 2021    Patient: Garcia Malik   YOB: 1955   Date of Visit: 6/21/2021       To Whom It May Concern:    Jesús Landaverde was seen and treated in our facility beginning 6/21/2021 until   6/23/2021. She may return to work 6/24/2021.       Sincerely,       Lashanda Potter RN         Signature:__________________________________

## 2021-06-21 NOTE — ED PROVIDER NOTES
I personally evaluated and examined the patient in conjunction with the APC and agree with the assessment, treatment plan and disposition of the patient has recorded by the APC. I reviewed pertinent nurse's notes, triage notes, vital signs, past medical history, family and social history, medications, and allergies. Complete review of systems was conducted by the mid-level provider and/or myself. Review of systems is negative except as documented in the history of present illness. This patient came in with dizziness and left-sided facial paresthesia. Initial CT scan of her head is negative CTA has been pending. Patient does not have any focal neurologic deficit on examination but because of her age, I think is best she be admitted for further evaluation and MRI    EKG: Normal sinus rhythm with no ST elevation or depression    FINAL IMPRESSION     1. Dizziness            Electronically signed by: Roena Oppenheim Fredderick Pintos, M.D. Shoshana Dama, MD  06/21/21 1936

## 2021-06-21 NOTE — ED PROVIDER NOTES
[address] [facility]        Pt Name: Lili Barnett  MRN: [id number]  [place] 1955  Date of evaluation: 6/20/2021  Provider: Royce Hicks  PCP: Referring Not In System (Inactive)  Note Started: 4:36 AM EDT        I have seen and evaluated this patient with my supervising physician Anel Chowdhury MD.    [address] [facility]       Chief Complaint   Patient presents with    Dizziness     From home. Feels like left side of face is altered since around 1300 with dizziness. Laid down for awhile but hasn't resolved. HISTORY OF PRESENT ILLNESS   (Location, Timing/Onset, Context/Setting, Quality, Duration, Modifying Factors, Severity, Associated Signs and Symptoms)  Note limiting factors. Lili Barnett is a 60 y.o. female who presents with a Chief Complaint of patient presents emergency department for evaluation of dizziness with pins-and-needles paresthesia to the left side of her face. Patient states this started around 1 PM.  She states this is never happened before. Denies any headache. Denies any difficulty with speech or swallowing. Denies feeling confused. Denies any chest pain, shortness of breath, recent viral infection, abdominal pain, nausea vomiting or diarrhea. Nursing Notes were all reviewed and agreed with or any disagreements were addressed in the HPI. REVIEW OF SYSTEMS    (2-9 systems for level 4, 10 or more for level 5)     Review of Systems   Constitutional: Negative for fatigue and fever. HENT: Negative. Eyes: Negative for visual disturbance. Respiratory: Negative for shortness of breath. Cardiovascular: Negative for chest pain. Gastrointestinal: Negative for abdominal pain, nausea and vomiting. Genitourinary: Negative. Musculoskeletal: Negative. Neurological: Positive for dizziness and numbness. Positives and Pertinent negatives as per HPI.  Except as noted above in the ROS, all other systems were reviewed and negative. PAST MEDICAL HISTORY     Past Medical History:   Diagnosis Date    Hypertension          SURGICAL HISTORY     Past Surgical History:   Procedure Laterality Date    BUNIONECTOMY      COLONOSCOPY N/A 9/6/2019    COLONOSCOPY performed by Germania Gutierrez MD at [facility]  4/2/12    Dilatation and curetage, hysteroscopy, endometrial biopsy    DILATION AND CURETTAGE OF UTERUS  03/09/2017    HYSTEROSCOPY DILATATION AND CURETTAGE MYOSURE    HYSTEROSCOPY      HYSTEROSCOPY N/A 08/16/2018    HYSTEROSCOPY DILATATION AND CURETTAGE MYOSURE    UPPER GASTROINTESTINAL ENDOSCOPY N/A 3/1/2021    EGD BIOPSY performed by Germania Gutierrez MD at [address]       Current Discharge Medication List      CONTINUE these medications which have NOT CHANGED    Details   aspirin 81 MG chewable tablet 81 mg daily      losartan (COZAAR) 50 MG tablet Take 50 mg by mouth daily      ibuprofen (ADVIL;MOTRIN) 400 MG tablet Take 400 mg by mouth every 6 hours as needed for Pain      cephALEXin (KEFLEX) 500 MG capsule Take 500 mg by mouth 4 times daily      omeprazole (PRILOSEC) 20 MG delayed release capsule Take 20 mg by mouth daily      Skin Protectants, Misc. (EUCERIN) cream Apply topically as needed. Qty: 1 Package, Refills: 0      hydrOXYzine (ATARAX) 25 MG tablet Take 25 mg by mouth 3 times daily as needed for Itching               ALLERGIES     Patient has no known allergies. FAMILYHISTORY       Family History   Problem Relation Age of Onset    Diabetes Sister           SOCIAL HISTORY       Social History     Tobacco Use    Smoking status: Never Smoker    Smokeless tobacco: Never Used   Vaping Use    Vaping Use: Never used   Substance Use Topics    Alcohol use: No    Drug use: No     Frequency: 3.0 times per week       SCREENINGS   NIH Stroke Scale  Interval: Baseline  Level of Consciousness (1a. ): Alert  LOC Questions (1b. ):  Answers both correctly  LOC Commands (1c. ): Performs both tasks correctly  Best Gaze (2. ): Normal  Visual (3. ): No visual loss  Facial Palsy (4. ): Normal symmetrical movement (complaints of left side numbness)  Motor Arm, Left (5a. ): No drift  Motor Arm, Right (5b. ): No drift  Motor Leg, Left (6a. ): No drift  Motor Leg, Right (6b. ): No drift  Limb Ataxia (7. ): Absent  Sensory (8. ): (!) Mild to Moderate  Best Language (9. ): No aphasia  Dysarthria (10. ): Normal  Extinction and Inattention (11): No abnormality  Total: 1Glasgow Coma Scale  Eye Opening: Spontaneous  Best Verbal Response: Oriented  Best Motor Response: Obeys commands  Geraldine Coma Scale Score: 15        PHYSICAL EXAM    (up to 7 for level 4, 8 or more for level 5)     ED Triage Vitals [06/20/21 2352]   BP Temp Temp Source Pulse Resp SpO2 Height Weight   (!) 155/81 97.1 °F (36.2 °C) Oral 84 16 97 % 5' (1.524 m) 219 lb (99.3 kg)       Physical Exam  Vitals and nursing note reviewed. Constitutional:       General: She is not in acute distress. Appearance: Normal appearance. She is well-developed. She is not ill-appearing, toxic-appearing or diaphoretic. HENT:      Head: Normocephalic and atraumatic. Right Ear: Tympanic membrane, ear canal and external ear normal.      Left Ear: Tympanic membrane, ear canal and external ear normal.      Nose: Nose normal.   Eyes:      General:         Right eye: No discharge. Left eye: No discharge. Cardiovascular:      Rate and Rhythm: Normal rate and regular rhythm. Heart sounds: Normal heart sounds. No murmur heard. No gallop. Pulmonary:      Effort: Pulmonary effort is normal. No respiratory distress. Breath sounds: Normal breath sounds. No wheezing, rhonchi or rales. Abdominal:      General: Bowel sounds are normal.      Tenderness: There is no abdominal tenderness. There is no guarding or rebound. Musculoskeletal:         General: Normal range of motion.       Cervical back: limits    Narrative:     Performed at:  [facility]  [address]  [place], [address] [place]   Phone [phone]   LIPID PANEL - Abnormal; Notable for the following components:    LDL Calculated 101 (*)     All other components within normal limits    Narrative:     Performed at:  [facility]  [address] [facility] [address]   Phone [phone]   C DIFF TOXIN/ANTIGEN   PROTIME-INR    Narrative:     Performed at:  [facility]  [address]  [place], [address]   Phone [phone]   APTT    Narrative:     Performed at:  [facility] [address], [place], [address]   Phone [phone]   TROPONIN    Narrative:     Performed at:  [facility]  [address] [facility], [address]   Phone [phone]   HEMOGLOBIN A1C    Narrative:     Performed at:  [facility]  [address] [facility] [address]   Phone [phone]       All other labs were within normal range or not returned as of this dictation. EKG: All EKG's are interpreted by the Emergency Department Physician in the absence of a cardiologist.  Please see their note for interpretation of EKG. RADIOLOGY:   Non-plain film images such as CT, Ultrasound and MRI are read by the radiologist. Plain radiographic images are visualized and preliminarily interpreted by the ED Provider with the below findings:        Interpretation per the Radiologist below, if available at the time of this note:    MRI brain without contrast   Final Result   No acute infarct. CTA HEAD NECK W CONTRAST   Final Result   No acute abnormality or flow-limiting stenosis of the major arteries of the   head and neck. Beading of the bilateral internal carotid arteries, likely related to mild   fibromuscular dysplasia.          CT Head WO Contrast Final Result   No acute intracranial abnormality. CT Head WO Contrast    Result Date: 6/21/2021  EXAMINATION: CT OF THE HEAD WITHOUT CONTRAST  6/21/2021 3:24 am TECHNIQUE: CT of the head was performed without the administration of intravenous contrast. Dose modulation, iterative reconstruction, and/or weight based adjustment of the mA/kV was utilized to reduce the radiation dose to as low as reasonably achievable. COMPARISON: 08/06/2020 HISTORY: ORDERING SYSTEM PROVIDED HISTORY: dizziness, left sided facial pins and needles, started around 1300 TECHNOLOGIST PROVIDED HISTORY: Reason for exam:->dizziness, left sided facial pins and needles, started around 1300 Has a \"code stroke\" or \"stroke alert\" been called? ->No Decision Support Exception - unselect if not a suspected or confirmed emergency medical condition->Emergency Medical Condition (MA) Reason for Exam: dizziness, left sided facial pins and needles, started around 1300 Acuity: Acute Type of Exam: Initial FINDINGS: BRAIN/VENTRICLES: There is no acute intracranial hemorrhage, mass effect or midline shift. No abnormal extra-axial fluid collection. The gray-white differentiation is maintained without evidence of an acute infarct. There is no evidence of hydrocephalus. ORBITS: The visualized portion of the orbits demonstrate no acute abnormality. SINUSES: The visualized paranasal sinuses and mastoid air cells demonstrate no acute abnormality. SOFT TISSUES/SKULL:  No acute abnormality of the visualized skull or soft tissues. No acute intracranial abnormality.            PROCEDURES   Unless otherwise noted below, none     Procedures    CRITICAL CARE TIME   N/A    CONSULTS:  IP CONSULT TO SPIRITUAL SERVICES  IP CONSULT TO NEUROLOGY      EMERGENCY DEPARTMENT COURSE and DIFFERENTIAL DIAGNOSIS/MDM:   Vitals:    Vitals:    06/21/21 1000 06/21/21 1234 06/21/21 1645 06/21/21 1715   BP:  (!) 157/83  126/67   Pulse:  69 59 90   Resp:  18  11   Temp:  98.7 °F (37.1 °C)  97.5 °F (36.4 °C)   TempSrc:  Temporal  Temporal   SpO2:  98%  98%   Weight: 214 lb 1.1 oz (97.1 kg)      Height: 5' (1.524 m)          Patient was given the following medications:  Medications   sodium chloride flush 0.9 % injection 5-40 mL (10 mLs Intravenous Given 6/21/21 1353)   sodium chloride flush 0.9 % injection 5-40 mL (has no administration in time range)   0.9 % sodium chloride infusion (has no administration in time range)   ondansetron (ZOFRAN-ODT) disintegrating tablet 4 mg (has no administration in time range)     Or   ondansetron (ZOFRAN) injection 4 mg (has no administration in time range)   polyethylene glycol (GLYCOLAX) packet 17 g (has no administration in time range)   enoxaparin (LOVENOX) injection 40 mg (40 mg Subcutaneous Given 6/21/21 1351)   aspirin EC tablet 81 mg (81 mg Oral Given 6/21/21 1351)     Or   aspirin suppository 300 mg ( Rectal See Alternative 6/21/21 1351)   atorvastatin (LIPITOR) tablet 80 mg (has no administration in time range)   labetalol (NORMODYNE;TRANDATE) injection 10 mg (has no administration in time range)   hydrOXYzine (ATARAX) tablet 25 mg (25 mg Oral Given 6/21/21 1351)   losartan (COZAAR) tablet 50 mg ( Oral Automatically Held 6/25/21 0900)   pantoprazole (PROTONIX) tablet 40 mg (40 mg Oral Given 6/21/21 1352)   iopamidol (ISOVUE-370) 76 % injection 75 mL (75 mLs Intravenous Given 6/21/21 0412)           Patient presents emergency department for dizziness and pins-and-needles paresthesia to left-sided face. On exam, the patient appears well-hydrated, well-nourished, and in no acute distress. Mucous membranes are moist. Breathing is unlabored. Skin is dry. Mental status is normal. Alert and oriented to person, place, time, and situation. CN II-XII intact. No drift in the bilateral upper or lower extremities. Strength normal and equal in the bilateral upper and lower extremities.  Normal sensation to light touch throughout the bilateral upper extremities, and lower extremities. Patient reports pins and needle sensation to the left cheek only. No facial droop and able to lift eyebrows bilaterally. No truncal ataxia. Speech normal without aphasia or dysarthria. No neglect or gaze deviation. Negative test of skew. No visual field deficits as tested by confrontation in each eye individually. Patient has dizziness that is elicited with position change. Negative romberg. TMs appear normal bilaterally. No nystagmus noted. Patient does not have any debilitating symptoms at this time. Patient symptoms have been ongoing for greater than 12 hours. Patient does not have any dysarthria or dysphagia. Laboratory evaluation and CT scans are initiated. This is the end of my shift, care will be turned over to Dr. Cuong Stanley. Please see his note for final disposition and care. FINAL IMPRESSION      1. Dizziness          DISPOSITION/PLAN   DISPOSITION Admitted 06/21/2021 06:44:02 AM      PATIENT REFERRED TO:  No follow-up provider specified.     DISCHARGE MEDICATIONS:  Current Discharge Medication List          DISCONTINUED MEDICATIONS:  Current Discharge Medication List                 (Please note that portions of this note were completed with a voice recognition program.  Efforts were made to edit the dictations but occasionally words are mis-transcribed.)    Lennis Romberg, PA-C (electronically signed)            Lennis Romberg, PA-C  06/21/21 2008

## 2021-06-21 NOTE — PROGRESS NOTES
Occupational Therapy   Occupational Therapy Initial Assessment/Discharge Summary  Date: 2021   Patient Name: Aster Desai  MRN: [id number]     : 1955    Date of Service: 2021    Discharge Recommendations: Aster Desai scored a 24/24 on the [facility] ADL Inpatient form. At this time, no further OT is recommended upon discharge due to patient near baseline level of function. Recommend patient returns to prior setting with prior services. OT Equipment Recommendations  Equipment Needed: No    Assessment   Assessment: Pt presents near baseline, no OT indicated at this time  Decision Making: Low Complexity  Exam: as above  OT Education: OT Role  Patient Education: eval, discharge - patient v/u  REQUIRES OT FOLLOW UP: No  Activity Tolerance  Activity Tolerance: Patient Tolerated treatment well  Safety Devices  Safety Devices in place: Yes  Type of devices: All fall risk precautions in place; Left in bed;Nurse notified;Gait belt;Call light within reach (mod fall risk)           Patient Diagnosis(es): The encounter diagnosis was Dizziness. has a past medical history of Hypertension. has a past surgical history that includes Bunionectomy; Dilation and curettage of uterus (12); Dilation and curettage of uterus (2017); hysteroscopy; hysteroscopy (N/A, 2018); Colonoscopy (N/A, 2019); and Upper gastrointestinal endoscopy (N/A, 3/1/2021). Restrictions  Restrictions/Precautions  Restrictions/Precautions: Fall Risk (low)  Position Activity Restriction  Other position/activity restrictions: 60 y.o. female who presented with complaint of dizziness. Symptom onset was acute for a time period of 1 day. The severity is described as moderate. The course of his symptoms over time is persistent. The symptoms improved with rest and worsened with activity. The patient's symptom is associated with left facial droop.  MRI (-)    Subjective   General  Chart Reviewed: Yes  Diagnosis: TIA  Subjective  Subjective: Pt supine in bed upon arrival, pleasant and agreeable to evaluation. Denies pain  Patient Currently in Pain: Denies  Pre Treatment Pain Screening  Intervention List: Patient able to continue with treatment;Nurse/Physician notified  Vital Signs  Patient Currently in Pain: Denies     Social/Functional History  Social/Functional History  Lives With: Alone  Type of Home: Apartment  Home Layout: Bed/Bath upstairs  Home Access: Stairs to enter with rails  Entrance Stairs - Number of Steps: Rail on 2nd floor, no rail on 1st & 3rd floors -- reports 6 flights of steps  Bathroom Shower/Tub: Walk-in shower  Bathroom Toilet: Standard  ADL Assistance: Independent  Homemaking Assistance: Independent  Ambulation Assistance: Independent  Transfer Assistance: Independent  Occupation: Full time employment  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[place] [address]  Additional Comments: 1 fall leading to this admission, no additional falls       Objective   Vision: Impaired  Vision Exceptions: Wears glasses at all times  Hearing: Within functional limits    Orientation  Overall Orientation Status: Within Functional Limits     Balance  Sitting Balance: Independent  Standing Balance: Independent  Functional Mobility  Functional - Mobility Device: No device  Activity: Other  Assist Level: Independent  ADL  Additional Comments: Declines ADL, reports no difficulties completing - is independent in room  Tone RUE  RUE Tone: Normotonic  Tone LUE  LUE Tone: Normotonic  Coordination  Movements Are Fluid And Coordinated: Yes     Bed mobility  Supine to Sit: Modified independent  Sit to Supine: Modified independent  Scooting: Independent  Transfers  Sit to stand: Independent  Stand to sit:  Independent     Cognition  Overall Cognitive Status: WFL  Perception  Overall Perceptual Status: WFL     Sensation  Overall Sensation Status: WFL (reports occasional numbness to fingertips)        LUE AROM (degrees)  LUE AROM : WFL  RUE AROM (degrees)  RUE AROM : WFL  LUE Strength  Gross LUE Strength: WFL  RUE Strength  Gross RUE Strength: WFL                   Plan   Plan  Times per week: eval/dc    G-Code     OutComes Score                                                  AM-PAC Score        AM-PAC Inpatient Daily Activity Raw Score: 24 (06/21/21 1612)  AM-PAC Inpatient ADL T-Scale Score : 57.54 (06/21/21 1612)  ADL Inpatient CMS 0-100% Score: 0 (06/21/21 1612)  ADL Inpatient CMS G-Code Modifier : [address] (06/21/21 1612)    Goals  Short term goals  Time Frame for Short term goals: eval/dc       Therapy Time   Individual Concurrent Group Co-treatment   Time In 1540         Time Out 1600         Minutes 20            Timed Code Treatment Minutes:   5 minutes    Total Treatment Minutes:  20 minutes    Billing split with PT secondary to patient's observation status    EL Whitaker OTR/L [id number]    Meenu Deshpande OT

## 2021-06-22 PROBLEM — Q21.12 PATENT FORAMEN OVALE: Status: ACTIVE | Noted: 2021-06-22

## 2021-06-22 PROBLEM — I10 ESSENTIAL HYPERTENSION: Status: RESOLVED | Noted: 2021-06-21 | Resolved: 2021-06-22

## 2021-06-22 PROBLEM — G45.9 TIA (TRANSIENT ISCHEMIC ATTACK): Status: RESOLVED | Noted: 2021-06-21 | Resolved: 2021-06-22

## 2021-06-22 PROBLEM — R42 DIZZINESS: Status: RESOLVED | Noted: 2021-06-21 | Resolved: 2021-06-22

## 2021-06-22 LAB
EKG ATRIAL RATE: 60 BPM
EKG DIAGNOSIS: NORMAL
EKG P AXIS: 50 DEGREES
EKG P-R INTERVAL: 194 MS
EKG Q-T INTERVAL: 422 MS
EKG QRS DURATION: 72 MS
EKG QTC CALCULATION (BAZETT): 422 MS
EKG R AXIS: 22 DEGREES
EKG T AXIS: 26 DEGREES
EKG VENTRICULAR RATE: 60 BPM
LV EF: 63 %
LVEF MODALITY: NORMAL

## 2021-06-22 PROCEDURE — 93010 ELECTROCARDIOGRAM REPORT: CPT | Performed by: INTERNAL MEDICINE

## 2021-06-22 PROCEDURE — 1200000000 HC SEMI PRIVATE

## 2021-06-22 PROCEDURE — 99221 1ST HOSP IP/OBS SF/LOW 40: CPT | Performed by: PSYCHIATRY & NEUROLOGY

## 2021-06-22 PROCEDURE — 6370000000 HC RX 637 (ALT 250 FOR IP): Performed by: INTERNAL MEDICINE

## 2021-06-22 PROCEDURE — G0378 HOSPITAL OBSERVATION PER HR: HCPCS

## 2021-06-22 PROCEDURE — 2580000003 HC RX 258: Performed by: INTERNAL MEDICINE

## 2021-06-22 PROCEDURE — 94760 N-INVAS EAR/PLS OXIMETRY 1: CPT

## 2021-06-22 PROCEDURE — 93306 TTE W/DOPPLER COMPLETE: CPT

## 2021-06-22 RX ADMIN — ASPIRIN 81 MG: 81 TABLET, COATED ORAL at 10:20

## 2021-06-22 RX ADMIN — ATORVASTATIN CALCIUM 80 MG: 80 TABLET, FILM COATED ORAL at 21:32

## 2021-06-22 RX ADMIN — PANTOPRAZOLE SODIUM 40 MG: 40 TABLET, DELAYED RELEASE ORAL at 10:20

## 2021-06-22 RX ADMIN — PANTOPRAZOLE SODIUM 40 MG: 40 TABLET, DELAYED RELEASE ORAL at 05:02

## 2021-06-22 RX ADMIN — Medication 10 ML: at 10:21

## 2021-06-22 ASSESSMENT — PAIN SCALES - GENERAL
PAINLEVEL_OUTOF10: 0

## 2021-06-22 NOTE — PROGRESS NOTES
Hospitalist Progress Note      PCP: Referring Not In System (Inactive)    Date of Admission: 6/21/2021    Chief Complaint: Dizziness, left-sided facial droop and paresthesia    Hospital Course: Kesha Forbes is 60 y.o. female  history of hypertension who presented to ED with complaints of dizziness for a day, paresthesias of the left side of the face and left-sided facial droop. Neurology was consulted for CT head, CTA head/neck and MRI brain showed no acute findings. 2D echocardiogram showed patent foramen ovale. Cardiology consulted on 6/22 to assess the need for JALIL and further management.     Subjective: Patient seen and examined at bedside. Reports that her symptoms are currently resolved. Ambulating in room with ease. Fixated on the fact that she would like to be continued on lisinopril. Explained in detail the need to hold antihypertensive at this time since her BPs have been labile overnight. We will continue to monitor BP readings and resume medications as needed. Medications:  Reviewed    Infusion Medications    sodium chloride       Scheduled Medications    sodium chloride flush  5-40 mL Intravenous 2 times per day    enoxaparin  40 mg Subcutaneous Daily    aspirin  81 mg Oral Daily    Or    aspirin  300 mg Rectal Daily    atorvastatin  80 mg Oral Nightly    pantoprazole  40 mg Oral QAM AC     PRN Meds: perflutren lipid microspheres, sodium chloride flush, sodium chloride, ondansetron **OR** ondansetron, polyethylene glycol, labetalol, hydrOXYzine      Intake/Output Summary (Last 24 hours) at 6/22/2021 1629  Last data filed at 6/21/2021 1731  Gross per 24 hour   Intake 240 ml   Output --   Net 240 ml       Physical Exam Performed:    /68   Pulse 75   Temp 98.1 °F (36.7 °C) (Temporal)   Resp 17   Ht 5' (1.524 m)   Wt 218 lb 14.7 oz (99.3 kg)   SpO2 98%   BMI 42.75 kg/m²     General appearance: No apparent distress, appears stated age and cooperative.   Respiratory: Normal respiratory effort. Clear to auscultation, bilaterally without Rales/Wheezes/Rhonchi. Cardiovascular: Regular rate and rhythm with normal S1/S2 without murmurs, rubs or gallops. Abdomen: Soft, non-tender, non-distended with normal bowel sounds. Musculoskeletal: No clubbing, cyanosis or edema bilaterally. Full range of motion without deformity. Skin: Skin color, texture, turgor normal.  No rashes or lesions. Neurologic:  Neurovascularly intact without any focal sensory/motor deficits. Cranial nerves: II-XII intact, grossly non-focal.  Psychiatric: Alert and oriented, thought content appropriate, normal insight  Capillary Refill: Brisk,3 seconds, normal   Peripheral Pulses: +2 palpable, equal bilaterally       Labs:   Recent Labs     06/21/21 0319   WBC 6.4   HGB 14.4   HCT 44.2        Recent Labs     06/21/21 0319      K 4.7      CO2 28   BUN 15   CREATININE 0.7   CALCIUM 9.4     Recent Labs     06/21/21 0319   AST 18   ALT 7*   BILITOT <0.2   ALKPHOS 101     Recent Labs     06/21/21 0319   INR 0.89     Recent Labs     06/21/21 0319   TROPONINI <0.01       Urinalysis:      Lab Results   Component Value Date    NITRU Negative 06/21/2021    WBCUA 3 06/21/2021    BACTERIA 1+ 06/21/2021    RBCUA 1 06/21/2021    BLOODU Negative 06/21/2021    SPECGRAV 1.016 06/21/2021    GLUCOSEU Negative 06/21/2021       Radiology:  MRI brain without contrast   Final Result   No acute infarct. CTA HEAD NECK W CONTRAST   Final Result   No acute abnormality or flow-limiting stenosis of the major arteries of the   head and neck. Beading of the bilateral internal carotid arteries, likely related to mild   fibromuscular dysplasia. CT Head WO Contrast   Final Result   No acute intracranial abnormality.                  Assessment/Plan:    Active Hospital Problems    Diagnosis     Patent foramen ovale [Q21.1]     GERD (gastroesophageal reflux disease) [K21.9]      Dizziness, left-sided facial droop and paresthesia:  Symptoms are currently resolved. Etiology likely TIA versus orthostatic hypotension. CT imaging and MRI brain without acute findings. Neurology consulted. Recommended 2D echo. 2D echocardiogram showed patent foramen ovale. Requested for cardiology consultation to assess the need for JALIL. On aspirin and statin. Hypertension:  BPs are currently labile. Holding losartan. Orthostatics. DVT Prophylaxis: Lovenox subcu daily  Diet: ADULT DIET; Regular  Code Status: Full Code    PT/OT Eval Status: Independent and ambulating in room    Dispo -cardiology consulted for PFO, possible discharge in a.m.     Alana Blancas MD

## 2021-06-22 NOTE — PROGRESS NOTES
Used  to explain to patient why she should not take the blood pressure medication anymore. IV removed and discharge instructions printed. Waiting for physician to okay discharging patient with the echo results. EF is 60-65%. Note given to patient for work.

## 2021-06-22 NOTE — PROGRESS NOTES
Shift assessment completed, see doc flowsheets. Pt is Georgian speaking, used interpretor [id number]. NIH currently 0, pt states that the numbness/tingling'/burning sensation she had earlier has resolved. Pt has no pain at this time. Call light within reach, will continue to monitor.   Loretha Kayser, RN

## 2021-06-22 NOTE — CONSULTS
In patient Neurology consult        [facility]      Bev MD Haily Nava  1955    Date of Service: 6/22/2021    Referring Physician: Lauro Talley MD      Reason for the consult and CC: New onset dizziness and left facial numbness. HPI:   The patient is a 60y.o.  years old female with history of hyper tension who was admitted to the hospital yesterday with new onset dizziness and left facial paresthesia. Symptoms started few hours prior to admission. Description was recurrent facial numbness on the left side followed by dizziness but no spinning sensation or headache, weakness or numbness or chest pain. Degree was moderate. Duration was persistent. No triggers or other associated symptoms. No relieving factors. Patient came to the ED for evaluation. Initial work-up with neuroimaging was unremarkable. She was admitted. Patient had MRI of the brain yesterday which showed no acute findings. Today she feels somewhat better. She is almost back to her baseline. She was not taking aspirin at home. A1c 5.9. Other review of system was unremarkable.       Family History   Problem Relation Age of Onset    Diabetes Sister      Past Surgical History:   Procedure Laterality Date    BUNIONECTOMY      COLONOSCOPY N/A 9/6/2019    COLONOSCOPY performed by Verito Orourke MD at [facility]  4/2/12    Dilatation and curetage, hysteroscopy, endometrial biopsy    DILATION AND CURETTAGE OF UTERUS  03/09/2017    HYSTEROSCOPY DILATATION AND CURETTAGE MYOSURE    HYSTEROSCOPY      HYSTEROSCOPY N/A 08/16/2018    HYSTEROSCOPY DILATATION AND CURETTAGE MYOSURE    UPPER GASTROINTESTINAL ENDOSCOPY N/A 3/1/2021    EGD BIOPSY performed by Verito Orourke MD at [address]        Past Medical History:   Diagnosis Date    Hypertension      Social History     Tobacco Use    Smoking status: Never Smoker    Smokeless tobacco: Never Used Vaping Use    Vaping Use: Never used   Substance Use Topics    Alcohol use: No    Drug use: No     Frequency: 3.0 times per week     No Known Allergies  Current Facility-Administered Medications   Medication Dose Route Frequency Provider Last Rate Last Admin    perflutren lipid microspheres (DEFINITY) injection 1.65 mg  1.5 mL Intravenous ONCE PRN Mario Anna MD        sodium chloride flush 0.9 % injection 5-40 mL  5-40 mL Intravenous 2 times per day Geoffrey Wu MD   10 mL at 06/22/21 1021    sodium chloride flush 0.9 % injection 5-40 mL  5-40 mL Intravenous PRN Geoffrey Wu MD        0.9 % sodium chloride infusion  25 mL Intravenous PRN Geoffrey Wu MD        ondansetron (ZOFRAN-ODT) disintegrating tablet 4 mg  4 mg Oral Q8H PRN Geoffrey Wu MD        Or    ondansetron (ZOFRAN) injection 4 mg  4 mg Intravenous Q6H PRN Geoffrey Wu MD        polyethylene glycol (GLYCOLAX) packet 17 g  17 g Oral Daily PRN Geoffrey Wu MD        enoxaparin (LOVENOX) injection 40 mg  40 mg Subcutaneous Daily Geoffrey Wu MD   40 mg at 06/21/21 1351    aspirin EC tablet 81 mg  81 mg Oral Daily Geoffrey Wu MD   81 mg at 06/22/21 1020    Or    aspirin suppository 300 mg  300 mg Rectal Daily Geoffrey Wu MD        atorvastatin (LIPITOR) tablet 80 mg  80 mg Oral Nightly Geoffrey Wu MD   80 mg at 06/21/21 2317    labetalol (NORMODYNE;TRANDATE) injection 10 mg  10 mg Intravenous Q10 Min PRN Geoffrey Wu MD        hydrOXYzine (ATARAX) tablet 25 mg  25 mg Oral TID PRN Geoffrey Wu MD   25 mg at 06/21/21 1351    pantoprazole (PROTONIX) tablet 40 mg  40 mg Oral QAM AC Geoffrey Wu MD   40 mg at 06/22/21 1020       ROS : A 10-14 system review of constitutional, cardiovascular, respiratory, eyes, musculoskeletal, endocrine, GI, ENT, skin, hematological, genitourinary, psychiatric and neurologic systems was obtained and updated today and is unremarkable except as mentioned in my HPI      Exam: Constitutional:   Vitals:    06/21/21 2000 06/22/21 0045 06/22/21 0445 06/22/21 1000   BP: 125/68 (!) 101/51 115/69 124/68   Pulse: 58 63 60 75   Resp: 15 17 14 17   Temp: 97.3 °F (36.3 °C) 96.6 °F (35.9 °C) 97.5 °F (36.4 °C) 98.1 °F (36.7 °C)   TempSrc: Temporal Temporal Temporal Temporal   SpO2: 96% 97% 98% 98%   Weight:   218 lb 14.7 oz (99.3 kg)    Height:           General appearance:  Normal development and appear in no acute distress. Eye:  Fundus of the eye: Funduscopic examination cannot be performed due to COVID19 restrictions and precautions. Neck: supple  Cardiovascular: No lower leg edema with good pulsation. Mental Status:   Oriented to person, place, problem, and time. Memory: Good immediate recall. Intact remote memory  Normal attention span and concentration. Language: intact naming, repeating and fluency   Good fund of Knowledge. Aware of current events and vocabulary   Cranial Nerves:   II: Visual fields: Full. Pupils: equal, round, reactive to light  III,IV,VI: Extra Ocular Movements are intact. No nystagmus  V: Facial sensation is intact   VII: Facial strength and movements: intact and symmetric  VIII: Hearing: Intact  IX: Palate elevation is symmetric  XI: Shoulder shrug is intact  XII: Tongue movements are normal  Musculoskeletal: 5/5 in all 4 extremities. Tone: Normal tone. Reflexes: 2+ in the upper extremity and 2+ in the lower extremity   Planters: flexor bilaterally. Coordination: no pronator drift, no dysmetria with FNF in upper extremities. Normal REM. Sensation: normal to all modalities in both arms and legs.   Gait/Posture: steady gait    Data:  LABS:   Lab Results   Component Value Date     06/21/2021    K 4.7 06/21/2021     06/21/2021    CO2 28 06/21/2021    BUN 15 06/21/2021    CREATININE 0.7 06/21/2021    GFRAA >60 06/21/2021    LABGLOM >60 06/21/2021    GLUCOSE 105 06/21/2021    CALCIUM 9.4 06/21/2021     Lab Results   Component Value Date    WBC 6.4 06/21/2021    RBC 5.29 06/21/2021    HGB 14.4 06/21/2021    HCT 44.2 06/21/2021    MCV 83.7 06/21/2021    RDW 15.3 06/21/2021     06/21/2021     Lab Results   Component Value Date    INR 0.89 06/21/2021    PROTIME 10.3 06/21/2021       Neuroimaging were independently reviewed by me and discussed results with the patient  Reviewed notes from different physicians  Reviewed lab and blood testing    Impression:  New onset dizziness with left-sided paresthesia seems to be improving. Possible peripheral vertigo versus TIA  Hypertension, not controlled  Hyperlipidemia    Recommendation:  Aspirin  Statin  Echo  PT and OT  Continue home blood pressure medications  Telemetry  DVT and GI prophylaxis  Long discussion with the patient today regarding risk of strokes  Can be discharged from neurology once medically stable  Follow-up with her PCP    No further recommendation        Thank you for referring such patient. If you have any questions regarding my consult note, please don't hesitate to call me. Demetrius Champion MD  [phone]    This dictation was generated by voice recognition computer software.  Although all attempts are made to edit the dictation for accuracy, there may be errors in the  transcription that are not intended

## 2021-06-23 VITALS
SYSTOLIC BLOOD PRESSURE: 108 MMHG | BODY MASS INDEX: 42.72 KG/M2 | HEIGHT: 60 IN | RESPIRATION RATE: 14 BRPM | WEIGHT: 217.59 LBS | OXYGEN SATURATION: 97 % | DIASTOLIC BLOOD PRESSURE: 66 MMHG | TEMPERATURE: 97.8 F | HEART RATE: 60 BPM

## 2021-06-23 LAB
ANION GAP SERPL CALCULATED.3IONS-SCNC: 8 MMOL/L (ref 3–16)
BASOPHILS ABSOLUTE: 0 K/UL (ref 0–0.2)
BASOPHILS RELATIVE PERCENT: 0.2 %
BUN BLDV-MCNC: 14 MG/DL (ref 7–20)
CALCIUM SERPL-MCNC: 9.1 MG/DL (ref 8.3–10.6)
CHLORIDE BLD-SCNC: 105 MMOL/L (ref 99–110)
CO2: 28 MMOL/L (ref 21–32)
CREAT SERPL-MCNC: 0.6 MG/DL (ref 0.6–1.2)
EOSINOPHILS ABSOLUTE: 0.1 K/UL (ref 0–0.6)
EOSINOPHILS RELATIVE PERCENT: 1.8 %
GFR AFRICAN AMERICAN: >60
GFR NON-AFRICAN AMERICAN: >60
GLUCOSE BLD-MCNC: 102 MG/DL (ref 70–99)
HCT VFR BLD CALC: 39.4 % (ref 36–48)
HEMOGLOBIN: 13 G/DL (ref 12–16)
LV EF: 58 %
LVEF MODALITY: NORMAL
LYMPHOCYTES ABSOLUTE: 2.2 K/UL (ref 1–5.1)
LYMPHOCYTES RELATIVE PERCENT: 41.6 %
MCH RBC QN AUTO: 27.4 PG (ref 26–34)
MCHC RBC AUTO-ENTMCNC: 32.9 G/DL (ref 31–36)
MCV RBC AUTO: 83.3 FL (ref 80–100)
MONOCYTES ABSOLUTE: 0.5 K/UL (ref 0–1.3)
MONOCYTES RELATIVE PERCENT: 9.1 %
NEUTROPHILS ABSOLUTE: 2.5 K/UL (ref 1.7–7.7)
NEUTROPHILS RELATIVE PERCENT: 47.3 %
PDW BLD-RTO: 14.9 % (ref 12.4–15.4)
PLATELET # BLD: 201 K/UL (ref 135–450)
PMV BLD AUTO: 9.3 FL (ref 5–10.5)
POTASSIUM REFLEX MAGNESIUM: 4.4 MMOL/L (ref 3.5–5.1)
RBC # BLD: 4.73 M/UL (ref 4–5.2)
SARS-COV-2, NAAT: NOT DETECTED
SODIUM BLD-SCNC: 141 MMOL/L (ref 136–145)
WBC # BLD: 5.3 K/UL (ref 4–11)

## 2021-06-23 PROCEDURE — 87635 SARS-COV-2 COVID-19 AMP PRB: CPT

## 2021-06-23 PROCEDURE — 85025 COMPLETE CBC W/AUTO DIFF WBC: CPT

## 2021-06-23 PROCEDURE — 93325 DOPPLER ECHO COLOR FLOW MAPG: CPT

## 2021-06-23 PROCEDURE — 93312 ECHO TRANSESOPHAGEAL: CPT

## 2021-06-23 PROCEDURE — 6370000000 HC RX 637 (ALT 250 FOR IP): Performed by: INTERNAL MEDICINE

## 2021-06-23 PROCEDURE — 99232 SBSQ HOSP IP/OBS MODERATE 35: CPT | Performed by: PSYCHIATRY & NEUROLOGY

## 2021-06-23 PROCEDURE — 99152 MOD SED SAME PHYS/QHP 5/>YRS: CPT

## 2021-06-23 PROCEDURE — G0378 HOSPITAL OBSERVATION PER HR: HCPCS

## 2021-06-23 PROCEDURE — 36415 COLL VENOUS BLD VENIPUNCTURE: CPT

## 2021-06-23 PROCEDURE — 7100000010 HC PHASE II RECOVERY - FIRST 15 MIN

## 2021-06-23 PROCEDURE — 80048 BASIC METABOLIC PNL TOTAL CA: CPT

## 2021-06-23 PROCEDURE — 2580000003 HC RX 258: Performed by: INTERNAL MEDICINE

## 2021-06-23 RX ADMIN — Medication 10 ML: at 09:59

## 2021-06-23 RX ADMIN — ASPIRIN 81 MG: 81 TABLET, COATED ORAL at 09:58

## 2021-06-23 RX ADMIN — PANTOPRAZOLE SODIUM 40 MG: 40 TABLET, DELAYED RELEASE ORAL at 05:49

## 2021-06-23 ASSESSMENT — PAIN SCALES - GENERAL: PAINLEVEL_OUTOF10: 0

## 2021-06-23 NOTE — PROGRESS NOTES
Shift assessment complete. See details in flowsheet. VSS. Patient A&O X4. All patient needs met at this time.

## 2021-06-23 NOTE — PRE SEDATION
Sedation Pre-Procedure Note    Patient Name: Dixie Wilson   YOB: 1955  Room/Bed: W9C-0228/0554-08  Medical Record Number: [id number]  Date: 6/23/2021   Time: 11:13 AM       Indication:  Stroke/CVA    Consent: I have discussed with the patient and/or the patient representative the indication, alternatives, and the possible risks and/or complications of the planned procedure and the anesthesia methods. The patient and/or patient representative appear to understand and agree to proceed. Vital Signs:   Vitals:    06/23/21 0800   BP: 138/80   Pulse: 100   Resp: 17   Temp: 97.9 °F (36.6 °C)   SpO2:        Past Medical History:   has a past medical history of Hypertension. Past Surgical History:   has a past surgical history that includes Bunionectomy; Dilation and curettage of uterus (4/2/12); Dilation and curettage of uterus (03/09/2017); hysteroscopy; hysteroscopy (N/A, 08/16/2018); Colonoscopy (N/A, 9/6/2019); and Upper gastrointestinal endoscopy (N/A, 3/1/2021). Medications:   Scheduled Meds:    sodium chloride flush  5-40 mL Intravenous 2 times per day    enoxaparin  40 mg Subcutaneous Daily    aspirin  81 mg Oral Daily    Or    aspirin  300 mg Rectal Daily    atorvastatin  80 mg Oral Nightly    pantoprazole  40 mg Oral QAM AC     Continuous Infusions:    sodium chloride       PRN Meds: perflutren lipid microspheres, sodium chloride flush, sodium chloride, ondansetron **OR** ondansetron, polyethylene glycol, labetalol, hydrOXYzine  Home Meds:   Prior to Admission medications    Medication Sig Start Date End Date Taking? Authorizing Provider   aspirin 81 MG chewable tablet 81 mg daily   Yes Historical Provider, MD   ibuprofen (ADVIL;MOTRIN) 400 MG tablet Take 400 mg by mouth every 6 hours as needed for Pain   Yes Historical Provider, MD   omeprazole (PRILOSEC) 20 MG delayed release capsule Take 20 mg by mouth daily    Historical Provider, MD   Skin Protectants, Misc.  (EUCERIN) cream Apply topically as needed. 1/22/21   DOUGLAS Zaragoza   hydrOXYzine (ATARAX) 25 MG tablet Take 25 mg by mouth 3 times daily as needed for Itching    Historical Provider, MD     Coumadin Use Last 7 Days:  no  Antiplatelet drug therapy use last 7 days: yes - ASA  Other anticoagulant use last 7 days: no  Additional Medication Information:  none      Pre-Sedation Documentation and Exam:   I have personally completed a history, physical exam & review of systems for this patient (see notes). Vital signs have been reviewed (see flow sheet for vitals). I have reviewed the patient's history and review of systems. Vitals:    06/23/21 0800   BP: 138/80   Pulse: 100   Resp: 17   Temp: 97.9 °F (36.6 °C)   SpO2:       Lungs: clear to auscultation bilaterally without crackles or wheezing, Cardiovascular: regular rate and rhythm, no murmurs rubs or gallops.     Mallampati Airway Assessment:  Mallampati Class I - (soft palate, fauces, uvula & anterior/posterior tonsillar pillars are visible)    Prior History of Anesthesia Complications:   none    ASA Classification:  Class 2 - A normal healthy patient with mild systemic disease    Sedation/ Anesthesia Plan:   intravenous sedation    Medications Planned:   midazolam (Versed) intravenously and fentanyl intravenously    Patient is an appropriate candidate for plan of sedation: yes    Electronically signed by Sam Padilla MD on 6/23/2021 at 11:13 AM

## 2021-06-23 NOTE — PROGRESS NOTES
Pt shift assessment completed. Pt is alert and orient * 4. Doesn't appear to be in pain. Pt is Portuguese speaking,ambulate in room and follows command. Pt respiration are regular and unlabored and on room air . Breath sounds clear. .Scheduled medications given as ordered. Patient encouraged to use call light with any needs. Patient states understanding, call light in reach, bed alarm on. All safety checks in place and will continue to monitor pt.

## 2021-06-23 NOTE — PROGRESS NOTES
Becka Perez  Neurology Follow-up  [facility]    Date of Service: 6/23/2021    Subjective:   CC: Follow up today regarding: New onset dizziness    Events noted. Chart and lab reviewed. The patient had an echo which showed PFO. Just came from JALIL. Denies any new symptoms today. Feels back to baseline. ROS : A 10-12 system review obtained and updated today and is unremarkable except as mentioned  in my interval history.        Current Facility-Administered Medications   Medication Dose Route Frequency Provider Last Rate Last Admin    perflutren lipid microspheres (DEFINITY) injection 1.65 mg  1.5 mL Intravenous ONCE PRN Cassandra Veloz MD        sodium chloride flush 0.9 % injection 5-40 mL  5-40 mL Intravenous 2 times per day Pietro Isaac MD   10 mL at 06/23/21 0959    sodium chloride flush 0.9 % injection 5-40 mL  5-40 mL Intravenous PRN Pietro Isaac MD        0.9 % sodium chloride infusion  25 mL Intravenous PRN Pietro Isaac MD        ondansetron (ZOFRAN-ODT) disintegrating tablet 4 mg  4 mg Oral Q8H PRN Pietro Isaac MD        Or    ondansetron (ZOFRAN) injection 4 mg  4 mg Intravenous Q6H PRN Pietro Isaac MD        polyethylene glycol (GLYCOLAX) packet 17 g  17 g Oral Daily PRN Pietro Isaac MD        enoxaparin (LOVENOX) injection 40 mg  40 mg Subcutaneous Daily Pietro Isaac MD   40 mg at 06/21/21 1351    aspirin EC tablet 81 mg  81 mg Oral Daily Pietro Isaac MD   81 mg at 06/23/21 3798    Or    aspirin suppository 300 mg  300 mg Rectal Daily Pietro Isaac MD        atorvastatin (LIPITOR) tablet 80 mg  80 mg Oral Nightly Pietro Isaac MD   80 mg at 06/22/21 2132    labetalol (NORMODYNE;TRANDATE) injection 10 mg  10 mg Intravenous Q10 Min PRN Pietro Isaac MD        hydrOXYzine (ATARAX) tablet 25 mg  25 mg Oral TID PRN Pietro Isaac MD   25 mg at 06/21/21 1351    pantoprazole (PROTONIX) tablet 40 mg  40 mg Oral QAM AC Pietro Isaac MD   40 mg at 06/23/21 0549     No Known Allergies  Past Medical History:   Diagnosis Date    Hypertension          Objective:  Exam:   Constitutional:   Vitals:    06/22/21 2345 06/23/21 0400 06/23/21 0800 06/23/21 1200   BP: 134/76 120/69 138/80 108/66   Pulse: 78 67 100 60   Resp: 18 18 17 14   Temp: 98.2 °F (36.8 °C) 97.9 °F (36.6 °C) 97.9 °F (36.6 °C) 97.8 °F (36.6 °C)   TempSrc: Oral Oral Temporal Temporal   SpO2: 97%      Weight:  217 lb 9.5 oz (98.7 kg)     Height:         General appearance:  Normal development and appear in no acute distress. Mental Status:   Oriented to person, place, problem, and time. Memory: Good immediate recall. Intact remote memory  Normal attention span and concentration. Language: intact naming, repeating and fluency   Good fund of Knowledge. Cranial Nerves:   II:   Pupils: equal, round, reactive to light  III,IV,VI: Extra Ocular Movements are intact. No nystagmus  V: Facial sensation is intact  VII: Facial strength and movements: intact and symmetric  XII: Tongue movements are normal  Musculoskeletal: 5/5 in all 4 extremities. No tremors or dysmetria      Data:  LABS:   Lab Results   Component Value Date     06/23/2021    K 4.4 06/23/2021     06/23/2021    CO2 28 06/23/2021    BUN 14 06/23/2021    CREATININE 0.6 06/23/2021    GFRAA >60 06/23/2021    LABGLOM >60 06/23/2021    GLUCOSE 102 06/23/2021    CALCIUM 9.1 06/23/2021     Lab Results   Component Value Date    WBC 5.3 06/23/2021    RBC 4.73 06/23/2021    HGB 13.0 06/23/2021    HCT 39.4 06/23/2021    MCV 83.3 06/23/2021    RDW 14.9 06/23/2021     06/23/2021     Lab Results   Component Value Date    INR 0.89 06/21/2021    PROTIME 10.3 06/21/2021       Neuroimaging was independently reviewed by me and discussed results with the patient  I reviewed blood testing and other test results and discussed results with the patient      Impression:  New onset dizziness with facial paresthesia.   TIA versus peripheral vertigo  PFO  Hypertension  Hyperlipidemia      Recommendation  Discussed different approach to PFO  Aspirin  Statin  Follow JALIL results and cardiology recommendation  Continue current blood pressure medications  Monitor blood pressure at home  Can be discharged from neurology once medically stable  No further recommendation  We will sign off           Makayla Chaudhry MD   [phone]      This dictation was generated by voice recognition computer software. Although all attempts are made to edit the dictation for accuracy, there may be errors in the transcription that are not intended.

## 2021-06-23 NOTE — DISCHARGE SUMMARY
6/23.  Recommendation was for patient to follow-up as outpatient for PFO occluder implant. Discharged on aspirin and statin.   Hypertension: Patient's blood pressures have been low to normal range. Recommended holding losartan at the time of discharge. Consults. IP CONSULT TO SPIRITUAL SERVICES  IP CONSULT TO NEUROLOGY  IP CONSULT TO CARDIOLOGY    Physical examination on discharge day. /66   Pulse 60   Temp 97.8 °F (36.6 °C) (Temporal)   Resp 14   Ht 5' (1.524 m)   Wt 217 lb 9.5 oz (98.7 kg)   SpO2 97%   BMI 42.50 kg/m²   General appearance: No apparent distress, appears stated age and cooperative. Respiratory:  Normal respiratory effort. Clear to auscultation, bilaterally without Rales/Wheezes/Rhonchi. Cardiovascular: Regular rate and rhythm with normal S1/S2 without murmurs, rubs or gallops. Abdomen: Soft, non-tender, non-distended with normal bowel sounds. Musculoskeletal: No clubbing, cyanosis or edema bilaterally. Full range of motion without deformity. Skin: Skin color, texture, turgor normal.  No rashes or lesions. Neurologic:  Neurovascularly intact without any focal sensory/motor deficits. Cranial nerves: II-XII intact, grossly non-focal.  Psychiatric: Alert and oriented, thought content appropriate, normal insight  Capillary Refill: Brisk,3 seconds, normal   Peripheral Pulses: +2 palpable, equal bilaterally         Condition at time of discharge stable    Medication instructions provided to patient at discharge. Medication List      CONTINUE taking these medications    aspirin 81 MG chewable tablet     eucerin cream  Apply topically as needed.      hydrOXYzine 25 MG tablet  Commonly known as: ATARAX     ibuprofen 400 MG tablet  Commonly known as: ADVIL;MOTRIN     omeprazole 20 MG delayed release capsule  Commonly known as: PRILOSEC        STOP taking these medications    cephALEXin 500 MG capsule  Commonly known as: KEFLEX     losartan 50 MG tablet  Commonly known as:

## 2021-06-23 NOTE — PLAN OF CARE
Problem: Infection:  Goal: Will remain free from infection  Description: Will remain free from infection  6/23/2021 1402 by Micheline Haywood RN  Outcome: Completed     Problem: Safety:  Goal: Free from accidental physical injury  Description: Free from accidental physical injury  6/23/2021 1402 by Micheline Haywood RN  Outcome: Completed    Goal: Free from intentional harm  Description: Free from intentional harm  6/23/2021 1402 by Micheline Haywood RN  Outcome: Completed       Problem: Daily Care:  Goal: Daily care needs are met  Description: Daily care needs are met  6/23/2021 1402 by Michleine Haywood RN  Outcome: Completed    Problem: Pain:  Goal: Patient's pain/discomfort is manageable  Description: Patient's pain/discomfort is manageable  6/23/2021 1402 by Micheline Haywood RN  Outcome: Completed    Problem: Skin Integrity:  Goal: Skin integrity will stabilize  Description: Skin integrity will stabilize  6/23/2021 1402 by Micheline Haywood RN  Outcome: Completed    Problem: Discharge Planning:  Goal: Patients continuum of care needs are met  Description: Patients continuum of care needs are met  6/23/2021 1402 by Micheline Haywood RN  Outcome: Completed

## 2021-10-11 ENCOUNTER — TELEPHONE (OUTPATIENT)
Dept: CARDIOLOGY CLINIC | Age: 66
End: 2021-10-11

## 2021-10-11 ENCOUNTER — OFFICE VISIT (OUTPATIENT)
Dept: CARDIOLOGY CLINIC | Age: 66
End: 2021-10-11
Payer: COMMERCIAL

## 2021-10-11 VITALS
WEIGHT: 212.8 LBS | HEART RATE: 63 BPM | DIASTOLIC BLOOD PRESSURE: 70 MMHG | HEIGHT: 60 IN | SYSTOLIC BLOOD PRESSURE: 108 MMHG | BODY MASS INDEX: 41.78 KG/M2

## 2021-10-11 DIAGNOSIS — I77.3 BILATERAL CAROTID ARTERY FIBROMUSCULAR HYPERPLASIA (HCC): Primary | ICD-10-CM

## 2021-10-11 DIAGNOSIS — E66.01 MORBID OBESITY (HCC): ICD-10-CM

## 2021-10-11 DIAGNOSIS — E78.00 HYPERCHOLESTEROLEMIA: ICD-10-CM

## 2021-10-11 DIAGNOSIS — G45.9 TIA (TRANSIENT ISCHEMIC ATTACK): ICD-10-CM

## 2021-10-11 PROCEDURE — 1090F PRES/ABSN URINE INCON ASSESS: CPT | Performed by: INTERNAL MEDICINE

## 2021-10-11 PROCEDURE — 1036F TOBACCO NON-USER: CPT | Performed by: INTERNAL MEDICINE

## 2021-10-11 PROCEDURE — G8428 CUR MEDS NOT DOCUMENT: HCPCS | Performed by: INTERNAL MEDICINE

## 2021-10-11 PROCEDURE — G8417 CALC BMI ABV UP PARAM F/U: HCPCS | Performed by: INTERNAL MEDICINE

## 2021-10-11 PROCEDURE — 1123F ACP DISCUSS/DSCN MKR DOCD: CPT | Performed by: INTERNAL MEDICINE

## 2021-10-11 PROCEDURE — G8484 FLU IMMUNIZE NO ADMIN: HCPCS | Performed by: INTERNAL MEDICINE

## 2021-10-11 PROCEDURE — 93000 ELECTROCARDIOGRAM COMPLETE: CPT | Performed by: INTERNAL MEDICINE

## 2021-10-11 PROCEDURE — 4040F PNEUMOC VAC/ADMIN/RCVD: CPT | Performed by: INTERNAL MEDICINE

## 2021-10-11 PROCEDURE — 99204 OFFICE O/P NEW MOD 45 MIN: CPT | Performed by: INTERNAL MEDICINE

## 2021-10-11 PROCEDURE — 3017F COLORECTAL CA SCREEN DOC REV: CPT | Performed by: INTERNAL MEDICINE

## 2021-10-11 PROCEDURE — G8400 PT W/DXA NO RESULTS DOC: HCPCS | Performed by: INTERNAL MEDICINE

## 2021-10-11 PROCEDURE — 93270 REMOTE 30 DAY ECG REV/REPORT: CPT | Performed by: INTERNAL MEDICINE

## 2021-10-11 RX ORDER — ATORVASTATIN CALCIUM 40 MG/1
40 TABLET, FILM COATED ORAL DAILY
Qty: 90 TABLET | Refills: 3 | Status: SHIPPED | OUTPATIENT
Start: 2021-10-11 | End: 2022-07-20

## 2021-10-11 NOTE — PATIENT INSTRUCTIONS
-In 1 month (around November 11) get blood work.   -Wear a 30 day monitor to look for irregular heartbeat  -Follow-up with Dr. Yolanda Penn as needed

## 2021-10-11 NOTE — Clinical Note
Gerald:  Thanks for referring her. She perhaps had a TIA. No stroke. PFO closure indicated/approved for 18-60, cryptogenic stroke. Since she's 73 and had no stroke, I did not offer closure today. 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 As a general rule, I don't close for TIAs unless young and they are repeated (at least 2). TIA are very hard to diagnose (could be neuropathy, migraine, etc), and as I said, they are only FDA approved for closure in stroke pts (defined by brain MRI showing infarct). Just for what it's worth. THanks  Winnie Otto    PS: I did get a 30 day monitor and start a statin---just in case!

## 2021-10-11 NOTE — PROGRESS NOTES
History from patient/chart:    73 y.o. admitted in June 2021 with dizziness and facial numbness. Had recurrent symptoms on left side of face as well as the dizziness. Of note, she was not on asa at the time of sx, and further imaging showed mild bilateral carotid artery FMD.    No problems since then. No N/T/weakness. No speech problems. No problems with walking/vision. No fluttering or palps. Has lost weight so feels better. No cp or sob. Walks. Feels better. Past Medical History:   Diagnosis Date    Hypertension      Past Surgical History:   Procedure Laterality Date    BUNIONECTOMY      COLONOSCOPY N/A 9/6/2019    COLONOSCOPY performed by Syeda Birmingham MD at [facility]  4/2/12    Dilatation and curetage, hysteroscopy, endometrial biopsy    DILATION AND CURETTAGE OF UTERUS  03/09/2017    HYSTEROSCOPY DILATATION AND CURETTAGE MYOSURE    HYSTEROSCOPY      HYSTEROSCOPY N/A 08/16/2018    HYSTEROSCOPY DILATATION AND CURETTAGE MYOSURE    UPPER GASTROINTESTINAL ENDOSCOPY N/A 3/1/2021    EGD BIOPSY performed by Syeda Birmingham MD at [address] History     Tobacco Use    Smoking status: Never Smoker    Smokeless tobacco: Never Used   Vaping Use    Vaping Use: Never used   Substance Use Topics    Alcohol use: No    Drug use: No     Frequency: 3.0 times per week     No Known Allergies    Family History   Problem Relation Age of Onset    Diabetes Sister      Review of Systems   General: No fevers, chills, fatigue, or night sweats. No abnormal changes in weight. HEENT: No blurry or decreased vision. No changes in hearing, nasal discharge or sore throat. Cardiovascular: See HPI. No cramping in legs or buttocks when walking. Respiratory: No cough, hemoptysis, or wheezing. No history of asthma. Gastrointestinal: No abdominal pain, hematochezia, melana, or history of GI ulcers. Genito-Urinary: No dysuria or hematuria.  No urgency or polyuria. Musculoskeletal: No complaints of joint pain, joint swelling or muscular weakness/soreness. Neurological: No dizziness or headaches. No numbness/tingling, speech problems or weakness. Psychological: No anxiety or depression  Hematological and Lymphatic: No abnormal bleeding or bruising, blood clots, jaundice. Endocrine: No malaise/lethargy, palpitations, polydipsia/polyuria, temperature intolerance or unexpected weight changes. Skin: No rashes or non-healing ulcers. PE:  Blood pressure 108/70, pulse 63, height 5' (1.524 m), weight 212 lb 12.8 oz (96.5 kg), not currently breastfeeding. General (appearance): Well devel. No distress  Eyes: Anicteric. EOMI  Neck: supple. No jvd  Ears/Nose/Mouth/Thorat: No cyanosis  CV: RRR. No m/r/g   Respiratory:  Clear b, nromal respiratory effort  GI: abd s/nt/nd  Skin: Warm, dry. No rashes  Neuro/Psych: Alert and oriented x 3. Appropriate behavior  Ext:  No c/c.  No edema  Pulses:  2+ radial B    Lab Results   Component Value Date    WBC 5.3 06/23/2021    HGB 13.0 06/23/2021    HCT 39.4 06/23/2021    MCV 83.3 06/23/2021     06/23/2021     Lab Results   Component Value Date     06/23/2021    K 4.4 06/23/2021     06/23/2021    CO2 28 06/23/2021    BUN 14 06/23/2021    CREATININE 0.6 06/23/2021    GLUCOSE 102 (H) 06/23/2021    CALCIUM 9.1 06/23/2021    PROT 7.6 06/21/2021    LABALBU 4.2 06/21/2021    BILITOT <0.2 06/21/2021    ALKPHOS 101 06/21/2021    AST 18 06/21/2021    ALT 7 (L) 06/21/2021    LABGLOM >60 06/23/2021    GFRAA >60 06/23/2021    AGRATIO 1.2 06/21/2021    GLOB 3.4 06/21/2021     Lab Results   Component Value Date    INR 0.89 06/21/2021    PROTIME 10.3 06/21/2021     Lab Results   Component Value Date    CHOL 171 06/21/2021    CHOL 158 08/07/2020     Lab Results   Component Value Date    TRIG 76 06/21/2021    TRIG 99 08/07/2020     Lab Results   Component Value Date    HDL 55 06/21/2021    HDL 47 08/07/2020     Lab Results Component Value Date    LDLCALC 101 (H) 06/21/2021    LDLCALC 91 08/07/2020     Lab Results   Component Value Date    LABVLDL 15 06/21/2021    LABVLDL 20 08/07/2020     No results found for: CHOLHDLRATIO    6/2021 MRI Brain: No stroke    6/21/2021 CTA Head/Neck:  No acute abnormality or flow-limiting stenosis of the major arteries of the   head and neck.       Beading of the bilateral internal carotid arteries, likely related to mild   fibromuscular dysplasia. 6/23/2021 JALIL: PFO with R to L shunt. Recommendations: PFO occluder implant as outpatient. Will refer to structural heart cardiology      Current Outpatient Medications:     omeprazole (PRILOSEC) 20 MG delayed release capsule, Take 20 mg by mouth daily, Disp: , Rfl:     Skin Protectants, Misc. (EUCERIN) cream, Apply topically as needed. , Disp: 1 Package, Rfl: 0    aspirin 81 MG chewable tablet, 81 mg daily, Disp: , Rfl:     hydrOXYzine (ATARAX) 25 MG tablet, Take 25 mg by mouth 3 times daily as needed for Itching, Disp: , Rfl:     ibuprofen (ADVIL;MOTRIN) 400 MG tablet, Take 400 mg by mouth every 6 hours as needed for Pain, Disp: , Rfl:     A/P:  73 y.o. here for POSSIBLE TIA. No stroke. Has bilateral carotid FMD (mild) on cta. 1. Bilateral carotid artery fibromuscular hyperplasia ([address]    2. Morbid obesity ([address]    3. Hypercholesterolemia    4. TIA (transient ischemic attack)        Recs:  -Cont ASA  -Start atorvastatin 40 mg daily  -CMP/lipids in 1 month  -30 day monitor  -F/U as needed. Hard to tell if this was true TIA or other issue; either way no indication for PFO closure.

## 2021-11-16 PROCEDURE — 93272 ECG/REVIEW INTERPRET ONLY: CPT | Performed by: INTERNAL MEDICINE

## 2021-11-17 DIAGNOSIS — G45.9 TIA (TRANSIENT ISCHEMIC ATTACK): ICD-10-CM

## 2021-12-15 ENCOUNTER — HOSPITAL ENCOUNTER (OUTPATIENT)
Dept: WOMENS IMAGING | Age: 66
Discharge: HOME | End: 2021-12-15
Payer: COMMERCIAL

## 2021-12-15 VITALS — BODY MASS INDEX: 37.3 KG/M2 | HEIGHT: 60 IN | WEIGHT: 190 LBS

## 2021-12-15 DIAGNOSIS — Z12.31 BREAST CANCER SCREENING BY MAMMOGRAM: ICD-10-CM

## 2021-12-15 PROCEDURE — 77063 BREAST TOMOSYNTHESIS BI: CPT

## 2022-03-07 ENCOUNTER — TELEPHONE (OUTPATIENT)
Dept: CARDIOLOGY CLINIC | Age: 67
End: 2022-03-07

## 2022-03-07 NOTE — TELEPHONE ENCOUNTER
Asking for an appointment 3/8/22, in [facility]. Please call to schedule.

## 2022-03-08 ENCOUNTER — OFFICE VISIT (OUTPATIENT)
Dept: CARDIOLOGY CLINIC | Age: 67
End: 2022-03-08
Payer: COMMERCIAL

## 2022-03-08 VITALS
HEART RATE: 56 BPM | SYSTOLIC BLOOD PRESSURE: 102 MMHG | BODY MASS INDEX: 35.93 KG/M2 | RESPIRATION RATE: 18 BRPM | WEIGHT: 183 LBS | HEIGHT: 60 IN | DIASTOLIC BLOOD PRESSURE: 60 MMHG | OXYGEN SATURATION: 96 %

## 2022-03-08 DIAGNOSIS — I47.1 SVT (SUPRAVENTRICULAR TACHYCARDIA) (HCC): ICD-10-CM

## 2022-03-08 DIAGNOSIS — I49.9 IRREGULAR HEART RATE: Primary | ICD-10-CM

## 2022-03-08 DIAGNOSIS — G45.9 TIA (TRANSIENT ISCHEMIC ATTACK): ICD-10-CM

## 2022-03-08 DIAGNOSIS — Q21.12 PFO (PATENT FORAMEN OVALE): ICD-10-CM

## 2022-03-08 PROCEDURE — 1123F ACP DISCUSS/DSCN MKR DOCD: CPT | Performed by: INTERNAL MEDICINE

## 2022-03-08 PROCEDURE — 3017F COLORECTAL CA SCREEN DOC REV: CPT | Performed by: INTERNAL MEDICINE

## 2022-03-08 PROCEDURE — 4040F PNEUMOC VAC/ADMIN/RCVD: CPT | Performed by: INTERNAL MEDICINE

## 2022-03-08 PROCEDURE — 99214 OFFICE O/P EST MOD 30 MIN: CPT | Performed by: INTERNAL MEDICINE

## 2022-03-08 PROCEDURE — 1090F PRES/ABSN URINE INCON ASSESS: CPT | Performed by: INTERNAL MEDICINE

## 2022-03-08 PROCEDURE — 93000 ELECTROCARDIOGRAM COMPLETE: CPT | Performed by: INTERNAL MEDICINE

## 2022-03-08 PROCEDURE — G8417 CALC BMI ABV UP PARAM F/U: HCPCS | Performed by: INTERNAL MEDICINE

## 2022-03-08 PROCEDURE — G8400 PT W/DXA NO RESULTS DOC: HCPCS | Performed by: INTERNAL MEDICINE

## 2022-03-08 PROCEDURE — G8484 FLU IMMUNIZE NO ADMIN: HCPCS | Performed by: INTERNAL MEDICINE

## 2022-03-08 PROCEDURE — 1036F TOBACCO NON-USER: CPT | Performed by: INTERNAL MEDICINE

## 2022-03-08 PROCEDURE — G8427 DOCREV CUR MEDS BY ELIG CLIN: HCPCS | Performed by: INTERNAL MEDICINE

## 2022-03-08 RX ORDER — MECLIZINE HYDROCHLORIDE 25 MG/1
TABLET ORAL
COMMUNITY
Start: 2022-02-12 | End: 2022-07-20

## 2022-03-08 NOTE — PROGRESS NOTES
[address]  3/8/22  Referring: Dr. Hernandez ref. provider found    REASON FOR CONSULT/CHIEF COMPLAINT/HPI     Reason for visit/ Chief complaint     Chief Complaint   Patient presents with    New Patient   Palpitations, dizziness   HPI Isaac Lawson is a 73 y.o.  woman who was admitted in June 2021 for a suspected TIA. During this admission she was sent home with an event monitor to evaluate for atrial fibrillation, and also had an echo suspicious for a PFO and a JALIL confirming a PFO. I speak Mongolian fluently and thus did not use an  for this visit. Today she states that she is feeling fine. She notes that until about a month ago she had been feeling pretty dizzy but after having some medication changes her dizziness has resolved. She has not had any presyncope or syncopal spells. She has occasional episodes where she feels like her esophagus is \"too dry. \"  She does not have any chest pain or shortness of breath. Her strokelike symptoms have all totally resolved    Patient is adherent with medications and is tolerating them well without side effects     HISTORY/ALLERGIES/ROS     MedHx:  has a past medical history of Hypertension. Possible TIA in 2021  SurgHx:  has a past surgical history that includes Bunionectomy; Dilation and curettage of uterus (4/2/12); Dilation and curettage of uterus (03/09/2017); hysteroscopy; hysteroscopy (N/A, 08/16/2018); Colonoscopy (N/A, 9/6/2019); and Upper gastrointestinal endoscopy (N/A, 3/1/2021). SocHx:  reports that she has never smoked. She has never used smokeless tobacco. She reports that she does not drink alcohol and does not use drugs. FamHx: No family history of premature coronary artery disease, sudden death, or aneurysm  Allergies: Patient has no known allergies. MEDICATIONS      Prior to Admission medications    Medication Sig Start Date End Date Taking?  Authorizing Provider   meclizine (ANTIVERT) 25 MG tablet TAKE 1 TABLET BY MOUTH THREE TIMES DAILY AS NEEDED FOR DIZZINESS 2/12/22  Yes Historical Provider, MD   aspirin 81 MG chewable tablet 81 mg daily   Yes Historical Provider, MD   diclofenac (VOLTAREN) 50 MG EC tablet  3/7/22   Historical Provider, MD   atorvastatin (LIPITOR) 40 MG tablet Take 1 tablet by mouth daily  Patient not taking: Reported on 3/8/2022 10/11/21   Jim Patterson MD   omeprazole (PRILOSEC) 20 MG delayed release capsule Take 20 mg by mouth daily  Patient not taking: Reported on 3/8/2022    Historical Provider, MD   Skin Protectants, Misc. (EUCERIN) cream Apply topically as needed.   Patient not taking: Reported on 3/8/2022 1/22/21   DOUGLAS Stauffer   hydrOXYzine (ATARAX) 25 MG tablet Take 25 mg by mouth 3 times daily as needed for Itching  Patient not taking: Reported on 3/8/2022    Historical Provider, MD   ibuprofen (ADVIL;MOTRIN) 400 MG tablet Take 400 mg by mouth every 6 hours as needed for Pain  Patient not taking: Reported on 3/8/2022    Historical Provider, MD       PHYSICAL EXAM        Vitals:    03/08/22 1438   BP: 102/60   Pulse: 56   Resp: 18   SpO2: 96%    Weight: 183 lb (83 kg)     Gen Alert, cooperative, no distress Heart  Regular rate and rhythm, no murmur   Head Normocephalic, atraumatic, no abnormalities Abd  Soft, NT, +BS, no mass, no OM   Eyes PERRLA, conj/corn clear Ext  Ext nl, AT, no C/C, no edema   Nose Nares normal, no drain age, Non-tender Pulse 2+ and symmetric   Throat Lips, mucosa, tongue normal Skin Color/text/turg nl, no rash/lesions   Neck S/S, TM, NT, no bruit Psych Nl mood and affect   Lung CTA-B, unlabored, no DTP     Ch wall NT, no deform       LABS and Imaging     Relevant and available CV data reviewed    EKG personally interpreted: normal sinus rhythm, within normal limits    Lab Results   Component Value Date    CHOL 171 06/21/2021    TRIG 76 06/21/2021    HDL 55 06/21/2021    LDLCALC 101 06/21/2021    LABVLDL 15 06/21/2021         No results found for: PROBNP      Troponin   Date/Time Value Ref Range Status   06/21/2021 03:19 AM <0.01 <0.01 ng/mL Final     Comment:     Methodology by Troponin T   08/07/2020 05:47 AM <0.01 <0.01 ng/mL Final     Comment:     Methodology by Troponin T   08/07/2020 01:55 AM <0.01 <0.01 ng/mL Final     Comment:     Methodology by Troponin T           Echo: Surface echo and JALIL were reviewed, these are essentially unremarkable except for a PFO    Event monitor: No atrial fibrillation noted, she had a few short runs of SVT, the fastest was 155 beats a minute, the longest was 14 seconds long      ModerateRisk  ModerateComplexity/Medical Decision Making    Outside/Care everywhere records Reviewed  Labs Reviewed  Prior Imaging, ekg, echo reviewed when available  Medications reviewed  Old Notes reviewed  ASSESSMENT AND PLAN     Encounter Diagnoses   Name Primary?  Irregular heart rate Yes    SVT (supraventricular tachycardia) (HCC)     PFO (patent foramen ovale)     TIA (transient ischemic attack)      1. PFO/TIA -I reviewed with her that although she has a PFO she has not had evidence of a true stroke thus it is not generally recommended to close a PFO. Also since she is already 73 and has not had a stroke yet, if she were to have a stroke in the future it would be likely to may be an alternative explanation for it. To date she has not had any evidence of atrial fibrillation. 2.  Dizziness-this appears to been resolved    3. SVT -she had SVT on the event monitor but is not currently having palpitations or dizziness. Her runs are very short and her LV function is normal thus it is not crucial to provide any treatment for this at this time. I reviewed with her that SVT is generally more of a nuisance condition and not anything life-threatening the vast majority of the time. Patient counseled on lifestyle modification, diet, and exercise.     Follow Up: 1 year     Abelardo Cordova MD  Cardiologist [facility]

## 2022-03-08 NOTE — PATIENT INSTRUCTIONS
You had a small amount of SVT on your monitor, however this is not causing you any symptoms, so no treatment is necessary for this. You have a small hole in your heart called a PFO. This is something you were born with. In some cases, when people have had a stroke, we recommend closing this. Since you just had a TIA, there isn't a lot of evidence that closing it is going to make a difference. No new medications or testing today. Follow up in 1 year.    ------------  En el monitor usted tuvo algunos episodios cortos de jessica arrithmia rapida que se llama SVT. [place] es jessica condicion no muy grave, y normalmente no amenaza a la south. Se puede tratarlo muy facilmente, sin embargo, cara usted no tiene sintomas del ritmo cardiaco en hari momento, no hace falta tratarlo con medicina. Usted tiene un agujero muy brendan en la parte superior del mykel que se llama patent foramen ovale (PFO). [place] es [employer], per a veces puede causar un golpe cerebral.  Si tiene un golpe cerebral en el futuro, potencialmente sera necesario ofrecerle un procediemiento para cerrarlo.     Nos vemos en 12 meses

## 2022-03-10 ENCOUNTER — APPOINTMENT (OUTPATIENT)
Dept: GENERAL RADIOLOGY | Age: 67
End: 2022-03-10
Payer: COMMERCIAL

## 2022-03-10 ENCOUNTER — HOSPITAL ENCOUNTER (EMERGENCY)
Age: 67
Discharge: HOME | End: 2022-03-10
Attending: EMERGENCY MEDICINE
Payer: COMMERCIAL

## 2022-03-10 VITALS
OXYGEN SATURATION: 100 % | RESPIRATION RATE: 16 BRPM | DIASTOLIC BLOOD PRESSURE: 75 MMHG | HEART RATE: 59 BPM | SYSTOLIC BLOOD PRESSURE: 118 MMHG | TEMPERATURE: 98 F

## 2022-03-10 DIAGNOSIS — R42 LIGHTHEADEDNESS: ICD-10-CM

## 2022-03-10 DIAGNOSIS — R07.89 CHEST TIGHTNESS: Primary | ICD-10-CM

## 2022-03-10 DIAGNOSIS — F41.0 PANIC ATTACK: ICD-10-CM

## 2022-03-10 DIAGNOSIS — F41.1 ANXIETY STATE: ICD-10-CM

## 2022-03-10 LAB
ANION GAP SERPL CALCULATED.3IONS-SCNC: 10 MMOL/L (ref 3–16)
BASOPHILS ABSOLUTE: 0.1 K/UL (ref 0–0.2)
BASOPHILS RELATIVE PERCENT: 0.8 %
BUN BLDV-MCNC: 10 MG/DL (ref 7–20)
CALCIUM SERPL-MCNC: 9.7 MG/DL (ref 8.3–10.6)
CHLORIDE BLD-SCNC: 104 MMOL/L (ref 99–110)
CO2: 25 MMOL/L (ref 21–32)
CREAT SERPL-MCNC: 0.6 MG/DL (ref 0.6–1.2)
EOSINOPHILS ABSOLUTE: 0.1 K/UL (ref 0–0.6)
EOSINOPHILS RELATIVE PERCENT: 1.6 %
GFR AFRICAN AMERICAN: >60
GFR NON-AFRICAN AMERICAN: >60
GLUCOSE BLD-MCNC: 90 MG/DL (ref 70–99)
HCT VFR BLD CALC: 41.9 % (ref 36–48)
HEMOGLOBIN: 13.5 G/DL (ref 12–16)
LYMPHOCYTES ABSOLUTE: 2.9 K/UL (ref 1–5.1)
LYMPHOCYTES RELATIVE PERCENT: 47.2 %
MCH RBC QN AUTO: 27.1 PG (ref 26–34)
MCHC RBC AUTO-ENTMCNC: 32.3 G/DL (ref 31–36)
MCV RBC AUTO: 84 FL (ref 80–100)
MONOCYTES ABSOLUTE: 0.6 K/UL (ref 0–1.3)
MONOCYTES RELATIVE PERCENT: 9.5 %
NEUTROPHILS ABSOLUTE: 2.5 K/UL (ref 1.7–7.7)
NEUTROPHILS RELATIVE PERCENT: 40.9 %
PDW BLD-RTO: 15.2 % (ref 12.4–15.4)
PLATELET # BLD: 245 K/UL (ref 135–450)
PMV BLD AUTO: 9.2 FL (ref 5–10.5)
POTASSIUM SERPL-SCNC: 4.3 MMOL/L (ref 3.5–5.1)
RBC # BLD: 4.99 M/UL (ref 4–5.2)
SODIUM BLD-SCNC: 139 MMOL/L (ref 136–145)
TROPONIN: <0.01 NG/ML
WBC # BLD: 6.2 K/UL (ref 4–11)

## 2022-03-10 PROCEDURE — 84484 ASSAY OF TROPONIN QUANT: CPT

## 2022-03-10 PROCEDURE — 85025 COMPLETE CBC W/AUTO DIFF WBC: CPT

## 2022-03-10 PROCEDURE — 93005 ELECTROCARDIOGRAM TRACING: CPT | Performed by: PHYSICIAN ASSISTANT

## 2022-03-10 PROCEDURE — 71045 X-RAY EXAM CHEST 1 VIEW: CPT

## 2022-03-10 PROCEDURE — 99284 EMERGENCY DEPT VISIT MOD MDM: CPT

## 2022-03-10 PROCEDURE — 80048 BASIC METABOLIC PNL TOTAL CA: CPT

## 2022-03-10 PROCEDURE — 6370000000 HC RX 637 (ALT 250 FOR IP): Performed by: PHYSICIAN ASSISTANT

## 2022-03-10 PROCEDURE — 36415 COLL VENOUS BLD VENIPUNCTURE: CPT

## 2022-03-10 RX ORDER — LORAZEPAM 1 MG/1
1 TABLET ORAL ONCE
Status: COMPLETED | OUTPATIENT
Start: 2022-03-10 | End: 2022-03-10

## 2022-03-10 RX ADMIN — LORAZEPAM 1 MG: 1 TABLET ORAL at 15:33

## 2022-03-10 ASSESSMENT — ENCOUNTER SYMPTOMS
DIARRHEA: 0
RHINORRHEA: 0
ABDOMINAL PAIN: 0
NAUSEA: 0
CHEST TIGHTNESS: 1
COUGH: 0
WHEEZING: 0
VOMITING: 0
SHORTNESS OF BREATH: 1

## 2022-03-10 NOTE — LETTER
[facility] Emergency Department  [address] [place] [address], [address]             March 10, 2022    Patient: Drea Rosales   YOB: 1955   Date of Visit: 3/10/2022       To Whom It May Concern:    Kenyatta Cabello was seen and treated in our emergency department on 3/10/2022.        Sincerely,

## 2022-03-10 NOTE — ED PROVIDER NOTES
[address]        Pt Name: Aaliyah Santos  MRN: [id number]  [place] 1955  Date of evaluation: 3/10/2022  Provider: Humberto Vann PA-C  PCP: Maryann Van MD  Note Started: 3:21 PM EST        I have seen and evaluated this patient with my supervising physician Ursula Guerrero MD.    [address] [place] [address]       Chief Complaint   Patient presents with    Other     Arrived by [address] [facility] from [facility] rt call for illness; pt reports feeling dizzy, clammy & nervous. HISTORY OF PRESENT ILLNESS   (Location, Timing/Onset, Context/Setting, Quality, Duration, Modifying Factors, Severity, Associated Signs and Symptoms)  Note limiting factors. Chief Complaint: Nervousness     Aaliyah Santos is a 73 y.o. female who presents for evaluation of feeling anxious dizzy and short of breath while at work. Patient states that she has been feeling this frequently recently and has been seen by PCP and cardiology. Negative work-up, given hydroxyzine and was using this for 3 days, however, states that it made her tired and dizzy so she stopped taking it. Family at bedside providing additional history as well stating that the patient thinks that there is something wrong with her heart because she feels short of breath when she puts the mask on. She denies any current chest pain or shortness of breath. No recent illness. No fevers or chills. No spinning type sensation, focal weakness, visual disturbances or syncope. She has no other complaints or concerns at this time. Above information was obtained using  #[id number]. Nursing Notes were all reviewed and agreed with or any disagreements were addressed in the HPI. REVIEW OF SYSTEMS    (2-9 systems for level 4, 10 or more for level 5)     Review of Systems   Constitutional: Negative for appetite change, chills and fever.    HENT: Negative for congestion and rhinorrhea. Respiratory: Positive for chest tightness and shortness of breath. Negative for cough and wheezing. Cardiovascular: Negative for chest pain. Gastrointestinal: Negative for abdominal pain, diarrhea, nausea and vomiting. Genitourinary: Negative for difficulty urinating, dysuria and hematuria. Musculoskeletal: Negative for neck pain and neck stiffness. Skin: Negative for rash. Neurological: Positive for dizziness. Negative for headaches. Psychiatric/Behavioral: The patient is nervous/anxious. Positives and Pertinent negatives as per HPI. Except as noted above in the ROS, all other systems were reviewed and negative.        PAST MEDICAL HISTORY     Past Medical History:   Diagnosis Date    Hypertension          SURGICAL HISTORY     Past Surgical History:   Procedure Laterality Date    BUNIONECTOMY      COLONOSCOPY N/A 9/6/2019    COLONOSCOPY performed by Omar Montiel MD at [facility]  4/2/12    Dilatation and curetage, hysteroscopy, endometrial biopsy    DILATION AND CURETTAGE OF UTERUS  03/09/2017    HYSTEROSCOPY DILATATION AND CURETTAGE MYOSURE    HYSTEROSCOPY      HYSTEROSCOPY N/A 08/16/2018    HYSTEROSCOPY DILATATION AND CURETTAGE MYOSURE    UPPER GASTROINTESTINAL ENDOSCOPY N/A 3/1/2021    [facility]       Previous Medications    ASPIRIN 81 MG CHEWABLE TABLET    81 mg daily    ATORVASTATIN (LIPITOR) 40 MG TABLET    Take 1 tablet by mouth daily    DICLOFENAC (VOLTAREN) 50 MG EC TABLET        HYDROXYZINE (ATARAX) 25 MG TABLET    Take 25 mg by mouth 3 times daily as needed for Itching     IBUPROFEN (ADVIL;MOTRIN) 400 MG TABLET    Take 400 mg by mouth every 6 hours as needed for Pain     MECLIZINE (ANTIVERT) 25 MG TABLET    TAKE 1 TABLET BY MOUTH THREE TIMES DAILY AS NEEDED FOR DIZZINESS    OMEPRAZOLE (PRILOSEC) 20 MG DELAYED RELEASE CAPSULE    Take 20 mg by mouth daily     SKIN PROTECTANTS, MISC. (EUCERIN) CREAM    Apply topically as needed. ALLERGIES     Patient has no known allergies. FAMILYHISTORY       Family History   Problem Relation Age of Onset    Diabetes Sister           SOCIAL HISTORY       Social History     Tobacco Use    Smoking status: Never Smoker    Smokeless tobacco: Never Used   Vaping Use    Vaping Use: Never used   Substance Use Topics    Alcohol use: No    Drug use: No     Frequency: 3.0 times per week       SCREENINGS             PHYSICAL EXAM    (up to 7 for level 4, 8 or more for level 5)     ED Triage Vitals   BP Temp Temp src Pulse Resp SpO2 Height Weight   03/10/22 1506 03/10/22 1512 -- 03/10/22 1506 03/10/22 1506 03/10/22 1506 -- --   131/72 98 °F (36.7 °C)  61 18 100 %         Physical Exam  Vitals and nursing note reviewed. Constitutional:       Appearance: She is well-developed. She is not diaphoretic. HENT:      Head: Normocephalic and atraumatic. Right Ear: External ear normal.      Left Ear: External ear normal.      Nose: Nose normal.   Eyes:      General:         Right eye: No discharge. Left eye: No discharge. Cardiovascular:      Rate and Rhythm: Normal rate and regular rhythm. Heart sounds: Normal heart sounds. Pulmonary:      Effort: Pulmonary effort is normal. No respiratory distress. Breath sounds: Normal breath sounds. Chest:      Chest wall: No tenderness. Abdominal:      General: There is no distension. Palpations: Abdomen is soft. Tenderness: There is no abdominal tenderness. Musculoskeletal:         General: Normal range of motion. Cervical back: Normal range of motion and neck supple. Skin:     General: Skin is warm and dry. Neurological:      Mental Status: She is alert and oriented to person, place, and time. Mental status is at baseline. GCS: GCS eye subscore is 4. GCS verbal subscore is 5. GCS motor subscore is 6.       Cranial Nerves: Cranial nerves are intact. Psychiatric:         Mood and Affect: Mood is anxious. Affect is tearful. Behavior: Behavior normal.         DIAGNOSTIC RESULTS   LABS:    Labs Reviewed   CBC WITH AUTO DIFFERENTIAL   BASIC METABOLIC PANEL   TROPONIN       When ordered only abnormal lab results are displayed. All other labs were within normal range or not returned as of this dictation. EKG: When ordered, EKG's are interpreted by the Emergency Department Physician in the absence of a cardiologist.  Please see their note for interpretation of EKG. RADIOLOGY:   Non-plain film images such as CT, Ultrasound and MRI are read by the radiologist. Plain radiographic images are visualized and preliminarily interpreted by the ED Provider with the below findings:        Interpretation per the Radiologist below, if available at the time of this note:    XR CHEST PORTABLE   Final Result   Clear lungs. No results found. PROCEDURES   Unless otherwise noted below, none     Procedures    CRITICAL CARE TIME       CONSULTS:  None      EMERGENCY DEPARTMENT COURSE and DIFFERENTIAL DIAGNOSIS/MDM:   Vitals:    Vitals:    03/10/22 1615 03/10/22 1630 03/10/22 1645 03/10/22 1700   BP: 126/71 118/68 118/75    Pulse: 58 58 57 59   Resp: 17 15 17 16   Temp:       SpO2: 99% 98% 98% 100%       Patient was given the following medications:  Medications   LORazepam (ATIVAN) tablet 1 mg (1 mg Oral Given 3/10/22 1533)           Patient presents for evaluation of nervousness and dizziness. On exam, she appears very anxious, tearful but in no acute distress and nontoxic. Vitals are stable and she is afebrile. Lungs are clear to auscultation bilaterally, chest is nontender and abdomen is benign. She was given Ativan for symptomatic relief and will be reevaluated. Please see attending note for EKG interpretation. CBC and BMP are unremarkable. Troponin is negative. Chest x-ray is negative.   Patient had some improvement on reevaluation. Patient does not tolerate hydroxyzine to help with her anxiety she is encouraged to follow back up with PCP. Possible candidate for SSRI therapy. This will not be initiated from the ER. The patient has been evaluated and the history and physical exam suggest a benign etiology. I see nothing to suggest acute coronary syndrome, myocardial infarction, pulmonary embolism, thoracic aortic dissection, significant pericarditis, pneumonia, pneumothorax, or acute abdomen. I feel the patient can be safely discharged to home with outpatient follow up. Instructions have been given for the patient to return to the Emergency Department for any worsening of the symptoms, including but not limited to increased pain, shortness of breath, abdominal pain or weakness. She is agreeable to this plan and stable for discharge at this time. FINAL IMPRESSION      1.  Anxiety state          DISPOSITION/PLAN   DISPOSITION        PATIENT REFERRED TO:  [facility] [address]  [phone]    Call   For a re-check in  1-2  days    [facility] Emergency Department  [address]  Go to   If symptoms worsen      DISCHARGE MEDICATIONS:  New Prescriptions    No medications on file       DISCONTINUED MEDICATIONS:  Discontinued Medications    No medications on file              (Please note that portions of this note were completed with a voice recognition program.  Efforts were made to edit the dictations but occasionally words are mis-transcribed.)    Rosemary Howell PA-C (electronically signed)           Jose Urias PA-C  03/10/22 [address] [facility], [place] [address]  03/10/22 1349

## 2022-03-10 NOTE — ED PROVIDER NOTES
In addition to the advanced practice provider, I personally saw Chris Vargas and performed a substantive portion of the visit including all aspects of the medical decision making. Medical Decision Making [id number]  Patient presented to the emergency department after developing a tightness to her chest, rapid breathing, lightheadedness, cramping in her hands and feet anxiety. She has had similar symptoms recently and has been seen by cardiology as well as her primary care physician. Hydroxyzine she has been prescribed is not helping. She believes this may be secondary to anxiety, but she is not sure as the symptoms are vascular in nature. For the time I evaluated her, she is doing much better. Her work-up is unremarkable. Troponin, EKG, CBC, BMP, chest x-ray were all normal.  Vital signs are also normal chills, much better. He had a thorough discussion regarding life stressors, depression, anxiety. I advised her to follow-up with a psychiatrist to determine if she needs long-term, daily treatment for her symptoms rather than an as-needed anxiety medication      EKG  The Ekg interpreted by me in the absence of a cardiologist shows. sinus bradycardia, rate=55  Axis is   Normal  QTc is  normal  Intervals and Durations are unremarkable. No specific ST-T wave changes appreciated. No evidence of acute ischemia. No significant change from prior EKG dated 3/8/2022      Patient Referrals:  [facility] Emergency Department  [address] [facility]  [phone]  Go to   If symptoms worsen    Rachna Rust MD  [address] [place] [address]  [phone]    Schedule an appointment as soon as possible for a visit         Discharge Medications:  Discharge Medication List as of 3/10/2022  5:35 PM          FINAL IMPRESSION  1. Chest tightness    2. Anxiety state    3. Panic attack    4. Lightheadedness        Blood pressure 118/75, pulse 59, temperature 98 °F (36.7 °C), resp.  rate 16, SpO2 100 %, not currently breastfeeding.      For further details of [facility] emergency department encounter, please see documentation by advanced practice provider, DOUGLAS Polk.       Sudhakar Hernandez MD  03/16/22 1983

## 2022-03-10 NOTE — ED NOTES
Arrived by [address] [facility] from [facility] rt call for illness; pt reports feeling dizzy, clammy & nervous. Denies hx of anxiety or exposure to illness. Monitors in place.        Zoran Spence RN  03/10/22 8179

## 2022-03-10 NOTE — ED NOTES
Discharge instructions received & reviewed with . Denies questions.   VSS & alert at this time       Nura Nugent RN  03/10/22 8133

## 2022-03-11 LAB
EKG ATRIAL RATE: 55 BPM
EKG DIAGNOSIS: NORMAL
EKG P AXIS: 53 DEGREES
EKG P-R INTERVAL: 194 MS
EKG Q-T INTERVAL: 424 MS
EKG QRS DURATION: 80 MS
EKG QTC CALCULATION (BAZETT): 405 MS
EKG R AXIS: 26 DEGREES
EKG T AXIS: 30 DEGREES
EKG VENTRICULAR RATE: 55 BPM

## 2022-03-11 PROCEDURE — 93010 ELECTROCARDIOGRAM REPORT: CPT | Performed by: INTERNAL MEDICINE

## 2022-03-19 ENCOUNTER — APPOINTMENT (OUTPATIENT)
Dept: GENERAL RADIOLOGY | Age: 67
End: 2022-03-19
Payer: COMMERCIAL

## 2022-03-19 ENCOUNTER — HOSPITAL ENCOUNTER (EMERGENCY)
Age: 67
Discharge: HOME | End: 2022-03-19
Attending: EMERGENCY MEDICINE
Payer: COMMERCIAL

## 2022-03-19 VITALS
RESPIRATION RATE: 16 BRPM | TEMPERATURE: 98.2 F | BODY MASS INDEX: 34.04 KG/M2 | WEIGHT: 185 LBS | DIASTOLIC BLOOD PRESSURE: 68 MMHG | HEIGHT: 62 IN | OXYGEN SATURATION: 98 % | HEART RATE: 61 BPM | SYSTOLIC BLOOD PRESSURE: 137 MMHG

## 2022-03-19 DIAGNOSIS — R25.2 CRAMPING OF HANDS: Primary | ICD-10-CM

## 2022-03-19 LAB
A/G RATIO: 1.2 (ref 1.1–2.2)
ALBUMIN SERPL-MCNC: 4.3 G/DL (ref 3.4–5)
ALP BLD-CCNC: 94 U/L (ref 40–129)
ALT SERPL-CCNC: 8 U/L (ref 10–40)
ANION GAP SERPL CALCULATED.3IONS-SCNC: 10 MMOL/L (ref 3–16)
AST SERPL-CCNC: 23 U/L (ref 15–37)
BASOPHILS ABSOLUTE: 0 K/UL (ref 0–0.2)
BASOPHILS RELATIVE PERCENT: 0.7 %
BILIRUB SERPL-MCNC: <0.2 MG/DL (ref 0–1)
BUN BLDV-MCNC: 17 MG/DL (ref 7–20)
CALCIUM SERPL-MCNC: 9.6 MG/DL (ref 8.3–10.6)
CHLORIDE BLD-SCNC: 103 MMOL/L (ref 99–110)
CO2: 26 MMOL/L (ref 21–32)
CREAT SERPL-MCNC: 0.6 MG/DL (ref 0.6–1.2)
EOSINOPHILS ABSOLUTE: 0.1 K/UL (ref 0–0.6)
EOSINOPHILS RELATIVE PERCENT: 1.2 %
GFR AFRICAN AMERICAN: >60
GFR NON-AFRICAN AMERICAN: >60
GLUCOSE BLD-MCNC: 101 MG/DL (ref 70–99)
HCT VFR BLD CALC: 41.5 % (ref 36–48)
HEMOGLOBIN: 13.6 G/DL (ref 12–16)
LYMPHOCYTES ABSOLUTE: 2.2 K/UL (ref 1–5.1)
LYMPHOCYTES RELATIVE PERCENT: 34.7 %
MCH RBC QN AUTO: 27.7 PG (ref 26–34)
MCHC RBC AUTO-ENTMCNC: 32.8 G/DL (ref 31–36)
MCV RBC AUTO: 84.4 FL (ref 80–100)
MONOCYTES ABSOLUTE: 0.5 K/UL (ref 0–1.3)
MONOCYTES RELATIVE PERCENT: 7.5 %
NEUTROPHILS ABSOLUTE: 3.6 K/UL (ref 1.7–7.7)
NEUTROPHILS RELATIVE PERCENT: 55.9 %
PDW BLD-RTO: 15.1 % (ref 12.4–15.4)
PLATELET # BLD: 216 K/UL (ref 135–450)
PMV BLD AUTO: 9.5 FL (ref 5–10.5)
POTASSIUM SERPL-SCNC: 4.1 MMOL/L (ref 3.5–5.1)
RBC # BLD: 4.91 M/UL (ref 4–5.2)
SODIUM BLD-SCNC: 139 MMOL/L (ref 136–145)
TOTAL PROTEIN: 7.8 G/DL (ref 6.4–8.2)
WBC # BLD: 6.4 K/UL (ref 4–11)

## 2022-03-19 PROCEDURE — 71046 X-RAY EXAM CHEST 2 VIEWS: CPT

## 2022-03-19 PROCEDURE — 80053 COMPREHEN METABOLIC PANEL: CPT

## 2022-03-19 PROCEDURE — 93005 ELECTROCARDIOGRAM TRACING: CPT | Performed by: PHYSICIAN ASSISTANT

## 2022-03-19 PROCEDURE — 6360000002 HC RX W HCPCS: Performed by: PHYSICIAN ASSISTANT

## 2022-03-19 PROCEDURE — 99283 EMERGENCY DEPT VISIT LOW MDM: CPT

## 2022-03-19 PROCEDURE — 36415 COLL VENOUS BLD VENIPUNCTURE: CPT

## 2022-03-19 PROCEDURE — 96372 THER/PROPH/DIAG INJ SC/IM: CPT

## 2022-03-19 PROCEDURE — 85025 COMPLETE CBC W/AUTO DIFF WBC: CPT

## 2022-03-19 RX ORDER — HYDROXYZINE HYDROCHLORIDE 50 MG/ML
50 INJECTION, SOLUTION INTRAMUSCULAR ONCE
Status: COMPLETED | OUTPATIENT
Start: 2022-03-19 | End: 2022-03-19

## 2022-03-19 RX ADMIN — HYDROXYZINE HYDROCHLORIDE 50 MG: 50 INJECTION, SOLUTION INTRAMUSCULAR at 22:45

## 2022-03-20 LAB
EKG ATRIAL RATE: 54 BPM
EKG DIAGNOSIS: NORMAL
EKG P AXIS: 60 DEGREES
EKG P-R INTERVAL: 212 MS
EKG Q-T INTERVAL: 426 MS
EKG QRS DURATION: 78 MS
EKG QTC CALCULATION (BAZETT): 403 MS
EKG R AXIS: 24 DEGREES
EKG T AXIS: 32 DEGREES
EKG VENTRICULAR RATE: 54 BPM

## 2022-03-20 PROCEDURE — 93010 ELECTROCARDIOGRAM REPORT: CPT | Performed by: INTERNAL MEDICINE

## 2022-03-20 ASSESSMENT — ENCOUNTER SYMPTOMS
DIARRHEA: 0
COUGH: 0
RHINORRHEA: 0
VOMITING: 0
SHORTNESS OF BREATH: 0
NAUSEA: 0
ABDOMINAL PAIN: 0

## 2022-03-20 NOTE — ED PROVIDER NOTES
[address] [facility]        Pt Name: Isaac Lawson  MRN: [id number]  [place] 1955  Date of evaluation: 3/19/2022  Provider: Karlos Hamilton PA-C  PCP: Susna Villela MD  Note Started: 12:56 AM EDT        I have seen and evaluated this patient with my supervising physician No att. providers found. CHIEF COMPLAINT       Chief Complaint   Patient presents with    Dizziness     Dizziness for over a week, left side hand cramping and burning sensation about left side of lip. New symptoms happened about 1 hour ago. Pt states she has been feeling nervous       HISTORY OF PRESENT ILLNESS   (Location, Timing/Onset, Context/Setting, Quality, Duration, Modifying Factors, Severity, Associated Signs and Symptoms)  Note limiting factors. The patient's preferred language is Haitian, language lines are used for interpretation,  #[id number]    Chief Complaint: hand cramping    Isaac Lawson is a 73 y.o. female who presents to the emergency department today for evaluation for bilateral hand cramping. The patient states that she has been feeling very anxious over the past week to week and a half. The patient was actually seen in the emergency room on 3/10/2022, for similar symptoms. She had negative work-up at that time. When asked if the patient is dizzy, she states that she is not dizzy or lightheaded at this time, she states that she was dizzy at her previous visit but again is not dizzy at this time. The patient states that she had a cramping to both of her hands, earlier today, however she states that the cramping is essentially resolved in the right hand but does remain in the left hand. The patient denies any pain to the hand. She has no numbness, tingling or weakness she states that she is simply here because she is cramping. Patient has no headaches. No visual changes. She has no chest pain or shortness of breath.   She has no abdominal pain. She denies any recent illnesses including fever, cough, vomiting or diarrhea. She denies any urinary symptoms. The patient states that she does have a history of//anxiety and she has been under a lot of stress recently. The patient states that she does have a history of panic attacks, and she states that her symptoms today are similar to panic attacks that she has had in the past    Nursing Notes were all reviewed and agreed with or any disagreements were addressed in the HPI. REVIEW OF SYSTEMS    (2-9 systems for level 4, 10 or more for level 5)     Review of Systems   Constitutional: Negative for activity change, appetite change, chills and fever. HENT: Negative for congestion and rhinorrhea. Eyes: Negative for visual disturbance. Respiratory: Negative for cough and shortness of breath. Cardiovascular: Negative for chest pain. Gastrointestinal: Negative for abdominal pain, diarrhea, nausea and vomiting. Genitourinary: Negative for difficulty urinating, dysuria and hematuria. Musculoskeletal: Positive for myalgias. Neurological: Negative for weakness and numbness. Psychiatric/Behavioral: The patient is nervous/anxious. Positives and Pertinent negatives as per HPI. Except as noted above in the ROS, all other systems were reviewed and negative.        PAST MEDICAL HISTORY     Past Medical History:   Diagnosis Date    Hypertension          SURGICAL HISTORY     Past Surgical History:   Procedure Laterality Date    BUNIONECTOMY      COLONOSCOPY N/A 9/6/2019    COLONOSCOPY performed by Suzanne Ferrara MD at [facility]  4/2/12    Dilatation and curetage, hysteroscopy, endometrial biopsy    DILATION AND CURETTAGE OF UTERUS  03/09/2017    HYSTEROSCOPY DILATATION AND CURETTAGE MYOSURE    HYSTEROSCOPY      HYSTEROSCOPY N/A 08/16/2018    HYSTEROSCOPY DILATATION AND CURETTAGE MYOSURE    UPPER GASTROINTESTINAL ENDOSCOPY N/A 3/1/2021    EGD BIOPSY performed by Lukasz Dill MD at [id number]       Discharge Medication List as of 3/19/2022 11:14 PM      CONTINUE these medications which have NOT CHANGED    Details   diclofenac (VOLTAREN) 50 MG EC tablet Historical Med      meclizine (ANTIVERT) 25 MG tablet TAKE 1 TABLET BY MOUTH THREE TIMES DAILY AS NEEDED FOR DIZZINESSHistorical Med      atorvastatin (LIPITOR) 40 MG tablet Take 1 tablet by mouth daily, Disp-90 tablet, R-3Normal      omeprazole (PRILOSEC) 20 MG delayed release capsule Take 20 mg by mouth daily Historical Med      Skin Protectants, Misc. (EUCERIN) cream Apply topically as needed. , Disp-1 Package, R-0, Normal      aspirin 81 MG chewable tablet 81 mg dailyHistorical Med      hydrOXYzine (ATARAX) 25 MG tablet Take 25 mg by mouth 3 times daily as needed for Itching Historical Med      ibuprofen (ADVIL;MOTRIN) 400 MG tablet Take 400 mg by mouth every 6 hours as needed for Pain Historical Med               ALLERGIES     Patient has no known allergies. FAMILYHISTORY       Family History   Problem Relation Age of Onset    Diabetes Sister           SOCIAL HISTORY       Social History     Tobacco Use    Smoking status: Never Smoker    Smokeless tobacco: Never Used   Vaping Use    Vaping Use: Never used   Substance Use Topics    Alcohol use: No    Drug use: No     Frequency: 3.0 times per week       SCREENINGS    [place] Coma Scale  Eye Opening: Spontaneous  Best Verbal Response: Oriented  Best Motor Response: Obeys commands  Geraldine Coma Scale Score: 15        PHYSICAL EXAM    (up to 7 for level 4, 8 or more for level 5)     ED Triage Vitals   BP Temp Temp src Pulse Resp SpO2 Height Weight   03/19/22 2134 03/19/22 2134 -- 03/19/22 2134 03/19/22 2134 03/19/22 2134 03/19/22 2137 03/19/22 2137   137/68 98.2 °F (36.8 °C)  61 16 98 % 5' 2\" (1.575 m) 185 lb (83.9 kg)       Physical Exam  Vitals and nursing note reviewed.    Constitutional: Appearance: She is well-developed. She is not diaphoretic. HENT:      Head: Normocephalic and atraumatic. Right Ear: External ear normal.      Left Ear: External ear normal.      Nose: Nose normal.      Mouth/Throat:      Mouth: Mucous membranes are moist.      Pharynx: Oropharynx is clear. No posterior oropharyngeal erythema. Eyes:      General: No visual field deficit. Right eye: No discharge. Left eye: No discharge. Extraocular Movements: Extraocular movements intact. Conjunctiva/sclera: Conjunctivae normal.      Pupils: Pupils are equal, round, and reactive to light. Neck:      Trachea: No tracheal deviation. Cardiovascular:      Rate and Rhythm: Normal rate and regular rhythm. Heart sounds: No murmur heard. Pulmonary:      Effort: Pulmonary effort is normal. No respiratory distress. Breath sounds: Normal breath sounds. No wheezing or rales. Abdominal:      General: Bowel sounds are normal. There is no distension. Palpations: Abdomen is soft. Tenderness: There is no abdominal tenderness. There is no guarding. Musculoskeletal:         General: Normal range of motion. Cervical back: Normal range of motion and neck supple. Skin:     General: Skin is warm and dry. Neurological:      Mental Status: She is alert and oriented to person, place, and time. GCS: GCS eye subscore is 4. GCS verbal subscore is 5. GCS motor subscore is 6. Cranial Nerves: Cranial nerves are intact. No cranial nerve deficit, dysarthria or facial asymmetry. Sensory: Sensation is intact. No sensory deficit. Motor: Motor function is intact. No weakness, tremor, atrophy, abnormal muscle tone, seizure activity or pronator drift. Coordination: Coordination is intact. Romberg sign negative. Coordination normal. Finger-Nose-Finger Test and Heel to [place] ryan Test normal. Rapid alternating movements normal.      Gait: Gait is intact.  Gait normal.   Psychiatric: Mood and Affect: Mood is anxious. Behavior: Behavior normal.         DIAGNOSTIC RESULTS   LABS:    Labs Reviewed   COMPREHENSIVE METABOLIC PANEL - Abnormal; Notable for the following components:       Result Value    Glucose 101 (*)     ALT 8 (*)     All other components within normal limits   CBC WITH AUTO DIFFERENTIAL       When ordered only abnormal lab results are displayed. All other labs were within normal range or not returned as of this dictation. EKG: When ordered, EKG's are interpreted by the Emergency Department Physician in the absence of a cardiologist.  Please see their note for interpretation of EKG. RADIOLOGY:   Non-plain film images such as CT, Ultrasound and MRI are read by the radiologist. Plain radiographic images are visualized and preliminarily interpreted by the ED Provider with the below findings:        Interpretation per the Radiologist below, if available at the time of this note:    XR CHEST (2 VW)   Final Result   Stable chest with no acute abnormality seen. XR CHEST (2 VW)    Result Date: 3/19/2022  EXAMINATION: TWO XRAY VIEWS OF THE CHEST 3/19/2022 10:29 pm COMPARISON: 03/10/2022 HISTORY: ORDERING SYSTEM PROVIDED HISTORY: Chest Discomfort TECHNOLOGIST PROVIDED HISTORY: Reason for exam:->Chest Discomfort Reason for Exam: chest discomfort FINDINGS: The heart is normal.  The pulmonary vessels are normal.  No consolidation or effusion is seen. The bones are intact. Stable chest with no acute abnormality seen.            PROCEDURES   Unless otherwise noted below, none     Procedures    CRITICAL CARE TIME       CONSULTS:  None      EMERGENCY DEPARTMENT COURSE and DIFFERENTIAL DIAGNOSIS/MDM:   Vitals:    Vitals:    03/19/22 2134 03/19/22 2137   BP: 137/68    Pulse: 61    Resp: 16    Temp: 98.2 °F (36.8 °C)    SpO2: 98%    Weight:  185 lb (83.9 kg)   Height:  5' 2\" (1.575 m)       Patient was given the following medications:  Medications   hydrOXYzine (VISTARIL) injection 50 mg (50 mg IntraMUSCular Given 3/19/22 4779)           Briefly, this is a 18-year-old female who presents to the emergency department today for evaluation for bilateral hand cramping. Patient was actually in the emergency room 9 days ago for similar symptoms. She reported feeling dizzy/lightheaded at that time although denies feeling dizzy or lightheaded now. Patient states that she had cramping to both of her hands, although the left remains today. It is not numb, tingling or weak, there is here for cramping. She does have a history of anxiety and she states that she does have panic attacks which have presented in a similar fashion. EKG is documented by my attending, please see her note for further details. CBC shows no evidence of leukocytosis. Her CMP is unremarkable. X-ray is negative      Patient was given Vistaril in the ED, and is overall feeling better, I feel that clinically her symptoms today are likely due to stress/anxiety. I recommend that she follow-up with her primary care physician within 2 to 3 days for reevaluation. She is to return to the ED for any new or worsening symptoms, the patient was understanding and is agreeable with plan. Stable for discharge    Patient does not feel dizzy, lightheaded or have any neurological complaints at this time. She has a NIH of 0 and a normal neurological examination. She denies any chest pain, or shortness of breath. And therefore I do not feel that she needs any further work-up at this time as I will otherwise not . .   Results for orders placed or performed during the hospital encounter of 03/19/22   CBC with Auto Differential   Result Value Ref Range    WBC 6.4 4.0 - 11.0 K/uL    RBC 4.91 4.00 - 5.20 M/uL    Hemoglobin 13.6 12.0 - 16.0 g/dL    Hematocrit 41.5 36.0 - 48.0 %    MCV 84.4 80.0 - 100.0 fL    MCH 27.7 26.0 - 34.0 pg    MCHC 32.8 31.0 - 36.0 g/dL    RDW 15.1 12.4 - 15.4 %    Platelets 877 830 - 450 K/uL    MPV 9.5 5.0 - 10.5 fL    Neutrophils % 55.9 %    Lymphocytes % 34.7 %    Monocytes % 7.5 %    Eosinophils % 1.2 %    Basophils % 0.7 %    Neutrophils Absolute 3.6 1.7 - 7.7 K/uL    Lymphocytes Absolute 2.2 1.0 - 5.1 K/uL    Monocytes Absolute 0.5 0.0 - 1.3 K/uL    Eosinophils Absolute 0.1 0.0 - 0.6 K/uL    Basophils Absolute 0.0 0.0 - 0.2 K/uL   Comprehensive Metabolic Panel   Result Value Ref Range    Sodium 139 136 - 145 mmol/L    Potassium 4.1 3.5 - 5.1 mmol/L    Chloride 103 99 - 110 mmol/L    CO2 26 21 - 32 mmol/L    Anion Gap 10 3 - 16    Glucose 101 (H) 70 - 99 mg/dL    BUN 17 7 - 20 mg/dL    CREATININE 0.6 0.6 - 1.2 mg/dL    GFR Non-African American >60 >60    GFR African American >60 >60    Calcium 9.6 8.3 - 10.6 mg/dL    Total Protein 7.8 6.4 - 8.2 g/dL    Albumin 4.3 3.4 - 5.0 g/dL    Albumin/Globulin Ratio 1.2 1.1 - 2.2    Total Bilirubin <0.2 0.0 - 1.0 mg/dL    Alkaline Phosphatase 94 40 - 129 U/L    ALT 8 (L) 10 - 40 U/L    AST 23 15 - 37 U/L       I estimate there is LOW risk for ACUTE CORONARY SYNDROME, INTRACRANIAL HEMORRHAGE, MALIGNANT DYSRHYTHMIA, MENINGITIS, PNEUMONIA, PULMONARY EMBOLISM, SEPSIS, SUBARACHNOID HEMORRHAGE, SUBDURAL HEMATOMA, STROKE, or URINARY TRACT INFECTION, thus I consider the discharge disposition reasonable. Kristen Lutz and I have discussed the diagnosis and risks, and we agree with discharging home to follow-up with their primary doctor. We also discussed returning to the Emergency Department immediately if new or worsening symptoms occur. We have discussed the symptoms which are most concerning (e.g., changing or worsening pain, weakness, vomiting, fever) that necessitate immediate return. FINAL IMPRESSION      1.  Cramping of hands          DISPOSITION/PLAN   DISPOSITION Decision To Discharge 03/19/2022 11:08:21 PM      PATIENT REFERRED TO:  Ziggy Romano MD  [place] [address] [address] [phone]    Schedule an appointment as soon as possible for a visit         DISCHARGE MEDICATIONS:  Discharge Medication List as of 3/19/2022 11:14 PM          DISCONTINUED MEDICATIONS:  Discharge Medication List as of 3/19/2022 11:14 PM                 (Please note that portions of this note were completed with a voice recognition program.  Efforts were made to edit the dictations but occasionally words are mis-transcribed.)    Leidy Hoyt PA-C (electronically signed)           Leidy Hoyt PA-C  03/20/22 0100

## 2022-03-20 NOTE — ED PROVIDER NOTES
I independently performed a history and physical on [address] [facility]. All diagnostic, treatment, and disposition decisions were made by myself in conjunction with the advanced practice provider. See ISRRAEL's note for complete documentation for this encounter. I reviewed pertinent nurse's notes, triage notes, vital signs, past medical history, family and social history, medications, and allergies. Complete review of systems was conducted by the mid-level provider and/or myself. Review of systems is negative except as documented in the history of present illness. EKG: Twelve-lead EKG as read and interpreted by myself shows: FINAL IMPRESSION     1. Cramping of hands            Electronically signed by: FREDDY Sweeney MD  03/19/22 4654

## 2022-03-20 NOTE — ED NOTES
Patient discharged in stable condition alert and oriented x4 at this time. Patient verbalized understanding and was discharged using .       Sharona Milian RN  03/19/22 0791

## 2022-05-31 ENCOUNTER — HOSPITAL ENCOUNTER (EMERGENCY)
Age: 67
Discharge: HOME | End: 2022-05-31
Payer: COMMERCIAL

## 2022-05-31 ENCOUNTER — APPOINTMENT (OUTPATIENT)
Dept: GENERAL RADIOLOGY | Age: 67
End: 2022-05-31
Payer: COMMERCIAL

## 2022-05-31 VITALS
DIASTOLIC BLOOD PRESSURE: 64 MMHG | RESPIRATION RATE: 16 BRPM | HEIGHT: 62 IN | WEIGHT: 174.8 LBS | TEMPERATURE: 98.5 F | HEART RATE: 61 BPM | SYSTOLIC BLOOD PRESSURE: 115 MMHG | BODY MASS INDEX: 32.17 KG/M2

## 2022-05-31 DIAGNOSIS — Z78.9 NON-ENGLISH SPEAKING PATIENT: ICD-10-CM

## 2022-05-31 DIAGNOSIS — S00.83XA CONTUSION OF JAW, INITIAL ENCOUNTER: Primary | ICD-10-CM

## 2022-05-31 DIAGNOSIS — Z78.9 LANGUAGE BARRIER AFFECTING HEALTH CARE: ICD-10-CM

## 2022-05-31 PROCEDURE — 70110 X-RAY EXAM OF JAW 4/> VIEWS: CPT

## 2022-05-31 PROCEDURE — 99283 EMERGENCY DEPT VISIT LOW MDM: CPT

## 2022-05-31 PROCEDURE — 6370000000 HC RX 637 (ALT 250 FOR IP): Performed by: NURSE PRACTITIONER

## 2022-05-31 RX ORDER — IBUPROFEN 600 MG/1
600 TABLET ORAL EVERY 6 HOURS PRN
Qty: 20 TABLET | Refills: 0 | Status: SHIPPED | OUTPATIENT
Start: 2022-05-31 | End: 2022-05-31 | Stop reason: SDUPTHER

## 2022-05-31 RX ORDER — IBUPROFEN 600 MG/1
600 TABLET ORAL EVERY 6 HOURS PRN
Qty: 20 TABLET | Refills: 0 | Status: SHIPPED | OUTPATIENT
Start: 2022-05-31

## 2022-05-31 RX ORDER — IBUPROFEN 600 MG/1
600 TABLET ORAL ONCE
Status: COMPLETED | OUTPATIENT
Start: 2022-05-31 | End: 2022-05-31

## 2022-05-31 RX ADMIN — IBUPROFEN 600 MG: 600 TABLET, FILM COATED ORAL at 16:51

## 2022-05-31 ASSESSMENT — VISUAL ACUITY: OU: 1

## 2022-05-31 ASSESSMENT — PAIN DESCRIPTION - LOCATION: LOCATION: JAW

## 2022-05-31 ASSESSMENT — PAIN SCALES - GENERAL: PAINLEVEL_OUTOF10: 3

## 2022-05-31 ASSESSMENT — PAIN DESCRIPTION - ORIENTATION: ORIENTATION: RIGHT

## 2022-05-31 ASSESSMENT — PAIN - FUNCTIONAL ASSESSMENT: PAIN_FUNCTIONAL_ASSESSMENT: 0-10

## 2022-05-31 NOTE — Clinical Note
Haily De La Torre was seen and treated in our emergency department on 5/31/2022. She may return to work on 06/02/2022. If you have any questions or concerns, please don't hesitate to call.       Leopoldo Colby, APRN - CNP

## 2022-05-31 NOTE — ED PROVIDER NOTES
[address] [facility]        Pt Name: Lili Barnett  MRN: [id number]  [place] 1955  Date of evaluation: 5/31/2022  Provider: DUANE Palacios CNP  PCP: No primary care provider on file. Note Started: 4:23 PM EDT       ISRRAEL. I have evaluated this patient. My supervising physician was available for consultation. CHIEF COMPLAINT       Chief Complaint   Patient presents with    Facial Injury     Injury at work 3 days ago to right side of face around the jaw and neck, industrial machine impacted side of face. (AMN #[id number] - Persian)       HISTORY OF PRESENT ILLNESS   (Location, Timing/Onset, Context/Setting, Quality, Duration, Modifying Factors, Severity, Associated Signs and Symptoms)  Note limiting factors. Chief Complaint: facial injury     Lili Barnett is a 73 y.o. female who presents with complaints of pain into her right jaw that occurred on  5/28. 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 She did not realize the machine press was on and she turned around and hit her right jaw on a wooden board. She initially had pain on Saturday when the incident occurred, however is improved. She informed her employer instructed to seek medical assessment because this was a work-related injury. She denies take anything today for treatment. She denies any difficulty opening her mouth, talking, swallowing, draining from the ear, tinnitus, rhinorrhea, sneezing, sore throat, rashes, fevers, bleeding or chewing. Denies any head trauma or LOC. Pt is nonenglish speaking,  # [id number] was used    Nursing Notes were all reviewed and agreed with or any disagreements were addressed in the HPI. REVIEW OF SYSTEMS    (2-9 systems for level 4, 10 or more for level 5)     Review of Systems    Positives and Pertinent negatives as per HPI.  Except as noted above in the ROS, all other systems were reviewed and negative. PAST MEDICAL HISTORY     Past Medical History:   Diagnosis Date    Hypertension          SURGICAL HISTORY     Past Surgical History:   Procedure Laterality Date    BUNIONECTOMY      COLONOSCOPY N/A 9/6/2019    COLONOSCOPY performed by Mackenzie Gardner MD at [facility]  4/2/12    Dilatation and curetage, hysteroscopy, endometrial biopsy    DILATION AND CURETTAGE OF UTERUS  03/09/2017    HYSTEROSCOPY DILATATION AND CURETTAGE MYOSURE    HYSTEROSCOPY      HYSTEROSCOPY N/A 08/16/2018    HYSTEROSCOPY DILATATION AND CURETTAGE MYOSURE    UPPER GASTROINTESTINAL ENDOSCOPY N/A 3/1/2021    [facility]       Discharge Medication List as of 5/31/2022  6:39 PM      CONTINUE these medications which have NOT CHANGED    Details   meclizine (ANTIVERT) 25 MG tablet TAKE 1 TABLET BY MOUTH THREE TIMES DAILY AS NEEDED FOR DIZZINESSHistorical Med      atorvastatin (LIPITOR) 40 MG tablet Take 1 tablet by mouth daily, Disp-90 tablet, R-3Normal      omeprazole (PRILOSEC) 20 MG delayed release capsule Take 20 mg by mouth daily Historical Med      Skin Protectants, Misc. (EUCERIN) cream Apply topically as needed. , Disp-1 Package, R-0, Normal      aspirin 81 MG chewable tablet 81 mg dailyHistorical Med      hydrOXYzine (ATARAX) 25 MG tablet Take 25 mg by mouth 3 times daily as needed for Itching Historical Med               ALLERGIES     Patient has no known allergies.     FAMILYHISTORY       Family History   Problem Relation Age of Onset    Diabetes Sister           SOCIAL HISTORY       Social History     Tobacco Use    Smoking status: Never Smoker    Smokeless tobacco: Never Used   Vaping Use    Vaping Use: Never used   Substance Use Topics    Alcohol use: No    Drug use: No     Frequency: 3.0 times per week       SCREENINGS    [place] Coma Scale  Eye Opening: Spontaneous  Best Verbal Response: Oriented  Best Motor Response: Obeys commands  Geraldine Coma Scale Score: 15        PHYSICAL EXAM    (up to 7 for level 4, 8 or more for level 5)     ED Triage Vitals [05/31/22 1624]   Enc Vitals Group      /64      Heart Rate 61      Resp 16      Temp 98.5 °F (36.9 °C)      Temp Source Oral      SpO2       Weight 174 lb 12.8 oz (79.3 kg)      Height 5' 2\" (1.575 m)         Physical Exam  Vitals and nursing note reviewed. Constitutional:       General: She is awake. Appearance: Normal appearance. She is well-developed. She is obese. HENT:      Head: Normocephalic and atraumatic. Jaw: There is normal jaw occlusion. Tenderness present. No trismus, swelling, pain on movement or malocclusion. Right Ear: Hearing, tympanic membrane, ear canal and external ear normal.      Left Ear: Hearing, tympanic membrane, ear canal and external ear normal.      Nose: Nose normal.      Right Sinus: No maxillary sinus tenderness or frontal sinus tenderness. Left Sinus: No maxillary sinus tenderness or frontal sinus tenderness. Mouth/Throat:      Mouth: Mucous membranes are moist.      Pharynx: Oropharynx is clear. Comments: No intraoral trauma noted, no angioedema noted. Eyes:      General: Vision grossly intact. Gaze aligned appropriately. Right eye: No discharge. Left eye: No discharge. Extraocular Movements: Extraocular movements intact. Conjunctiva/sclera: Conjunctivae normal.      Pupils: Pupils are equal, round, and reactive to light. Pulmonary:      Effort: Pulmonary effort is normal. No respiratory distress. Musculoskeletal:         General: Normal range of motion. Cervical back: Full passive range of motion without pain and normal range of motion. No spinous process tenderness or muscular tenderness. Skin:     General: Skin is warm and dry. Coloration: Skin is not pale.    Neurological:      Mental Status: She is & Imaging studies reviewed. (See chart for details)   -  Patient seen and evaluated in the emergency department. -  Triage and nursing notes reviewed and incorporated. -  Old chart records reviewed and incorporated. -  ISRRAEL. I have evaluated this patient. My supervising physician was available for consultation.  -  Differential diagnosis includes:  Cervical sprain, strain, fracture, dislocation, TMJ, contusion, laceration, abrasion, mastoiditis, AOM, EOM, sinusitis, versus COVID-19  -  Work-up included:  See above  -  ED treatment included:   Motrin, ice  -  Results discussed with patient. Ynes Franks is a 76-year-old female who sustained an injury to her right lateral mandible on Saturday. Patient works in a [employer] and was cleaning around a machine press. She turned around and struck her face against the machine while it was on. She did not seek treatment at that time. She told her employer today instructed her to go seek medical treatment. She denies any head trauma or LOC. She initially had pain upon palpation at the time and took 1 dose of Motrin. She has been able to eat, drink, chew, and talk without difficulty. On exam there is tenderness along the right lateral lower mandible region. No acute deformity noted. No tenderness to the TMJ. No intraoral trauma or trismus noted. No frontal or maxillary sinus noted. No epistaxis. Oropharynx is clear and moist.  No C spine bony tenderness or decreased range of motion noted. Lungs clear to auscultate bilateral.  Cardiac exam with regular rate and rhythm. X-ray of the mandible shows no acute osseous injury of the mandible. Patient was informed of these results instructed on home treatment and care.  #[id number] was used. She was instructed on home treatment and care. She was given strict return discharge instructions. She is requesting a work note.   Shared decision making is complete and patient stable for discharge at this time. FINAL IMPRESSION      1. Contusion of jaw, initial encounter    2. Non-English speaking patient    3.  Language barrier affecting health care          DISPOSITION/PLAN   DISPOSITION        PATIENT REFERRED TO:  [facility]) Pre-Services  [phone]  Call in 2 days  As needed, If symptoms worsen    [facility] Emergency Department  [address] [place] [address]  [phone]  Go to   As needed      DISCHARGE MEDICATIONS:  Discharge Medication List as of 5/31/2022  6:39 PM          DISCONTINUED MEDICATIONS:  Discharge Medication List as of 5/31/2022  6:39 PM                 (Please note that portions of this note were completed with a voice recognition program.  Efforts were made to edit the dictations but occasionally words are mis-transcribed.)    DUANE Pickens CNP (electronically signed)            DUANE Pickens CNP  05/31/22 1924

## 2022-07-14 ENCOUNTER — HOSPITAL ENCOUNTER (OUTPATIENT)
Age: 67
Discharge: HOME | End: 2022-07-14
Payer: COMMERCIAL

## 2022-07-14 LAB
A/G RATIO: 1.4 (ref 1.1–2.2)
ALBUMIN SERPL-MCNC: 4 G/DL (ref 3.4–5)
ALP BLD-CCNC: 96 U/L (ref 40–129)
ALT SERPL-CCNC: 9 U/L (ref 10–40)
ANION GAP SERPL CALCULATED.3IONS-SCNC: 6 MMOL/L (ref 3–16)
AST SERPL-CCNC: 20 U/L (ref 15–37)
BASOPHILS ABSOLUTE: 0 K/UL (ref 0–0.2)
BASOPHILS RELATIVE PERCENT: 0.7 %
BILIRUB SERPL-MCNC: 0.4 MG/DL (ref 0–1)
BUN BLDV-MCNC: 11 MG/DL (ref 7–20)
CALCIUM SERPL-MCNC: 9.5 MG/DL (ref 8.3–10.6)
CHLORIDE BLD-SCNC: 105 MMOL/L (ref 99–110)
CHOLESTEROL, TOTAL: 166 MG/DL (ref 0–199)
CO2: 31 MMOL/L (ref 21–32)
CREAT SERPL-MCNC: 0.7 MG/DL (ref 0.6–1.2)
EOSINOPHILS ABSOLUTE: 0.1 K/UL (ref 0–0.6)
EOSINOPHILS RELATIVE PERCENT: 2.6 %
ESTIMATED AVERAGE GLUCOSE: 116.9 MG/DL
GFR AFRICAN AMERICAN: >60
GFR NON-AFRICAN AMERICAN: >60
GLUCOSE BLD-MCNC: 94 MG/DL (ref 70–99)
HBA1C MFR BLD: 5.7 %
HCT VFR BLD CALC: 37.5 % (ref 36–48)
HDLC SERPL-MCNC: 57 MG/DL (ref 40–60)
HEMOGLOBIN: 12.4 G/DL (ref 12–16)
LDL CHOLESTEROL CALCULATED: 97 MG/DL
LYMPHOCYTES ABSOLUTE: 2.1 K/UL (ref 1–5.1)
LYMPHOCYTES RELATIVE PERCENT: 44.6 %
MCH RBC QN AUTO: 27.8 PG (ref 26–34)
MCHC RBC AUTO-ENTMCNC: 33 G/DL (ref 31–36)
MCV RBC AUTO: 84.4 FL (ref 80–100)
MONOCYTES ABSOLUTE: 0.4 K/UL (ref 0–1.3)
MONOCYTES RELATIVE PERCENT: 9.4 %
NEUTROPHILS ABSOLUTE: 2 K/UL (ref 1.7–7.7)
NEUTROPHILS RELATIVE PERCENT: 42.7 %
PDW BLD-RTO: 15.3 % (ref 12.4–15.4)
PLATELET # BLD: 225 K/UL (ref 135–450)
PMV BLD AUTO: 8.7 FL (ref 5–10.5)
POTASSIUM SERPL-SCNC: 4.9 MMOL/L (ref 3.5–5.1)
RBC # BLD: 4.44 M/UL (ref 4–5.2)
SODIUM BLD-SCNC: 142 MMOL/L (ref 136–145)
TOTAL PROTEIN: 6.8 G/DL (ref 6.4–8.2)
TRIGL SERPL-MCNC: 61 MG/DL (ref 0–150)
TSH REFLEX: 2.58 UIU/ML (ref 0.27–4.2)
VLDLC SERPL CALC-MCNC: 12 MG/DL
WBC # BLD: 4.6 K/UL (ref 4–11)

## 2022-07-14 PROCEDURE — 85025 COMPLETE CBC W/AUTO DIFF WBC: CPT

## 2022-07-14 PROCEDURE — 80061 LIPID PANEL: CPT

## 2022-07-14 PROCEDURE — 84443 ASSAY THYROID STIM HORMONE: CPT

## 2022-07-14 PROCEDURE — 36415 COLL VENOUS BLD VENIPUNCTURE: CPT

## 2022-07-14 PROCEDURE — 80053 COMPREHEN METABOLIC PANEL: CPT

## 2022-07-14 PROCEDURE — 83036 HEMOGLOBIN GLYCOSYLATED A1C: CPT

## 2022-07-20 ENCOUNTER — APPOINTMENT (OUTPATIENT)
Dept: GENERAL RADIOLOGY | Age: 67
End: 2022-07-20
Payer: COMMERCIAL

## 2022-07-20 ENCOUNTER — HOSPITAL ENCOUNTER (EMERGENCY)
Age: 67
Discharge: HOME | End: 2022-07-20
Payer: COMMERCIAL

## 2022-07-20 ENCOUNTER — APPOINTMENT (OUTPATIENT)
Dept: CT IMAGING | Age: 67
End: 2022-07-20
Payer: COMMERCIAL

## 2022-07-20 VITALS
HEART RATE: 73 BPM | DIASTOLIC BLOOD PRESSURE: 82 MMHG | WEIGHT: 175 LBS | SYSTOLIC BLOOD PRESSURE: 124 MMHG | RESPIRATION RATE: 18 BRPM | BODY MASS INDEX: 32.2 KG/M2 | TEMPERATURE: 98 F | OXYGEN SATURATION: 96 % | HEIGHT: 62 IN

## 2022-07-20 DIAGNOSIS — S76.011A MUSCLE STRAIN OF RIGHT GLUTEAL REGION, INITIAL ENCOUNTER: Primary | ICD-10-CM

## 2022-07-20 LAB
A/G RATIO: 1.9 (ref 1.1–2.2)
ALBUMIN SERPL-MCNC: 4.4 G/DL (ref 3.4–5)
ALP BLD-CCNC: 102 U/L (ref 40–129)
ALT SERPL-CCNC: 18 U/L (ref 10–40)
ANION GAP SERPL CALCULATED.3IONS-SCNC: 4 MMOL/L (ref 3–16)
AST SERPL-CCNC: 42 U/L (ref 15–37)
BASOPHILS ABSOLUTE: 0.1 K/UL (ref 0–0.2)
BASOPHILS RELATIVE PERCENT: 1 %
BILIRUB SERPL-MCNC: <0.2 MG/DL (ref 0–1)
BUN BLDV-MCNC: 15 MG/DL (ref 7–20)
CALCIUM SERPL-MCNC: 9.4 MG/DL (ref 8.3–10.6)
CHLORIDE BLD-SCNC: 105 MMOL/L (ref 99–110)
CO2: 33 MMOL/L (ref 21–32)
CREAT SERPL-MCNC: 0.7 MG/DL (ref 0.6–1.2)
EOSINOPHILS ABSOLUTE: 0.1 K/UL (ref 0–0.6)
EOSINOPHILS RELATIVE PERCENT: 2.2 %
GFR AFRICAN AMERICAN: >60
GFR NON-AFRICAN AMERICAN: >60
GLUCOSE BLD-MCNC: 106 MG/DL (ref 70–99)
HCT VFR BLD CALC: 36.9 % (ref 36–48)
HEMOGLOBIN: 12 G/DL (ref 12–16)
INR BLD: 0.92 (ref 0.87–1.14)
LYMPHOCYTES ABSOLUTE: 2.3 K/UL (ref 1–5.1)
LYMPHOCYTES RELATIVE PERCENT: 38.4 %
MCH RBC QN AUTO: 27.6 PG (ref 26–34)
MCHC RBC AUTO-ENTMCNC: 32.6 G/DL (ref 31–36)
MCV RBC AUTO: 84.6 FL (ref 80–100)
MONOCYTES ABSOLUTE: 0.5 K/UL (ref 0–1.3)
MONOCYTES RELATIVE PERCENT: 8.7 %
NEUTROPHILS ABSOLUTE: 3 K/UL (ref 1.7–7.7)
NEUTROPHILS RELATIVE PERCENT: 49.7 %
PDW BLD-RTO: 15.7 % (ref 12.4–15.4)
PLATELET # BLD: 258 K/UL (ref 135–450)
PMV BLD AUTO: 8.8 FL (ref 5–10.5)
POTASSIUM SERPL-SCNC: 4.9 MMOL/L (ref 3.5–5.1)
PROTHROMBIN TIME: 12.3 SEC (ref 11.7–14.5)
RBC # BLD: 4.36 M/UL (ref 4–5.2)
SODIUM BLD-SCNC: 142 MMOL/L (ref 136–145)
TOTAL PROTEIN: 6.7 G/DL (ref 6.4–8.2)
WBC # BLD: 6 K/UL (ref 4–11)

## 2022-07-20 PROCEDURE — 73700 CT LOWER EXTREMITY W/O DYE: CPT

## 2022-07-20 PROCEDURE — 85025 COMPLETE CBC W/AUTO DIFF WBC: CPT

## 2022-07-20 PROCEDURE — 6370000000 HC RX 637 (ALT 250 FOR IP): Performed by: NURSE PRACTITIONER

## 2022-07-20 PROCEDURE — 80053 COMPREHEN METABOLIC PANEL: CPT

## 2022-07-20 PROCEDURE — 99284 EMERGENCY DEPT VISIT MOD MDM: CPT

## 2022-07-20 PROCEDURE — 85610 PROTHROMBIN TIME: CPT

## 2022-07-20 PROCEDURE — 73502 X-RAY EXAM HIP UNI 2-3 VIEWS: CPT

## 2022-07-20 RX ORDER — ACETAMINOPHEN 500 MG
1000 TABLET ORAL ONCE
Status: COMPLETED | OUTPATIENT
Start: 2022-07-20 | End: 2022-07-20

## 2022-07-20 RX ADMIN — ACETAMINOPHEN 1000 MG: 500 TABLET ORAL at 19:43

## 2022-07-20 ASSESSMENT — PAIN - FUNCTIONAL ASSESSMENT: PAIN_FUNCTIONAL_ASSESSMENT: 0-10

## 2022-07-20 ASSESSMENT — ENCOUNTER SYMPTOMS
NAUSEA: 0
ABDOMINAL PAIN: 0
VOMITING: 0
CHEST TIGHTNESS: 0
DIARRHEA: 0
SHORTNESS OF BREATH: 0

## 2022-07-20 NOTE — LETTER
[facility]  [address], [place], [address]             July 20, 2022    Patient: Deidre Jacob   YOB: 1955   Date of Visit: 7/20/2022       To Whom It May Concern:    Tiffanie Leblanc was seen and treated in our emergency department on 7/20/2022. She may return to work on 7/21/2022, patient needs to remain on light duty until 7/24/2022. Monica Wyatt       Sincerely,         [facility]

## 2022-07-21 NOTE — ED PROVIDER NOTES
[facility]        Pt Name: Una Pelletier  MRN: [id number]  [place] 1955  Date of evaluation: 7/20/2022  Provider: DUANE Webb CNP  PCP: DUANE Garcia CNP  Note Started: 8:10 PM EDT       ISRRAEL. I have evaluated this patient. My supervising physician was available for consultation. CHIEF COMPLAINT       Chief Complaint   Patient presents with    Hip Pain     Pt with left hip pain, bruise noted, happened Sunday felt something pop before bruise appeared, no known injury       HISTORY OF PRESENT ILLNESS   (Location, Timing/Onset, Context/Setting, Quality, Duration, Modifying Factors, Severity, Associated Signs and Symptoms)  Note limiting factors. Chief Complaint: right hip pain and bruising     Una Pelletier is a 73 y.o. female who presents to the emergency department with complaint of right hip pain and bruising. The patient reports that Sunday she leaned forward to perform a light task when she felt something \"pop\" in the right hip and had immediate bruising to the right upper leg. States that she is walking with a limp and does have pain present at rest.  There is no injury or trauma. Denies history of previous event. She reports that she has been taking diclofenac twice daily for the past 2 days since time of pain onset without significant relief. Denies any headache, fever, lightheadedness, dizziness, visual disturbances. No chest pain or pressure. No neck or back pain. No shortness of breath, cough, or congestion. No abdominal pain, nausea, vomiting, diarrhea, constipation, or dysuria. No rash. Nursing Notes were all reviewed and agreed with or any disagreements were addressed in the HPI. REVIEW OF SYSTEMS    (2-9 systems for level 4, 10 or more for level 5)     Review of Systems   Constitutional:  Negative for activity change, chills and fever.    Respiratory:  Negative for chest tightness and shortness of breath. Cardiovascular:  Negative for chest pain. Gastrointestinal:  Negative for abdominal pain, diarrhea, nausea and vomiting. Genitourinary:  Negative for dysuria. Musculoskeletal:         Right hip pain with bruising   All other systems reviewed and are negative. Positives and Pertinent negatives as per HPI. Except as noted above in the ROS, all other systems were reviewed and negative. PAST MEDICAL HISTORY     Past Medical History:   Diagnosis Date    Hypertension          SURGICAL HISTORY     Past Surgical History:   Procedure Laterality Date    BUNIONECTOMY      COLONOSCOPY N/A 9/6/2019    COLONOSCOPY performed by Fransico Villalta MD at [address]  4/2/12    Dilatation and curetage, hysteroscopy, endometrial biopsy    DILATION AND CURETTAGE OF UTERUS  03/09/2017    HYSTEROSCOPY DILATATION AND CURETTAGE MYOSURE    HYSTEROSCOPY      HYSTEROSCOPY N/A 08/16/2018    HYSTEROSCOPY DILATATION AND CURETTAGE MYOSURE    UPPER GASTROINTESTINAL ENDOSCOPY N/A 3/1/2021    EGD BIOPSY performed by Fransico Villalta MD at [address]       Discharge Medication List as of 7/20/2022  9:31 PM        CONTINUE these medications which have NOT CHANGED    Details   ibuprofen (ADVIL;MOTRIN) 600 MG tablet Take 1 tablet by mouth every 6 hours as needed for Pain, Disp-20 tablet, R-0Normal      aspirin 81 MG chewable tablet 81 mg dailyHistorical Med               ALLERGIES     Patient has no known allergies.     FAMILYHISTORY       Family History   Problem Relation Age of Onset    Diabetes Sister           SOCIAL HISTORY       Social History     Tobacco Use    Smoking status: Never    Smokeless tobacco: Never   Vaping Use    Vaping Use: Never used   Substance Use Topics    Alcohol use: No    Drug use: No     Frequency: 3.0 times per week       SCREENINGS    Geraldine Coma Scale  Eye Opening: Spontaneous  Best Verbal Response: Oriented  Best Motor Response: Obeys commands  Geraldine Coma Scale Score: 15        PHYSICAL EXAM    (up to 7 for level 4, 8 or more for level 5)     ED Triage Vitals [07/20/22 1917]   BP Temp Temp Source Heart Rate Resp SpO2 Height Weight   124/82 98 °F (36.7 °C) Oral 73 18 96 % 5' 2\" (1.575 m) 175 lb (79.4 kg)       Physical Exam  Vitals and nursing note reviewed. Constitutional:       Appearance: She is well-developed. She is not diaphoretic. HENT:      Head: Normocephalic and atraumatic. Right Ear: External ear normal.      Left Ear: External ear normal.   Eyes:      General:         Right eye: No discharge. Left eye: No discharge. Neck:      Vascular: No JVD. Cardiovascular:      Rate and Rhythm: Normal rate and regular rhythm. Pulses: Normal pulses. Heart sounds: Normal heart sounds. Pulmonary:      Effort: Pulmonary effort is normal. No respiratory distress. Breath sounds: Normal breath sounds. Abdominal:      Palpations: Abdomen is soft. Musculoskeletal:         General: Normal range of motion. Legs:    Skin:     General: Skin is warm and dry. Coloration: Skin is not pale. Neurological:      Mental Status: She is alert. Psychiatric:         Behavior: Behavior normal.       DIAGNOSTIC RESULTS   LABS:    Labs Reviewed   CBC WITH AUTO DIFFERENTIAL - Abnormal; Notable for the following components:       Result Value    RDW 15.7 (*)     All other components within normal limits   COMPREHENSIVE METABOLIC PANEL - Abnormal; Notable for the following components:    CO2 33 (*)     Glucose 106 (*)     AST 42 (*)     All other components within normal limits   PROTIME-INR       When ordered only abnormal lab results are displayed. All other labs were within normal range or not returned as of this dictation. EKG:  When ordered, EKG's are interpreted by the Emergency Department Physician in the absence of a cardiologist.  Please see their note for interpretation of EKG. RADIOLOGY:   Non-plain film images such as CT, Ultrasound and MRI are read by the radiologist. Plain radiographic images are visualized and preliminarily interpreted by the ED Provider with the below findings:        Interpretation per the Radiologist below, if available at the time of this note:    CT HIP RIGHT WO CONTRAST   Final Result   1. No acute fracture identified. 2. Intramuscular edema and stranding along the right gluteus musculature,   proximal iliotibial tract and right lateral hip consistent with acute   muscular strains and likely injury of the proximal iliotibial tract with   associated soft tissue contusion. XR HIP RIGHT (2-3 VIEWS)   Final Result   No fracture or dislocation. No results found. PROCEDURES   Unless otherwise noted below, none     Procedures    CRITICAL CARE TIME       CONSULTS:  None      EMERGENCY DEPARTMENT COURSE and DIFFERENTIAL DIAGNOSIS/MDM:   Vitals:    Vitals:    07/20/22 1917   BP: 124/82   Pulse: 73   Resp: 18   Temp: 98 °F (36.7 °C)   TempSrc: Oral   SpO2: 96%   Weight: 175 lb (79.4 kg)   Height: 5' 2\" (1.575 m)       Patient was given the following medications:  Medications   acetaminophen (TYLENOL) tablet 1,000 mg (1,000 mg Oral Given 7/20/22 1943)         Is this patient to be included in the SEP-1 Core Measure due to severe sepsis or septic shock? No   Exclusion criteria - the patient is NOT to be included for SEP-1 Core Measure due to: Infection is not suspected    Briefly, this is a 59-year-old female with medical history including GERD and chest pain that presents to the emergency department with complaint of nontraumatic right hip pain with bruising while performing a light task on Sunday. Pain along the right gluteus/hip with ecchymosis noted to the right lateral thigh. X-ray was unremarkable, patient does have exquisite pain and some difficulty walking. Labs unremarkable.     CT HIP RIGHT WO CONTRAST (Final result)  Result time 07/20/22 21:12:20  Final result by Dann Pascal MD (07/20/22 21:12:20)                Impression:    1. No acute fracture identified. 2. Intramuscular edema and stranding along the right gluteus musculature,   proximal iliotibial tract and right lateral hip consistent with acute   muscular strains and likely injury of the proximal iliotibial tract with   associated soft tissue contusion. Patient instructed to continue her diclofenac and follow-up with primary care as necessary. Rest and ice.  used throughout visit. I estimate there is LOW risk for FRACTURE, COMPARTMENT SYNDROME, DEEP VENOUS THROMBOSIS, SEPTIC ARTHRITIS, TENDON OR NEUROVASCULAR INJURY, thus I consider the discharge disposition reasonable. FINAL IMPRESSION      1. Muscle strain of right gluteal region, initial encounter          DISPOSITION/PLAN   DISPOSITION Decision To Discharge 07/20/2022 09:17:35 PM      PATIENT REFERRED TO:  DUANE Gilliam CNP  [address]  [phone]    Schedule an appointment as soon as possible for a visit       DISCHARGE MEDICATIONS:  Discharge Medication List as of 7/20/2022  9:31 PM          DISCONTINUED MEDICATIONS:  Discharge Medication List as of 7/20/2022  9:31 PM        STOP taking these medications       meclizine (ANTIVERT) 25 MG tablet Comments:   Reason for Stopping:         atorvastatin (LIPITOR) 40 MG tablet Comments:   Reason for Stopping:         omeprazole (PRILOSEC) 20 MG delayed release capsule Comments:   Reason for Stopping:         Skin Protectants, Misc.  (EUCERIN) cream Comments:   Reason for Stopping:         hydrOXYzine (ATARAX) 25 MG tablet Comments:   Reason for Stopping:                      (Please note that portions of this note were completed with a voice recognition program.  Efforts were made to edit the dictations but occasionally words are mis-transcribed.)    DUANE Morrissey CNP (electronically signed)           DUANE Morrissey CNP  07/20/22 7049

## 2022-11-15 ENCOUNTER — OFFICE VISIT (OUTPATIENT)
Dept: INTERNAL MEDICINE CLINIC | Age: 67
End: 2022-11-15
Payer: COMMERCIAL

## 2022-11-15 VITALS — HEIGHT: 62 IN | HEART RATE: 56 BPM | OXYGEN SATURATION: 99 % | BODY MASS INDEX: 32.01 KG/M2 | TEMPERATURE: 97.4 F

## 2022-11-15 DIAGNOSIS — I47.1 SVT (SUPRAVENTRICULAR TACHYCARDIA) (HCC): ICD-10-CM

## 2022-11-15 DIAGNOSIS — M75.42 IMPINGEMENT SYNDROME OF LEFT SHOULDER: ICD-10-CM

## 2022-11-15 DIAGNOSIS — Z76.89 ENCOUNTER TO ESTABLISH CARE: Primary | ICD-10-CM

## 2022-11-15 DIAGNOSIS — E55.9 VITAMIN D DEFICIENCY: ICD-10-CM

## 2022-11-15 DIAGNOSIS — R73.03 PREDIABETES: ICD-10-CM

## 2022-11-15 DIAGNOSIS — Z78.0 POSTMENOPAUSAL: ICD-10-CM

## 2022-11-15 DIAGNOSIS — Q21.12 PFO (PATENT FORAMEN OVALE): ICD-10-CM

## 2022-11-15 DIAGNOSIS — M25.512 ACUTE PAIN OF LEFT SHOULDER: ICD-10-CM

## 2022-11-15 PROCEDURE — 1036F TOBACCO NON-USER: CPT | Performed by: NURSE PRACTITIONER

## 2022-11-15 PROCEDURE — G8417 CALC BMI ABV UP PARAM F/U: HCPCS | Performed by: NURSE PRACTITIONER

## 2022-11-15 PROCEDURE — G8484 FLU IMMUNIZE NO ADMIN: HCPCS | Performed by: NURSE PRACTITIONER

## 2022-11-15 PROCEDURE — G8427 DOCREV CUR MEDS BY ELIG CLIN: HCPCS | Performed by: NURSE PRACTITIONER

## 2022-11-15 PROCEDURE — 3017F COLORECTAL CA SCREEN DOC REV: CPT | Performed by: NURSE PRACTITIONER

## 2022-11-15 PROCEDURE — 1090F PRES/ABSN URINE INCON ASSESS: CPT | Performed by: NURSE PRACTITIONER

## 2022-11-15 PROCEDURE — 99204 OFFICE O/P NEW MOD 45 MIN: CPT | Performed by: NURSE PRACTITIONER

## 2022-11-15 PROCEDURE — 1123F ACP DISCUSS/DSCN MKR DOCD: CPT | Performed by: NURSE PRACTITIONER

## 2022-11-15 PROCEDURE — G8400 PT W/DXA NO RESULTS DOC: HCPCS | Performed by: NURSE PRACTITIONER

## 2022-11-15 RX ORDER — ACETAMINOPHEN 500 MG
1000 TABLET ORAL EVERY 6 HOURS PRN
COMMUNITY

## 2022-11-15 RX ORDER — METHYLPREDNISOLONE 4 MG/1
TABLET ORAL
Qty: 1 KIT | Refills: 0 | Status: SHIPPED | OUTPATIENT
Start: 2022-11-15 | End: 2022-11-21

## 2022-11-15 SDOH — ECONOMIC STABILITY: FOOD INSECURITY: WITHIN THE PAST 12 MONTHS, THE FOOD YOU BOUGHT JUST DIDN'T LAST AND YOU DIDN'T HAVE MONEY TO GET MORE.: NEVER TRUE

## 2022-11-15 SDOH — ECONOMIC STABILITY: FOOD INSECURITY: WITHIN THE PAST 12 MONTHS, YOU WORRIED THAT YOUR FOOD WOULD RUN OUT BEFORE YOU GOT MONEY TO BUY MORE.: NEVER TRUE

## 2022-11-15 ASSESSMENT — PATIENT HEALTH QUESTIONNAIRE - PHQ9
1. LITTLE INTEREST OR PLEASURE IN DOING THINGS: 1
SUM OF ALL RESPONSES TO PHQ QUESTIONS 1-9: 2
2. FEELING DOWN, DEPRESSED OR HOPELESS: 1
SUM OF ALL RESPONSES TO PHQ QUESTIONS 1-9: 2
SUM OF ALL RESPONSES TO PHQ9 QUESTIONS 1 & 2: 2

## 2022-11-15 ASSESSMENT — SOCIAL DETERMINANTS OF HEALTH (SDOH): HOW HARD IS IT FOR YOU TO PAY FOR THE VERY BASICS LIKE FOOD, HOUSING, MEDICAL CARE, AND HEATING?: NOT HARD AT ALL

## 2022-11-15 NOTE — PROGRESS NOTES
SUBJECTIVE:    Patient ID: Lonny Madrigal is a 78 y.o. female. CC: New patient appointment    HPI: The patient presents to the office to [address] a new primary care provider. Mobile Andorran interpretor used. Very poor historian; very difficult to get simple or straight-forward answers to questions. On multiple occassions I, or the interpretor, had to redirect patient back to topics. PFO evaluated by cardiology. Previous PCP sent her to GI for constipation. This is a lifelong problem. She was seen by GI and placed on fiber. She reports some better, some not better. She takes vitamin B12. She was told to take this in the past for her \"nerves. \"    Pain in left arm; primarily in shoulder. Symptoms for 2 months. No known injury or trauma. Pain worse with lifting. 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 No past treatments. Spot on her back was previously drained but is now bothering her again. Bothersome for one month. No drainage. No fever. No treatments at home.       Past Medical History:   Diagnosis Date    Hypertension         Past Surgical History:   Procedure Laterality Date    BUNIONECTOMY      COLONOSCOPY N/A 9/6/2019    COLONOSCOPY performed by Nick Goyal MD at  [facility]  4/2/12    Dilatation and curetage, hysteroscopy, endometrial biopsy    DILATION AND CURETTAGE OF UTERUS  03/09/2017    HYSTEROSCOPY DILATATION AND CURETTAGE MYOSURE    HYSTEROSCOPY      HYSTEROSCOPY N/A 08/16/2018    HYSTEROSCOPY DILATATION AND CURETTAGE MYOSURE    UPPER GASTROINTESTINAL ENDOSCOPY N/A 3/1/2021    EGD BIOPSY performed by Nick Goyal MD at [address] [place] [address]       Family History   Problem Relation Age of Onset    Diabetes Sister        Social History     Socioeconomic History    Marital status:      Spouse name: Not on file    Number of children: Not on file    Years of education: Not on file    Highest education level: Not on file   Occupational History    Not on file   Tobacco Use    Smoking status: Never    Smokeless tobacco: Never   Vaping Use    Vaping Use: Never used   Substance and Sexual Activity    Alcohol use: No    Drug use: No     Frequency: 3.0 times per week    Sexual activity: Not on file   Other Topics Concern    Not on file   Social History Narrative    Not on file     Social Determinants of Health     Financial Resource Strain: Low Risk     Difficulty of Paying Living Expenses: Not hard at all   Food Insecurity: No Food Insecurity    Worried About Running Out of Food in the Last Year: Never true    Ran Out of Food in the Last Year: Never true   Transportation Needs: Not on file   Physical Activity: Not on file   Stress: Not on file   Social Connections: Not on file   Intimate Partner Violence: Not on file   Housing Stability: Not on file       Current Outpatient Medications on File Prior to Visit   Medication Sig Dispense Refill    acetaminophen (TYLENOL) 500 MG tablet Take 1,000 mg by mouth every 6 hours as needed for Pain      ibuprofen (ADVIL;MOTRIN) 600 MG tablet Take 1 tablet by mouth every 6 hours as needed for Pain 20 tablet 0    aspirin 81 MG chewable tablet 81 mg daily       No current facility-administered medications on file prior to visit. No Known Allergies         Review of Systems   Constitutional: Negative. Respiratory: Negative. Cardiovascular: Negative. Gastrointestinal:  Positive for constipation. Negative for blood in stool, nausea and vomiting. Genitourinary: Negative. Musculoskeletal:  Positive for arthralgias. Neurological: Negative. Psychiatric/Behavioral: Negative. All other systems reviewed and are negative. OBJECTIVE:  Physical Exam  Vitals reviewed. Constitutional:       General: She is not in acute distress. Appearance: She is well-developed. She is not diaphoretic. HENT:      Head: Normocephalic and atraumatic.    Eyes:      General: No scleral icterus. Conjunctiva/sclera: Conjunctivae normal.   Neck:      Vascular: No JVD. Cardiovascular:      Rate and Rhythm: Normal rate and regular rhythm. Pulmonary:      Effort: Pulmonary effort is normal. No respiratory distress. Breath sounds: Normal breath sounds. No wheezing. Abdominal:      General: There is no distension. Palpations: Abdomen is soft. Tenderness: There is no abdominal tenderness. There is no guarding or rebound. Musculoskeletal:      Left shoulder: Tenderness present. No deformity or crepitus. Decreased range of motion. Normal strength. Normal pulse. Cervical back: Neck supple. Skin:     General: Skin is warm and dry. Neurological:      Mental Status: She is alert and oriented to person, place, and time. Psychiatric:         Behavior: Behavior normal.         Thought Content: Thought content normal.        Pulse 56   Temp 97.4 °F (36.3 °C)   Ht 5' 2\" (1.575 m)   SpO2 99%   BMI 32.01 kg/m²        PHQ Scores 11/15/2022   PHQ2 Score 2   PHQ9 Score 2     Interpretation of Total Score DepressionSeverity: 1-4 = Minimal depression, 5-9 = Mild depression, 10-14 = Moderate depression, 15-19 = Moderately severe depression, 20-27 = Severe depression         ASSESSMENT/PLAN:  Marbella Curtis was seen today for established new doctor and arm pain. Diagnoses and all orders for this visit:    Encounter to establish care  - Difficult to obtain history 2/2 language barrier and patient poor historian  - Information also obtained from EMR review    Impingement syndrome of left shoulder  -     methylPREDNISolone (MEDROL, FINA,) 4 MG tablet; Take as directed  -     [facility]    Acute pain of left shoulder  -     methylPREDNISolone (MEDROL, FINA,) 4 MG tablet;  Take as directed  -     [address] [place] [address]    PFO (patent foramen ovale)  - See by cardiology  - No surgery recommended  - Follow-up with cardiology as directed    SVT (supraventricular tachycardia) ([facility] [address]  - See by cardiology  - Onida to be short asymptomatic runs  - Follow-up cardiology as directed    Prediabetes  - Chronic, mild  - Last A1c 5.7  - Monitor    Vitamin D deficiency  - Pulled from EMR  - Will check vit D with next labs    Postmenopausal  - Will look for past DEXA and order if not done      Over 45 minutes was spent with the patient today as follows:   Preparing to see the patient (e.g., review of tests)  Obtaining and/or reviewing separately obtained history  Performing a medically appropriate examination and/or evaluation  Counseling and educating the patient/family/caregiver  Documenting clinical information in the electronic health record  Independently interpreting results and communicating results to the patient/family/caregiver        DUANE Craft - CNP

## 2022-11-27 PROBLEM — E55.9 VITAMIN D DEFICIENCY: Status: ACTIVE | Noted: 2022-11-27

## 2022-11-27 PROBLEM — R73.03 PREDIABETES: Status: ACTIVE | Noted: 2022-11-27

## 2022-11-27 PROBLEM — I47.10 SVT (SUPRAVENTRICULAR TACHYCARDIA): Status: ACTIVE | Noted: 2022-11-27

## 2022-11-27 PROBLEM — R07.9 CHEST PAIN: Status: RESOLVED | Noted: 2020-08-07 | Resolved: 2022-11-27

## 2022-11-27 PROBLEM — I47.1 SVT (SUPRAVENTRICULAR TACHYCARDIA) (HCC): Status: ACTIVE | Noted: 2022-11-27

## 2022-11-27 ASSESSMENT — ENCOUNTER SYMPTOMS
CONSTIPATION: 1
RESPIRATORY NEGATIVE: 1
VOMITING: 0
BLOOD IN STOOL: 0
NAUSEA: 0

## 2022-12-06 ENCOUNTER — HOSPITAL ENCOUNTER (OUTPATIENT)
Dept: PHYSICAL THERAPY | Age: 67
Setting detail: THERAPIES SERIES
Discharge: HOME | End: 2022-12-06
Payer: COMMERCIAL

## 2022-12-06 PROCEDURE — 97161 PT EVAL LOW COMPLEX 20 MIN: CPT

## 2022-12-06 PROCEDURE — 97110 THERAPEUTIC EXERCISES: CPT

## 2022-12-06 NOTE — FLOWSHEET NOTE
[address] [facility]  Phone: [phone]   Fax: [phone]    Physical Therapy Daily Treatment Note    Date:  2022     Patient Name:  Beverly Sena    :  1955  MRN: [id number]  Medical Diagnosis:  Impingement syndrome of left shoulder [M75.42]  Acute pain of left shoulder [M25.512]  Treatment Diagnosis: L shoulder pain, decreased LUE strength, decreased LUE AROM, difficulty reaching OH    Insurance/Certification information:  PT Insurance Information: [employer]  Physician Information:  DUANE Leung  Plan of care signed (Y/N): []  Yes [x]  No     Date of Patient follow up with Physician:      Progress Report: []  Yes  [x]  No     Date Range for reporting period:  Beginnin2022  Ending:     Progress report due (10 Rx/or 30 days whichever is less): visit #10 or 99      Recertification due (POC duration/ or 90 days whichever is less): visit #12 or 3/6/23 (date)     Visit # Insurance Allowable Auth required?  Date Range    20- hard max []  Yes  [x]  No 22-     Latex Allergy:  [x]NO      []YES  Preferred Language for Healthcare:   [x]English       []other:    Functional Scale:           Date assessed:  TO physical FS primary measure score = 67; risk adjusted = 49  22    Pain level:  0/10     SUBJECTIVE:  See eval    OBJECTIVE: See eval      RESTRICTIONS/PRECAUTIONS: HTN    Exercises/Interventions:     Therapeutic Exercises (04293) Resistance / level Sets/sec Reps Notes   UBE-fwd, bwd       Pullies-flex, abd       Scap squeeze  1 10   5\" HEP   BUE ER       SB  -BUE flexion  -table rolls       Tables slides  Wall slides                            Therapeutic Activities (83616)       Shelf taps       Mod plank on table                                                        Neuromuscular Re-ed (82088)       Wall ball                                          Manual Intervention (34795)       LUE distraction       L GHJ mob -posterior, inferior                                       Modalities:     Pt. Education:  12/06/2022  -patient educated on diagnosis, prognosis and expectations for rehab  -all patient questions were answered  -educated on postural awareness with all shoulder mobility in order to reduce pain with LUE movement. Home Exercise Program:  Access Code: [email or web address]  URL: [email or web address]  Date: 12/06/2022  Prepared by: Darinel Simons    Exercises  Seated Scapular Retraction - 1 x daily - 7 x weekly - 1 sets - 10 reps - 5 sec hold      Therapeutic Exercise and NMR EXR  [] (32722) Provided verbal/tactile cueing for activities related to strengthening, flexibility, endurance, ROM for improvements in  [] LE / Lumbar: LE, proximal hip, and core control with self care, mobility, lifting, ambulation. [] UE / Cervical: cervical, postural, scapular, scapulothoracic and UE control with self care, reaching, carrying, lifting, house/yardwork, driving, computer work.  [] (69525) Provided verbal/tactile cueing for activities related to improving balance, coordination, kinesthetic sense, posture, motor skill, proprioception to assist with   [] LE / lumbar: LE, proximal hip, and core control in self care, mobility, lifting, ambulation and eccentric single leg control. [] UE / cervical: cervical, scapular, scapulothoracic and UE control with self care, reaching, carrying, lifting, house/yardwork, driving, computer work.   [] (70878) Therapist is in constant attendance of 2 or more patients providing skilled therapy interventions, but not providing any significant amount of measurable one-on-one time to either patient, for improvements in  [] LE / lumbar: LE, proximal hip, and core control in self care, mobility, lifting, ambulation and eccentric single leg control.    [] UE / cervical: cervical, scapular, scapulothoracic and UE control with self care, reaching, carrying, lifting, house/yardwork, driving, computer work.     Laly Felix and Therapeutic Activities:    [] (03897 or 82865) Provided verbal/tactile cueing for activities related to improving balance, coordination, kinesthetic sense, posture, motor skill, proprioception and motor activation to allow for proper function of   [] LE: / Lumbar core, proximal hip and LE with self care and ADLs  [] UE / Cervical: cervical, postural, scapular, scapulothoracic and UE control with self care, carrying, lifting, driving, computer work.   [] (76219) Gait Re-education- Provided training and instruction to the patient for proper LE, core and proximal hip recruitment and positioning and eccentric body weight control with ambulation re-education including up and down stairs     Home Management Training / Self Care:  [] (99316) Provided self-care/home management training related to activities of daily living and compensatory training, and/or use of adaptive equipment for improvement with: ADLs and compensatory training, meal preparation, safety procedures and instruction in use of adaptive equipment, including bathing, grooming, dressing, personal hygiene, basic household cleaning and chores.      Home Exercise Program:    [x] (70892) Reviewed/Progressed HEP activities related to strengthening, flexibility, endurance, ROM of   [] LE / Lumbar: core, proximal hip and LE for functional self-care, mobility, lifting and ambulation/stair navigation   [x] UE / Cervical: cervical, postural, scapular, scapulothoracic and UE control with self care, reaching, carrying, lifting, house/yardwork, driving, computer work  [] (40933)Reviewed/Progressed HEP activities related to improving balance, coordination, kinesthetic sense, posture, motor skill, proprioception of   [] LE: core, proximal hip and LE for self care, mobility, lifting, and ambulation/stair navigation    [] UE / Cervical: cervical, postural,  scapular, scapulothoracic and UE control with self care, reaching, carrying, lifting, house/yardwork, driving, computer work    Manual Treatments:  PROM / STM / Oscillations-Mobs:  G-I, II, III, IV (PA's, Inf., Post.)  [] (45886) Provided manual therapy to mobilize LE, proximal hip and/or LS spine soft tissue/joints for the purpose of modulating pain, promoting relaxation,  increasing ROM, reducing/eliminating soft tissue swelling/inflammation/restriction, improving soft tissue extensibility and allowing for proper ROM for normal function with   [] LE / lumbar: self care, mobility, lifting and ambulation. [] UE / Cervical: self care, reaching, carrying, lifting, house/yardwork, driving, computer work. Modalities:  [] (36674) Vasopneumatic compression: Utilized vasopneumatic compression to decrease edema / swelling for the purpose of improving mobility and quad tone / recruitment which will allow for increased overall function including but not limited to self-care, transfers, ambulation, and ascending / descending stairs. Charges:  Timed Code Treatment Minutes: 10   Total Treatment Minutes: 45     [x] EVAL - LOW (13330)   [] EVAL - MOD (51311)  [] EVAL - HIGH (50641)  [] RE-EVAL (91334)  [] QG(52653) x       [] Ionto  [] NMR (30291) x       [] Vaso  [] Manual (30434) x       [] Ultrasound  [] TA x        [] Mech Traction (04793)  [] Aquatic Therapy x     [] ES (un) (39997):   [] Home Management Training x  [] ES(attended) (73558)   [] Dry Needling 1-2 muscles (79316):  [] Dry Needling 3+ muscles (300439)  [] Group:      [] Other:     GOALS:   Patient stated goal: \"no pain\"  [] Progressing: [] Met: [] Not Met: [] Adjusted     Therapist goals for Patient:   Short Term Goals: To be achieved in: 2 weeks  1. Independent in HEP and progression per patient tolerance, in order to prevent re-injury. [] Progressing: [] Met: [] Not Met: [] Adjusted  2. Patient will have a decrease in pain to facilitate improvement in movement, function, and ADLs as indicated by Functional Deficits.   [] Progressing: [] Met: [] Not Met: [] Adjusted     Long Term Goals: To be achieved in: 6 weeks  1. FOTO score of at least 69 to assist with reaching prior level of function. [] Progressing: [] Met: [] Not Met: [] Adjusted  2. Patient will demonstrate increased L shoulder flexion AROM to 170 deg to allow for proper joint functioning as indicated by patients Functional Deficits. [] Progressing: [] Met: [] Not Met: [] Adjusted  3. Patient will demonstrate an increase in R shoulder Strength to 5 to allow for proper functional mobility as indicated by patients Functional Deficits. [] Progressing: [] Met: [] Not Met: [] Adjusted  4. Patient will return to functional activities including reaching [place] without increased symptoms or restriction. [] Progressing: [] Met: [] Not Met: [] Adjusted  5. Pt will be able to lift box OH without increased pain and demonstrate improve ergonomics. [] Progressing: [] Met: [] Not Met: [] Adjusted     Overall Progression Towards Functional goals/ Treatment Progress Update:  [] Patient is progressing as expected towards functional goals listed. [] Progression is slowed due to complexities/Impairments listed. [] Progression has been slowed due to co-morbidities.   [x] Plan just implemented, too soon to assess goals progression <30days   [] Goals require adjustment due to lack of progress  [] Patient is not progressing as expected and requires additional follow up with physician  [] Other    Persisting Functional Limitations/Impairments:  [x]Sleeping []Sitting               []Standing []Transfers        []Walking []Kneeling               []Stairs []Squatting / bending   [x]ADLs [x]Reaching  [x]Lifting  [x]Housework  [x]Driving [x]Job related tasks  []Sports/Recreation []Other:        ASSESSMENT:  See eval  Treatment/Activity Tolerance:  [x] Patient able to complete tx [] Patient limited by fatigue  [x] Patient limited by pain  [] Patient limited by other medical complications  [] Other:     Prognosis: [x] Good [] Fair  [] Poor    Patient Requires Follow-up: [x] Yes  [] No    Plan for next treatment session:    PLAN: See eval. PT 2x / week for 6 weeks. [] Continue per plan of care [] Alter current plan (see comments)  [x] Plan of care initiated [] Hold pending MD visit [] Discharge    Electronically signed by: Conner Cordon, PT, DPT    Note: If patient does not return for scheduled/ recommended follow up visits, this note will serve as a discharge from care along with most recent update on progress.

## 2022-12-06 NOTE — PLAN OF CARE
[address] [address], [address]  Phone: [phone]   Fax: [phone]                                                     Physical Therapy Certification    Dear DUANE Cox* SEBASTIAN Lewis,    We had the pleasure of evaluating the following patient for physical therapy services at [address] [place] [address]. A summary of our findings can be found in the initial assessment below. This includes our plan of care. If you have any questions or concerns regarding these findings, please do not hesitate to contact me at the office phone number checked above. Thank you for the referral.       Physician Signature:_______________________________Date:__________________  By signing above (or electronic signature), therapists plan is approved by physician      Patient: Edmar Esquivel   : 1955   MRN: [id number]  Referring Physician: JOCELYNE Cox APRN-CNP      Evaluation Date: 2022      Medical Diagnosis Information:  Impingement syndrome of left shoulder [M75.42]  Acute pain of left shoulder [M25.512]   PT diagnosis: L shoulder pain, decreased LUE strength, decreased LUE AROM, difficulty reaching OH                                       Insurance information: PT Insurance Information: [id number] [employer]    Precautions/ Contra-indications: HTN  Latex Allergy:  [x]NO      []YES  Preferred Language for Healthcare:   [x]English       []Other:    C-SSRS Triggered by Intake questionnaire (Past 2 wk assessment ):   [x] No, Questionnaire did not trigger screening.   [] Yes, Patient intake triggered C-SSRS Screening     [] Completed, no further action required. [] Completed, PCP notified via Epic    SUBJECTIVE: Pt complains of pain in her L shoulder that started about 2 months when lifting heavy boxes at work. Described pain being around the entire joint, sometimes cracks.  Pt works Shoulder ER 4+ 4+   Shoulder IR 4+ 4+   Biceps (C6) 4+ 4+   Triceps (C7) 4+ 4+   Lats     Middle Trap     Rhomboids     Lower Trap      Pronation      Supination      Wrist Flex (C7)     Wrist Ext (C6)     Wrist Radial Deviation     Wrist Ulnar Deviation       4+ 5            Joint mobility: L GHJ   []Normal    [x]Hypo   []Hyper      Orthopaedic Special Tests Positive  Negative  NT Comments    Shoulder        Neer        Dereck-Horowitz LUE      Empty Can LUE      Drop Arm       Winnett's       Speeds        Sulcus        Apprehension (Anterior / Posterior)       Load and Shift              Elbow        Cozen's       Varus Stress       Valgus Stress        Tinel's                                               [x] Patient history, allergies, meds reviewed. Medical chart reviewed. See intake form. Review Of Systems (ROS):  [x]Performed Review of systems (Integumentary, CardioPulmonary, Neurological) by intake and observation. Intake form has been scanned into medical record. Patient has been instructed to contact their primary care physician regarding ROS issues if not already being addressed at this time.       Co-morbidities/Complexities (which will affect course of rehabilitation):   []None        []Hx of COVID   Arthritic conditions   []Rheumatoid arthritis (M05.9)  []Osteoarthritis (M19.91)  []Gout   Cardiovascular conditions   [x]Hypertension (I10)  []Hyperlipidemia (E78.5)  []Angina pectoris (I20)  []Atherosclerosis (I70)  []Pacemaker  []Hx of CABG/stent/  cardiac surgeries   Musculoskeletal conditions   []Disc pathology   []Congenital spine pathologies   []Osteoporosis (M81.8)  []Osteopenia (M85.8)  []Scoliosis       Endocrine conditions   []Hypothyroid (E03.9)  []Hyperthyroid Gastrointestinal conditions   []Constipation (O58.30)   Metabolic conditions   []Morbid obesity (E66.01)  []Diabetes type 1(E10.65) or 2 (E11.65)   []Neuropathy (G60.9)     Cardio/Pulmonary conditions   []Asthma (J45)  []Coughing   []COPD (J44.9)  []CHF  []A-fib   Psychological Disorders  []Anxiety (F41.9)  [x]Depression (F32.9)   []Other:   Developmental Disorders  []Autism (F84.0)  []CP (G80)  []Down Syndrome (Q90.9)  []Developmental delay     Neurological conditions  []Prior Stroke (I69.30)  []Parkinson's (G20)  []Encephalopathy (G93.40)  []MS (G35)  []Post-polio (G14)  []SCI  []TBI  []ALS Other conditions  []Fibromyalgia (M79.7)  []Vertigo  []Syncope  []Kidney Failure  []Cancer      []currently undergoing                treatment  []Pregnancy  []Incontinence   Prior surgeries  []involved limb  []previous spinal surgery  [] section birth  []hysterectomy  []bowel / bladder surgery  []other relevant surgeries   [x]Other:   bunionectomy           Barriers to/and or personal factors that will affect rehab potential:              [x]Age  [x]Sex    []Smoker              [x]Motivation                       []Co-Morbidities              []Cognitive Function, education/learning barriers              []Environmental, home barriers              []profession/work barriers  []past PT/medical experience  []other:       Falls Risk Assessment (30 days):   [x] Falls Risk assessed and no intervention required. [] Falls Risk assessed and Patient requires intervention due to being higher risk   TUG score (>12s at risk):     [] Falls education provided, including       ASSESSMENT: Pt is 78 y.o. female who presents with L shoulder tendonitis. She has increased L shoulder pain, decreased L shoulder AROM, decreased LUE strength, difficulty with reaching OH, and carrying items. Pt will benefit from skilled physical therapy in order to address the aforementioned deficits to improve overall function.      Functional Impairments   [x]Noted spinal or UE joint hypomobility   []Noted spinal or UE joint hypermobility   [x]Decreased UE functional ROM   [x]Decreased UE functional strength   []Abnormal reflexes/sensation/myotomal/dermatomal deficits   [x]Decreased RC/scapular/core strength and neuromuscular control   []other:      Functional Activity Limitations (from functional questionnaire and intake)   [x]Reduced ability to tolerate prolonged functional positions   [x]Reduced ability or difficulty with changes of positions or transfers between positions   [x]Reduced ability to maintain good posture and demonstrate good body mechanics with sitting, bending, and lifting   [] Reduced ability or tolerance with driving and/or computer work   [x]Reduced ability to sleep   [x]Reduced ability to perform lifting, reaching, carrying tasks   [x]Reduced ability to tolerate impact through UE   []Reduced ability to reach behind back   []Reduced ability to  or hold objects   []Reduced ability to throw or toss an object   []other:    Participation Restrictions   []Reduced participation in self care activities   [x]Reduced participation in home management activities   [x]Reduced participation in work activities   []Reduced participation in social activities. []Reduced participation in sport/recreation activities. Classification:   []Signs/symptoms consistent with post-surgical status including decreased ROM, strength and function.   []Signs/symptoms consistent with joint sprain/strain   []Signs/symptoms consistent with shoulder impingement   [x]Signs/symptoms consistent with shoulder tendinopathy   []Signs/symptoms consistent with Rotator cuff tear   []Signs/symptoms consistent with labral tear   [x]Signs/symptoms consistent with postural dysfunction    []Signs/symptoms consistent with Glenohumeral IR Deficit - <45 degrees   []Signs/symptoms consistent with facet dysfunction of cervical/thoracic spine    []Signs/symptoms consistent with pathology which may benefit from Dry needling     []other:     Prognosis/Rehab Potential:      []Excellent   [x]Good    []Fair   []Poor    Tolerance of evaluation/treatment: []Excellent   [x]Good    []Fair   []Poor    Physical Therapy Evaluation Complexity Justification  [x] A history of present problem with:  [] no personal factors and/or comorbidities that impact the plan of care;  [x]1-2 personal factors and/or comorbidities that impact the plan of care  []3 personal factors and/or comorbidities that impact the plan of care  [x] An examination of body systems using standardized tests and measures addressing any of the following: body structures and functions (impairments), activity limitations, and/or participation restrictions;:  [] a total of 1-2 or more elements   [] a total of 3 or more elements   [x] a total of 4 or more elements   [x] A clinical presentation with:  [x] stable and/or uncomplicated characteristics   [] evolving clinical presentation with changing characteristics  [] unstable and unpredictable characteristics;   [x] Clinical decision making of [x] low, [] moderate, [] high complexity using standardized patient assessment instrument and/or measurable assessment of functional outcome. [x] EVAL (LOW) 49190 (typically 15 minutes face-to-face)  [] EVAL (MOD) 79408 (typically 30 minutes face-to-face)  [] EVAL (HIGH) 94964 (typically 45 minutes face-to-face)  [] RE-EVAL     PLAN:  Frequency/Duration:  2 days per week for 6 Weeks:  INTERVENTIONS:  [x] Therapeutic exercise including: strength training, ROM, for Upper extremity and core   [x]  NMR activation and proprioception for UE, scap and Core   [x] Manual therapy as indicated for shoulder, scapula and spine to include: Dry Needling/IASTM, STM, PROM, Gr I-IV mobilizations, manipulation. [x] Modalities as needed that may include: thermal agents, E-stim, Biofeedback, US, iontophoresis as indicated  [x] Patient education on joint protection, postural re-education, activity modification, progression of HEP.     HEP instruction: Written HEP instructions provided and reviewed     GOALS:  Patient stated goal: \"no pain\"  [] Progressing: [] Met: [] Not Met: [] Adjusted    Therapist goals for Patient:   Short Term Goals: To be achieved in: 2 weeks  1. Independent in HEP and progression per patient tolerance, in order to prevent re-injury. [] Progressing: [] Met: [] Not Met: [] Adjusted  2. Patient will have a decrease in pain to facilitate improvement in movement, function, and ADLs as indicated by Functional Deficits. [] Progressing: [] Met: [] Not Met: [] Adjusted    Long Term Goals: To be achieved in: 6 weeks  1. FOTO score of at least 69 to assist with reaching prior level of function. [] Progressing: [] Met: [] Not Met: [] Adjusted  2. Patient will demonstrate increased L shoulder flexion AROM to 170 deg to allow for proper joint functioning as indicated by patients Functional Deficits. [] Progressing: [] Met: [] Not Met: [] Adjusted  3. Patient will demonstrate an increase in R shoulder Strength to 5 to allow for proper functional mobility as indicated by patients Functional Deficits. [] Progressing: [] Met: [] Not Met: [] Adjusted  4. Patient will return to functional activities including reaching [place] without increased symptoms or restriction. [] Progressing: [] Met: [] Not Met: [] Adjusted  5. Pt will be able to lift box OH without increased pain and demonstrate improve ergonomics.    [] Progressing: [] Met: [] Not Met: [] Adjusted     Electronically signed by:  Tiffanie Otero, PT, DPT

## 2022-12-09 ENCOUNTER — HOSPITAL ENCOUNTER (EMERGENCY)
Age: 67
Discharge: HOME | End: 2022-12-10
Payer: COMMERCIAL

## 2022-12-09 ENCOUNTER — APPOINTMENT (OUTPATIENT)
Dept: GENERAL RADIOLOGY | Age: 67
End: 2022-12-09
Payer: COMMERCIAL

## 2022-12-09 VITALS
RESPIRATION RATE: 16 BRPM | SYSTOLIC BLOOD PRESSURE: 133 MMHG | OXYGEN SATURATION: 95 % | TEMPERATURE: 97.8 F | HEART RATE: 74 BPM | DIASTOLIC BLOOD PRESSURE: 79 MMHG

## 2022-12-09 DIAGNOSIS — M25.512 ACUTE PAIN OF LEFT SHOULDER: ICD-10-CM

## 2022-12-09 DIAGNOSIS — R42 DIZZINESS: Primary | ICD-10-CM

## 2022-12-09 LAB
A/G RATIO: 1.5 (ref 1.1–2.2)
ALBUMIN SERPL-MCNC: 3.6 G/DL (ref 3.4–5)
ALP BLD-CCNC: 72 U/L (ref 40–129)
ALT SERPL-CCNC: 7 U/L (ref 10–40)
ANION GAP SERPL CALCULATED.3IONS-SCNC: 7 MMOL/L (ref 3–16)
AST SERPL-CCNC: 17 U/L (ref 15–37)
BASOPHILS ABSOLUTE: 0.1 K/UL (ref 0–0.2)
BASOPHILS RELATIVE PERCENT: 0.8 %
BILIRUB SERPL-MCNC: <0.2 MG/DL (ref 0–1)
BUN BLDV-MCNC: 19 MG/DL (ref 7–20)
CALCIUM SERPL-MCNC: 8.9 MG/DL (ref 8.3–10.6)
CHLORIDE BLD-SCNC: 107 MMOL/L (ref 99–110)
CO2: 27 MMOL/L (ref 21–32)
CREAT SERPL-MCNC: 0.6 MG/DL (ref 0.6–1.2)
EOSINOPHILS ABSOLUTE: 0.1 K/UL (ref 0–0.6)
EOSINOPHILS RELATIVE PERCENT: 1.1 %
GFR SERPL CREATININE-BSD FRML MDRD: >60 ML/MIN/{1.73_M2}
GLUCOSE BLD-MCNC: 96 MG/DL (ref 70–99)
HCT VFR BLD CALC: 37.4 % (ref 36–48)
HEMOGLOBIN: 12 G/DL (ref 12–16)
LYMPHOCYTES ABSOLUTE: 2.7 K/UL (ref 1–5.1)
LYMPHOCYTES RELATIVE PERCENT: 37.5 %
MCH RBC QN AUTO: 27 PG (ref 26–34)
MCHC RBC AUTO-ENTMCNC: 32.2 G/DL (ref 31–36)
MCV RBC AUTO: 83.6 FL (ref 80–100)
MONOCYTES ABSOLUTE: 0.5 K/UL (ref 0–1.3)
MONOCYTES RELATIVE PERCENT: 7.4 %
NEUTROPHILS ABSOLUTE: 3.8 K/UL (ref 1.7–7.7)
NEUTROPHILS RELATIVE PERCENT: 53.2 %
PDW BLD-RTO: 16.2 % (ref 12.4–15.4)
PLATELET # BLD: 206 K/UL (ref 135–450)
PMV BLD AUTO: 8.7 FL (ref 5–10.5)
POTASSIUM REFLEX MAGNESIUM: 4.2 MMOL/L (ref 3.5–5.1)
RBC # BLD: 4.47 M/UL (ref 4–5.2)
SODIUM BLD-SCNC: 141 MMOL/L (ref 136–145)
TOTAL PROTEIN: 6 G/DL (ref 6.4–8.2)
TROPONIN: <0.01 NG/ML
WBC # BLD: 7.2 K/UL (ref 4–11)

## 2022-12-09 PROCEDURE — 84484 ASSAY OF TROPONIN QUANT: CPT

## 2022-12-09 PROCEDURE — 6370000000 HC RX 637 (ALT 250 FOR IP): Performed by: PHYSICIAN ASSISTANT

## 2022-12-09 PROCEDURE — 99285 EMERGENCY DEPT VISIT HI MDM: CPT

## 2022-12-09 PROCEDURE — 81001 URINALYSIS AUTO W/SCOPE: CPT

## 2022-12-09 PROCEDURE — 93005 ELECTROCARDIOGRAM TRACING: CPT | Performed by: PHYSICIAN ASSISTANT

## 2022-12-09 PROCEDURE — 85025 COMPLETE CBC W/AUTO DIFF WBC: CPT

## 2022-12-09 PROCEDURE — 71045 X-RAY EXAM CHEST 1 VIEW: CPT

## 2022-12-09 PROCEDURE — 80053 COMPREHEN METABOLIC PANEL: CPT

## 2022-12-09 PROCEDURE — 36415 COLL VENOUS BLD VENIPUNCTURE: CPT

## 2022-12-09 RX ORDER — MECLIZINE HCL 12.5 MG/1
12.5 TABLET ORAL ONCE
Status: COMPLETED | OUTPATIENT
Start: 2022-12-09 | End: 2022-12-09

## 2022-12-09 RX ORDER — METHYLPREDNISOLONE 4 MG/1
4 TABLET ORAL DAILY
COMMUNITY

## 2022-12-09 RX ADMIN — MECLIZINE 12.5 MG: 12.5 TABLET ORAL at 22:30

## 2022-12-09 ASSESSMENT — ENCOUNTER SYMPTOMS
DIARRHEA: 0
COLOR CHANGE: 0
SHORTNESS OF BREATH: 0
VOMITING: 0
ABDOMINAL PAIN: 0

## 2022-12-09 NOTE — Clinical Note
Verna Nava was seen and treated in our emergency department on 12/9/2022. She may return to work on 12/12/2022. If you have any questions or concerns, please don't hesitate to call.       DOUGLAS Beckett

## 2022-12-10 LAB
BACTERIA: ABNORMAL /HPF
BILIRUBIN URINE: NEGATIVE
BLOOD, URINE: NEGATIVE
CLARITY: CLEAR
COLOR: YELLOW
COMMENT UA: ABNORMAL
EKG ATRIAL RATE: 61 BPM
EKG DIAGNOSIS: NORMAL
EKG P AXIS: 67 DEGREES
EKG P-R INTERVAL: 190 MS
EKG Q-T INTERVAL: 392 MS
EKG QRS DURATION: 86 MS
EKG QTC CALCULATION (BAZETT): 394 MS
EKG R AXIS: 56 DEGREES
EKG T AXIS: 56 DEGREES
EKG VENTRICULAR RATE: 61 BPM
EPITHELIAL CELLS, UA: 1 /HPF (ref 0–5)
GLUCOSE URINE: NEGATIVE MG/DL
HYALINE CASTS: 0 /LPF (ref 0–8)
KETONES, URINE: NEGATIVE MG/DL
LEUKOCYTE ESTERASE, URINE: ABNORMAL
MICROSCOPIC EXAMINATION: YES
NITRITE, URINE: NEGATIVE
PH UA: 7.5 (ref 5–8)
PROTEIN UA: NEGATIVE MG/DL
RBC UA: 0 /HPF (ref 0–4)
SPECIFIC GRAVITY UA: 1.01 (ref 1–1.03)
URINE REFLEX TO CULTURE: ABNORMAL
URINE TYPE: ABNORMAL
UROBILINOGEN, URINE: 0.2 E.U./DL
WBC UA: ABNORMAL /HPF (ref 0–5)

## 2022-12-10 PROCEDURE — 93010 ELECTROCARDIOGRAM REPORT: CPT | Performed by: INTERNAL MEDICINE

## 2022-12-10 RX ORDER — MECLIZINE HYDROCHLORIDE 25 MG/1
25 TABLET ORAL 3 TIMES DAILY PRN
Qty: 15 TABLET | Refills: 0 | Status: SHIPPED | OUTPATIENT
Start: 2022-12-09 | End: 2022-12-19

## 2022-12-10 NOTE — ED NOTES
Discharge paperwork reviewed with patient, patient v/u and denies further questions at this time.       Ariane Delgado RN  12/10/22 0906

## 2022-12-10 NOTE — DISCHARGE INSTRUCTIONS
Follow-up with your primary care doctor next week for recheck    Take meclizine medication as needed for dizziness    Continue to take Tylenol and ibuprofen for pain as prescribed from your primary care doctor    Return to the emergency room for any worsening symptoms.

## 2022-12-10 NOTE — ED PROVIDER NOTES
[facility]        Pt Name: Conan Schaumann  MRN: [id number]  [place] 1955  Date of evaluation: 12/9/2022  Provider: DOUGLAS Hamlin  PCP: DUANE Trejo CNP  Note Started: 10:38 PM EST 12/9/22          ISRRAEL. I have evaluated this patient. My supervising physician was available for consultation. CHIEF COMPLAINT       Chief Complaint   Patient presents with    Dizziness     Pt states that she has been left shoulder and arm pain for 2 months, yesterday she was dizzy, pt states that she has coldness/numb in left foot. Pt states left leg pain as well. Pt states weakness yesterday. Pt states pee looks clear. Pt states back hurting and burns where lungs are. HISTORY OF PRESENT ILLNESS   (Location, Timing/Onset, Context/Setting, Quality, Duration, Modifying Factors, Severity, Associated Signs and Symptoms)  Note limiting factors. Chief Complaint: Dizziness Conan Schaumann is a 78 y.o. female who presents to the emergency room due to dizziness for the past 3 days. Patient states that this is gradually come on and last approximately 5 minutes at a time. She states that it feels more like she is off balance versus room spinning sensation goes away on its own. Patient denies any headaches, vision changes, nausea or vomiting associated with this. Patient is also complaining of left shoulder pain and left cold foot. Patient states that she has followed up with her primary care doctor and is currently on steroid medications for these complaints and is following up with physical therapy. Patient is mainly here to talk about her dizziness as this is newer and has not been addressed with her primary care doctor. Patient denies any fevers or chills. Patient denies any calf pain or lower leg swelling. Denies history of DVT or PE.     Nursing Notes were all reviewed and agreed with or any disagreements were addressed in the HPI. REVIEW OF SYSTEMS    (2-9 systems for level 4, 10 or more for level 5)     Review of Systems   Constitutional:  Negative for chills, fatigue and fever. Respiratory:  Negative for shortness of breath. Cardiovascular:  Negative for chest pain. Gastrointestinal:  Negative for abdominal pain, diarrhea and vomiting. Musculoskeletal:  Negative for joint swelling. Skin:  Negative for color change, pallor, rash and wound. Neurological:  Positive for dizziness. Positives and Pertinent negatives as per HPI. Except as noted above in the ROS, all other systems were reviewed and negative. PAST MEDICAL HISTORY     Past Medical History:   Diagnosis Date    Hypertension          SURGICAL HISTORY     Past Surgical History:   Procedure Laterality Date    BUNIONECTOMY      COLONOSCOPY N/A 9/6/2019    COLONOSCOPY performed by Jelena Rajput MD at [facility] [address]  4/2/12    Dilatation and curetage, hysteroscopy, endometrial biopsy    DILATION AND CURETTAGE OF UTERUS  03/09/2017    HYSTEROSCOPY DILATATION AND CURETTAGE MYOSURE    HYSTEROSCOPY      HYSTEROSCOPY N/A 08/16/2018    HYSTEROSCOPY DILATATION AND CURETTAGE MYOSURE    UPPER GASTROINTESTINAL ENDOSCOPY N/A 3/1/2021    EGD BIOPSY performed by Jelena Rajput MD at [address]       Previous Medications    ACETAMINOPHEN (TYLENOL) 500 MG TABLET    Take 1,000 mg by mouth every 6 hours as needed for Pain    ASPIRIN 81 MG CHEWABLE TABLET    81 mg daily    IBUPROFEN (ADVIL;MOTRIN) 600 MG TABLET    Take 1 tablet by mouth every 6 hours as needed for Pain    METHYLPREDNISOLONE (MEDROL) 4 MG TABLET    Take 4 mg by mouth daily         ALLERGIES     Patient has no known allergies.     FAMILYHISTORY       Family History   Problem Relation Age of Onset    Diabetes Sister           SOCIAL HISTORY       Social History     Tobacco Use    Smoking status: Never    Smokeless tobacco: Never Vaping Use    Vaping Use: Never used   Substance Use Topics    Alcohol use: No    Drug use: No     Frequency: 3.0 times per week       SCREENINGS    Geraldine Coma Scale  Eye Opening: Spontaneous  Best Verbal Response: Oriented  Best Motor Response: Obeys commands  Geraldine Coma Scale Score: 15        PHYSICAL EXAM    (up to 7 for level 4, 8 or more for level 5)     ED Triage Vitals [12/09/22 2144]   BP Temp Temp Source Heart Rate Resp SpO2 Height Weight   133/79 97.8 °F (36.6 °C) Oral 74 16 95 % -- --       Physical Exam  Constitutional:       General: She is not in acute distress. Appearance: She is not ill-appearing. HENT:      Head: Normocephalic. Mouth/Throat:      Mouth: Mucous membranes are moist.      Pharynx: Oropharynx is clear. No oropharyngeal exudate or posterior oropharyngeal erythema. Eyes:      Extraocular Movements: Extraocular movements intact. Pupils: Pupils are equal, round, and reactive to light. Cardiovascular:      Rate and Rhythm: Normal rate and regular rhythm. Pulses: Normal pulses. Heart sounds: Normal heart sounds. Pulmonary:      Effort: Pulmonary effort is normal.      Breath sounds: Normal breath sounds. Abdominal:      General: Bowel sounds are normal. There is no distension. Palpations: There is no mass. Tenderness: There is no abdominal tenderness. Musculoskeletal:      Cervical back: Normal range of motion and neck supple. Right lower leg: No edema. Left lower leg: No edema. Neurological:      General: No focal deficit present. Mental Status: She is alert and oriented to person, place, and time. GCS: GCS eye subscore is 4. GCS verbal subscore is 5. GCS motor subscore is 6. Cranial Nerves: Cranial nerves 2-12 are intact. Sensory: Sensation is intact. Motor: No weakness. Coordination: Coordination is intact. Gait: Gait is intact. Comments: Negative test of skew. Normal gait. Psychiatric:         Mood and Affect: Mood normal.         Behavior: Behavior normal.       DIAGNOSTIC RESULTS   LABS:    Labs Reviewed   CBC WITH AUTO DIFFERENTIAL - Abnormal; Notable for the following components:       Result Value    RDW 16.2 (*)     All other components within normal limits   COMPREHENSIVE METABOLIC PANEL W/ REFLEX TO MG FOR LOW K - Abnormal; Notable for the following components: Total Protein 6.0 (*)     ALT 7 (*)     All other components within normal limits   URINALYSIS WITH REFLEX TO CULTURE - Abnormal; Notable for the following components:    Leukocyte Esterase, Urine MODERATE (*)     All other components within normal limits   TROPONIN   MICROSCOPIC URINALYSIS       When ordered only abnormal lab results are displayed. All other labs were within normal range or not returned as of this dictation. EKG: When ordered, EKG's are interpreted by the Emergency Department Physician in the absence of a cardiologist.  Please see their note for interpretation of EKG. RADIOLOGY:   Non-plain film images such as CT, Ultrasound and MRI are read by the radiologist. Plain radiographic images are visualized and preliminarily interpreted by the ED Provider with the below findings:        Interpretation per the Radiologist below, if available at the time of this note:    XR CHEST PORTABLE   Final Result   Stable chest without acute cardiopulmonary process. No results found.         PROCEDURES   Unless otherwise noted below, none     Procedures    CRITICAL CARE TIME       CONSULTS:  None      EMERGENCY DEPARTMENT COURSE and DIFFERENTIAL DIAGNOSIS/MDM:   Vitals:    Vitals:    12/09/22 2144   BP: 133/79   Pulse: 74   Resp: 16   Temp: 97.8 °F (36.6 °C)   TempSrc: Oral   SpO2: 95%       Patient was given the following medications:  Medications   meclizine (ANTIVERT) tablet 12.5 mg (12.5 mg Oral Given 12/9/22 2230)         Is this patient to be included in the SEP-1 Core Measure due to severe sepsis or septic shock? No   Exclusion criteria - the patient is NOT to be included for SEP-1 Core Measure due to: Infection is not suspected    27-year-old female presents to the emergency department due to dizziness that has been gradually going on for the last 3 days. Patient states that her last approximate 5 minutes at most.  Patient states that he is also complaining of left shoulder and left lower leg pain. Patient states that she is seeing her primary care doctor for this but wanted to bring up today and is going to physical therapy. On exam patient otherwise appears well in no acute distress. Negative nystagmus and negative test of skew patient has a normal gait. Work-up to include CBC and CMP were unremarkable. Troponin was normal.  Chest x-ray and EKG were unremarkable. Patient was given meclizine medication to help with her dizziness which did help. Patient will be discharged to follow-up primary care doctor or to return to emergency room if there is any worsening symptoms. At this time I have low suspicion for pneumonia, pertussis, hemothorax, pneumothorax, pericarditis, myocarditis, tension pneumothorax, influenza, COVID-19, pulmonary embolism, Aortic dissection, AAA or other acute emergency pathologies. FINAL IMPRESSION      1. Dizziness    2.  Acute pain of left shoulder          DISPOSITION/PLAN   DISPOSITION Decision To Discharge 12/10/2022 12:26:58 AM      PATIENT REFERRED TO:  DUANE Castellon - CNP  [address] [facility]  [phone]    Schedule an appointment as soon as possible for a visit in 1 week  [facility] Emergency Department  [address] [facility]  [phone]    As needed, If symptoms worsen    DISCHARGE MEDICATIONS:  New Prescriptions    MECLIZINE (ANTIVERT) 25 MG TABLET    Take 1 tablet by mouth 3 times daily as needed for Dizziness       DISCONTINUED MEDICATIONS:  Discontinued Medications    No medications on file              (Please note that portions of this note were completed with a voice recognition program.  Efforts were made to edit the dictations but occasionally words are mis-transcribed.)    DOUGLAS Gomez (electronically signed)            DOUGLAS Gomez  12/10/22 6864

## 2022-12-17 ENCOUNTER — HOSPITAL ENCOUNTER (OUTPATIENT)
Dept: PHYSICAL THERAPY | Age: 67
Setting detail: THERAPIES SERIES
Discharge: HOME | End: 2022-12-17
Payer: COMMERCIAL

## 2022-12-17 PROCEDURE — 97110 THERAPEUTIC EXERCISES: CPT

## 2022-12-17 PROCEDURE — 97140 MANUAL THERAPY 1/> REGIONS: CPT

## 2022-12-17 NOTE — FLOWSHEET NOTE
[address] [facility]  Phone: [phone]   Fax: [phone]    Physical Therapy Daily Treatment Note    Date:  2022     Patient Name:  Josy Leonard    :  1955  MRN: [id number]  Medical Diagnosis:  Impingement syndrome of left shoulder [M75.42]  Acute pain of left shoulder [M25.512]  Treatment Diagnosis: L shoulder pain, decreased LUE strength, decreased LUE AROM, difficulty reaching OH    Insurance/Certification information:  PT Insurance Information: [employer]  Physician Information:  DUANE Laws  Plan of care signed (Y/N): []  Yes [x]  No     Date of Patient follow up with Physician:      Progress Report: []  Yes  [x]  No     Date Range for reporting period:  Beginnin2022  Ending:     Progress report due (10 Rx/or 30 days whichever is less): visit #10 or       Recertification due (POC duration/ or 90 days whichever is less): visit #12 or 3/6/23 (date)     Visit # Insurance Allowable Auth required? Date Range    20- hard max []  Yes  [x]  No 22-     Latex Allergy:  [x]NO      []YES  Preferred Language for Healthcare:   []English       [x]other: Divehi-- required; used video  this date    Functional Scale:           Date assessed:  FOTO physical FS primary measure score = 67; risk adjusted = 49  22    Pain level:  5/10     SUBJECTIVE:  pain in the left shoulder is keeping her up at night. Not taking any medication at this point to manage. Uses Icy Hot patches sometimes. Has finished her steroid taper    OBJECTIVE: : AROM into flex WFL with painful arc at 90; abd to 80 deg in standing actively.  IR/ER WFL and pain free      RESTRICTIONS/PRECAUTIONS: HTN    Exercises/Interventions:     Therapeutic Exercises (23295) Resistance / level Sets/sec Reps Notes   UBE-fwd, bwd   NV    Pullies-flex, abd   X 15 each    Scap squeeze with green TB  1 15       BUE ER with orange TB   X 15 SB  -BUE flexion  -table rolls     X 15    Slides on stair rails with towel   Flex and abd x 15 each on L    Codman's   X 20 each on left    Shrugs and rolls   X 15 L each    Forward raise to 90  Lateral raise to 90 1#  1#  X 10   X 10     Therapeutic Activities (90775)       Shelf taps       Mod plank on table with push ups   X 10    Patient ed: discussed impingement signs; discussed postural adjustments, sternal lift posture to decrease shoulder strain   x                                              Neuromuscular Re-ed (92106)       Wall ball                                          Manual Intervention (19362)       LUE distraction       L GHJ mob -posterior, inferior                            ktape   x UT inhib; scap retract; patient advised to remove if skin irritation or any symptoms worsening; verbalized understanding       Modalities:     Pt. Education:  12/06/2022  -patient educated on diagnosis, prognosis and expectations for rehab  -all patient questions were answered  -educated on postural awareness with all shoulder mobility in order to reduce pain with LUE movement. Home Exercise Program:  Access Code: [email or web address]  URL: [email or web address]  Date: 12/06/2022  Prepared by: Antwan Patel    Exercises  Seated Scapular Retraction - 1 x daily - 7 x weekly - 1 sets - 10 reps - 5 sec hold      Therapeutic Exercise and NMR EXR  [x] (01700) Provided verbal/tactile cueing for activities related to strengthening, flexibility, endurance, ROM for improvements in  [] LE / Lumbar: LE, proximal hip, and core control with self care, mobility, lifting, ambulation.   [] UE / Cervical: cervical, postural, scapular, scapulothoracic and UE control with self care, reaching, carrying, lifting, house/yardwork, driving, computer work.  [] (97042) Provided verbal/tactile cueing for activities related to improving balance, coordination, kinesthetic sense, posture, motor skill, proprioception to assist with   [] LE / lumbar: LE, proximal hip, and core control in self care, mobility, lifting, ambulation and eccentric single leg control. [] UE / cervical: cervical, scapular, scapulothoracic and UE control with self care, reaching, carrying, lifting, house/yardwork, driving, computer work.   [] (91061) Therapist is in constant attendance of 2 or more patients providing skilled therapy interventions, but not providing any significant amount of measurable one-on-one time to either patient, for improvements in  [] LE / lumbar: LE, proximal hip, and core control in self care, mobility, lifting, ambulation and eccentric single leg control. [] UE / cervical: cervical, scapular, scapulothoracic and UE control with self care, reaching, carrying, lifting, house/yardwork, driving, computer work. NMR and Therapeutic Activities:    [] (17634 or 95942) Provided verbal/tactile cueing for activities related to improving balance, coordination, kinesthetic sense, posture, motor skill, proprioception and motor activation to allow for proper function of   [] LE: / Lumbar core, proximal hip and LE with self care and ADLs  [] UE / Cervical: cervical, postural, scapular, scapulothoracic and UE control with self care, carrying, lifting, driving, computer work.   [] (10640) Gait Re-education- Provided training and instruction to the patient for proper LE, core and proximal hip recruitment and positioning and eccentric body weight control with ambulation re-education including up and down stairs     Home Management Training / Self Care:  [] (27957) Provided self-care/home management training related to activities of daily living and compensatory training, and/or use of adaptive equipment for improvement with: ADLs and compensatory training, meal preparation, safety procedures and instruction in use of adaptive equipment, including bathing, grooming, dressing, personal hygiene, basic household cleaning and chores.      Home Exercise Program:    [x] (54580) Reviewed/Progressed HEP activities related to strengthening, flexibility, endurance, ROM of   [] LE / Lumbar: core, proximal hip and LE for functional self-care, mobility, lifting and ambulation/stair navigation   [x] UE / Cervical: cervical, postural, scapular, scapulothoracic and UE control with self care, reaching, carrying, lifting, house/yardwork, driving, computer work  [] (71704)Reviewed/Progressed HEP activities related to improving balance, coordination, kinesthetic sense, posture, motor skill, proprioception of   [] LE: core, proximal hip and LE for self care, mobility, lifting, and ambulation/stair navigation    [] UE / Cervical: cervical, postural,  scapular, scapulothoracic and UE control with self care, reaching, carrying, lifting, house/yardwork, driving, computer work    Manual Treatments:  PROM / STM / Oscillations-Mobs:  G-I, II, III, IV (PA's, Inf., Post.)  [x] (91731) Provided manual therapy to mobilize LE, proximal hip and/or LS spine soft tissue/joints for the purpose of modulating pain, promoting relaxation,  increasing ROM, reducing/eliminating soft tissue swelling/inflammation/restriction, improving soft tissue extensibility and allowing for proper ROM for normal function with   [] LE / lumbar: self care, mobility, lifting and ambulation. [x] UE / Cervical: self care, reaching, carrying, lifting, house/yardwork, driving, computer work. Modalities:  [] (52608) Vasopneumatic compression: Utilized vasopneumatic compression to decrease edema / swelling for the purpose of improving mobility and quad tone / recruitment which will allow for increased overall function including but not limited to self-care, transfers, ambulation, and ascending / descending stairs.        Charges:  Timed Code Treatment Minutes: 45   Total Treatment Minutes: 45     [] EVAL - LOW (38705)   [] EVAL - MOD (88300)  [] EVAL - HIGH (65100)  [] RE-EVAL (09509)  [x] LP(72869) x   2    [] Ionto  [] NMR (19687) x [] Vaso  [x] Manual (90580) x  1     [] Ultrasound  [] TA x        [] Mech Traction (98255)  [] Aquatic Therapy x     [] ES (un) (54756):   [] Home Management Training x  [] ES(attended) (47955)   [] Dry Needling 1-2 muscles (59353):  [] Dry Needling 3+ muscles (905675)  [] Group:      [] Other:     GOALS:   Patient stated goal: \"no pain\"  [] Progressing: [] Met: [] Not Met: [] Adjusted     Therapist goals for Patient:   Short Term Goals: To be achieved in: 2 weeks  1. Independent in HEP and progression per patient tolerance, in order to prevent re-injury. [] Progressing: [] Met: [] Not Met: [] Adjusted  2. Patient will have a decrease in pain to facilitate improvement in movement, function, and ADLs as indicated by Functional Deficits. [] Progressing: [] Met: [] Not Met: [] Adjusted     Long Term Goals: To be achieved in: 6 weeks  1. FOTO score of at least 69 to assist with reaching prior level of function. [] Progressing: [] Met: [] Not Met: [] Adjusted  2. Patient will demonstrate increased L shoulder flexion AROM to 170 deg to allow for proper joint functioning as indicated by patients Functional Deficits. [] Progressing: [] Met: [] Not Met: [] Adjusted  3. Patient will demonstrate an increase in R shoulder Strength to 5 to allow for proper functional mobility as indicated by patients Functional Deficits. [] Progressing: [] Met: [] Not Met: [] Adjusted  4. Patient will return to functional activities including reaching [place] without increased symptoms or restriction. [] Progressing: [] Met: [] Not Met: [] Adjusted  5. Pt will be able to lift box OH without increased pain and demonstrate improve ergonomics. [] Progressing: [] Met: [] Not Met: [] Adjusted     Overall Progression Towards Functional goals/ Treatment Progress Update:  [] Patient is progressing as expected towards functional goals listed. [] Progression is slowed due to complexities/Impairments listed.   [] Progression has been slowed due to co-morbidities. [x] Plan just implemented, too soon to assess goals progression <30days   [] Goals require adjustment due to lack of progress  [] Patient is not progressing as expected and requires additional follow up with physician  [] Other    Persisting Functional Limitations/Impairments:  [x]Sleeping []Sitting               []Standing []Transfers        []Walking []Kneeling               []Stairs []Squatting / bending   [x]ADLs [x]Reaching  [x]Lifting  [x]Housework  [x]Driving [x]Job related tasks  []Sports/Recreation []Other:        ASSESSMENT:  s/s consistent with impingement syndrome; patient motivated  Treatment/Activity Tolerance:  [x] Patient able to complete tx [] Patient limited by fatigue  [x] Patient limited by pain  [] Patient limited by other medical complications  [] Other:     Prognosis: [x] Good [] Fair  [] Poor    Patient Requires Follow-up: [x] Yes  [] No    Plan for next treatment session:post cuff strengthening; postural adjustments    PLAN: See eval. PT 2x / week for 6 weeks. [x] Continue per plan of care [] Alter current plan (see comments)  [] Plan of care initiated [] Hold pending MD visit [] Discharge    Electronically signed by: Corrina Chun, PT, DPT    Note: If patient does not return for scheduled/ recommended follow up visits, this note will serve as a discharge from care along with most recent update on progress.

## 2022-12-20 ENCOUNTER — OFFICE VISIT (OUTPATIENT)
Dept: INTERNAL MEDICINE CLINIC | Age: 67
End: 2022-12-20
Payer: COMMERCIAL

## 2022-12-20 VITALS
HEART RATE: 76 BPM | TEMPERATURE: 97.3 F | OXYGEN SATURATION: 97 % | SYSTOLIC BLOOD PRESSURE: 130 MMHG | BODY MASS INDEX: 31.47 KG/M2 | WEIGHT: 171 LBS | HEIGHT: 62 IN | DIASTOLIC BLOOD PRESSURE: 78 MMHG

## 2022-12-20 DIAGNOSIS — Z23 NEED FOR PNEUMOCOCCAL VACCINATION: ICD-10-CM

## 2022-12-20 DIAGNOSIS — M25.512 CHRONIC LEFT SHOULDER PAIN: Primary | ICD-10-CM

## 2022-12-20 DIAGNOSIS — F32.A DEPRESSION, UNSPECIFIED DEPRESSION TYPE: ICD-10-CM

## 2022-12-20 DIAGNOSIS — G89.29 CHRONIC LEFT SHOULDER PAIN: Primary | ICD-10-CM

## 2022-12-20 PROCEDURE — G8484 FLU IMMUNIZE NO ADMIN: HCPCS | Performed by: NURSE PRACTITIONER

## 2022-12-20 PROCEDURE — G8400 PT W/DXA NO RESULTS DOC: HCPCS | Performed by: NURSE PRACTITIONER

## 2022-12-20 PROCEDURE — 3017F COLORECTAL CA SCREEN DOC REV: CPT | Performed by: NURSE PRACTITIONER

## 2022-12-20 PROCEDURE — G8417 CALC BMI ABV UP PARAM F/U: HCPCS | Performed by: NURSE PRACTITIONER

## 2022-12-20 PROCEDURE — 90677 PCV20 VACCINE IM: CPT | Performed by: NURSE PRACTITIONER

## 2022-12-20 PROCEDURE — 1036F TOBACCO NON-USER: CPT | Performed by: NURSE PRACTITIONER

## 2022-12-20 PROCEDURE — 1090F PRES/ABSN URINE INCON ASSESS: CPT | Performed by: NURSE PRACTITIONER

## 2022-12-20 PROCEDURE — 99214 OFFICE O/P EST MOD 30 MIN: CPT | Performed by: NURSE PRACTITIONER

## 2022-12-20 PROCEDURE — G8427 DOCREV CUR MEDS BY ELIG CLIN: HCPCS | Performed by: NURSE PRACTITIONER

## 2022-12-20 PROCEDURE — 1123F ACP DISCUSS/DSCN MKR DOCD: CPT | Performed by: NURSE PRACTITIONER

## 2022-12-20 PROCEDURE — 90471 IMMUNIZATION ADMIN: CPT | Performed by: NURSE PRACTITIONER

## 2022-12-20 RX ORDER — DULOXETIN HYDROCHLORIDE 30 MG/1
30 CAPSULE, DELAYED RELEASE ORAL DAILY
Qty: 30 CAPSULE | Refills: 5 | Status: SHIPPED | OUTPATIENT
Start: 2022-12-20

## 2022-12-20 NOTE — PROGRESS NOTES
SUBJECTIVE:    Patient ID: Siomara Holland is a 78 y.o. female. CC: Shoulder pain, back pain,     HPI: The patient presents to the office today for follow-up of ongoing medical problems. Patient established as a new patient with our practice about 1 month ago. At that time, she had a number of healthcare concerns and chronic medical conditions. She returns today with continued issues including:    Bilateral shoulder pain, worse on left. Worse with lifting. Mid back pain  Lung pain. Left leg pain. Depression  Feet cold      Past Medical History:   Diagnosis Date    Hypertension         Current Outpatient Medications   Medication Sig Dispense Refill    acetaminophen (TYLENOL) 500 MG tablet Take 1,000 mg by mouth every 6 hours as needed for Pain      aspirin 81 MG chewable tablet 81 mg daily      ibuprofen (ADVIL;MOTRIN) 600 MG tablet Take 1 tablet by mouth every 6 hours as needed for Pain (Patient not taking: Reported on 12/20/2022) 20 tablet 0     No current facility-administered medications for this visit. Review of Systems   Constitutional:  Positive for fatigue. Negative for chills and fever. Respiratory:          \"Lung pain\"   Cardiovascular: Negative. Gastrointestinal: Negative. Genitourinary: Negative. Musculoskeletal:  Positive for arthralgias, back pain and myalgias. Neurological: Negative. Hematological: Negative. Psychiatric/Behavioral:  Positive for dysphoric mood. OBJECTIVE:  Physical Exam  Vitals reviewed. Constitutional:       General: She is not in acute distress. Appearance: She is well-developed. She is not diaphoretic. HENT:      Head: Normocephalic and atraumatic. Eyes:      General: No scleral icterus. Conjunctiva/sclera: Conjunctivae normal.   Neck:      Vascular: No JVD. Cardiovascular:      Rate and Rhythm: Normal rate and regular rhythm. Pulmonary:      Effort: Pulmonary effort is normal. No respiratory distress.       Breath sounds: Normal breath sounds. No wheezing. Abdominal:      General: There is no distension. Palpations: Abdomen is soft. Tenderness: There is no abdominal tenderness. There is no guarding or rebound. Musculoskeletal:         General: Normal range of motion. Cervical back: Neck supple. Skin:     General: Skin is warm and dry. Neurological:      Mental Status: She is alert and oriented to person, place, and time. Psychiatric:         Mood and Affect: Mood is depressed. Speech: Speech normal.      /78   Pulse 76   Temp 97.3 °F (36.3 °C)   Ht 5' 2\" (1.575 m)   Wt 171 lb (77.6 kg)   SpO2 97%   BMI 31.28 kg/m²      PHQ Scores 11/15/2022   PHQ2 Score 2   PHQ9 Score 2     Interpretation of Total Score Depression Severity: 1-4 = Minimal depression, 5-9 = Mild depression, 10-14 = Moderate depression, 15-19 = Moderately severe depression, 20-27 =Severe depression        ASSESSMENT/PLAN:  Karolina Or was seen today for follow-up, shoulder pain and follow-up from hospital.    Diagnoses and all orders for this visit:    Chronic left shoulder pain  -     Lilly Nugent MD, Orthopedics and Sports Medicine (Knee; Shoulder; Elbow; Hip; Trauma), [facility]    Depression, unspecified depression type  -     DULoxetine (CYMBALTA) 30 MG extended release capsule; Take 1 capsule by mouth daily  -     Ambulatory referral to Psychology    Need for pneumococcal vaccination  -     Pneumococcal, PCV20, PREVNAR 20, (age 25 yrs+), IM, PF    A number of new and chronic medical conditions. Most of her complaints are musculoskeletal aches and pains. Patient remains a poor historian. Language barrier also is problematic despite use of an . I feel depression is playing a role. Patient admits her mood is poor. After discussion with the patient, I will place her on Cymbalta with the hopes that this will help both her mood as well as musculoskeletal aches and pains.   Left shoulder seems to be the main issue. She has decreased range of motion. Previously recommended physical therapy which she is seeing. Will refer to orthopedics. She will also be referred to psychology for depression. I have asked her to return in about 4-6 weeks for follow-up of Cymbalta.       Over 30 minutes was spent with the patient today as follows:   Preparing to see the patient (e.g., review of tests)  Obtaining and/or reviewing separately obtained history  Performing a medically appropriate examination and/or evaluation  Counseling and educating the patient/family/caregiver  Ordering medications, tests or procedures  Documenting clinical information in the electronic health record        DUANE Patten - CNP

## 2022-12-21 ENCOUNTER — HOSPITAL ENCOUNTER (OUTPATIENT)
Dept: PHYSICAL THERAPY | Age: 67
Setting detail: THERAPIES SERIES
Discharge: HOME | End: 2022-12-21
Payer: COMMERCIAL

## 2023-01-02 ASSESSMENT — ENCOUNTER SYMPTOMS
BACK PAIN: 1
GASTROINTESTINAL NEGATIVE: 1

## 2023-01-07 ENCOUNTER — HOSPITAL ENCOUNTER (OUTPATIENT)
Dept: PHYSICAL THERAPY | Age: 68
Setting detail: THERAPIES SERIES
Discharge: HOME | End: 2023-01-07

## 2023-01-07 NOTE — FLOWSHEET NOTE
[address] [place] [address] [employer]     Physical Therapy  Cancellation/No-show Note  Patient Name:  Nita Cisneros  :  1955   Date:  2023  Cancelled visits to date: 0  No-shows to date: 2      Patient status for today's appointment patient:  []  Cancelled  []  Rescheduled appointment  [x]  No-show - ,      Reason given by patient:  []  Patient ill  []  Conflicting appointment  []  No transportation    []  Conflict with work  [x]  No reason given  []  Other:     Comments:      Phone call information:   []  Phone call made today to patient at _ time at number provided:      []  Patient answered, conversation as follows:    []  Patient did not answer, message left as follows:  [x]  Phone call not made today - last scheduled visit   []  Phone call not needed - pt contacted us to cancel and provided reason for cancellation.      Electronically signed by:  Pablito Little, PT, DPT

## 2023-01-09 ENCOUNTER — OFFICE VISIT (OUTPATIENT)
Dept: PSYCHOLOGY | Age: 68
End: 2023-01-09

## 2023-01-09 DIAGNOSIS — F33.1 MODERATE EPISODE OF RECURRENT MAJOR DEPRESSIVE DISORDER (HCC): Primary | ICD-10-CM

## 2023-01-09 ASSESSMENT — PATIENT HEALTH QUESTIONNAIRE - PHQ9
1. LITTLE INTEREST OR PLEASURE IN DOING THINGS: 0
3. TROUBLE FALLING OR STAYING ASLEEP: 1
2. FEELING DOWN, DEPRESSED OR HOPELESS: 1
SUM OF ALL RESPONSES TO PHQ9 QUESTIONS 1 & 2: 1
9. THOUGHTS THAT YOU WOULD BE BETTER OFF DEAD, OR OF HURTING YOURSELF: 0
SUM OF ALL RESPONSES TO PHQ QUESTIONS 1-9: 8
5. POOR APPETITE OR OVEREATING: 0
SUM OF ALL RESPONSES TO PHQ QUESTIONS 1-9: 8
10. IF YOU CHECKED OFF ANY PROBLEMS, HOW DIFFICULT HAVE THESE PROBLEMS MADE IT FOR YOU TO DO YOUR WORK, TAKE CARE OF THINGS AT HOME, OR GET ALONG WITH OTHER PEOPLE: 2
8. MOVING OR SPEAKING SO SLOWLY THAT OTHER PEOPLE COULD HAVE NOTICED. OR THE OPPOSITE, BEING SO FIGETY OR RESTLESS THAT YOU HAVE BEEN MOVING AROUND A LOT MORE THAN USUAL: 1
6. FEELING BAD ABOUT YOURSELF - OR THAT YOU ARE A FAILURE OR HAVE LET YOURSELF OR YOUR FAMILY DOWN: 2
4. FEELING TIRED OR HAVING LITTLE ENERGY: 1
SUM OF ALL RESPONSES TO PHQ QUESTIONS 1-9: 8
SUM OF ALL RESPONSES TO PHQ QUESTIONS 1-9: 8
7. TROUBLE CONCENTRATING ON THINGS, SUCH AS READING THE NEWSPAPER OR WATCHING TELEVISION: 2

## 2023-01-09 NOTE — PROGRESS NOTES
Behavioral Health Consultation  Marnie Ordonez M.A. Psychology Assistant   Abimael Alvarez, Ph.D. Supervising Psychologist  1/9/2023  8:26 AM      Time spent with Patient: 35 minutes     This is patient's first [facility] appointment. Reason for Consult:  depression     present    Pt provided informed consent for the behavioral health program. Discussed with patient model of service to include the limits of confidentiality (i.e. abuse reporting, suicide intervention, etc.) and short-term intervention focused approach. Reviewed provision of services under supervision of licensed psychologist and obtained written consent. Pt indicated understanding. Feedback given to PCP. S:  Pt seen per PCP re: depression. Patient reports depressive symptoms, including depressed mood, deactivation, anhedonia, poor self-worth, social isolation, fatigue, psychomotor retardation, and poor concentration/focus. Pt reports symptoms of anxiety, including racing thoughts, irritability, poor concentration/focus, and fatigue. Pt also reports SOB, shakiness, dizziness, and GI distress. Pt noted sxs have been present approximately 4 years. She noted she is no longer close with her family members that live her as they have not treated her well. Pt noted growing old, frequent sicknesses, and not having someone to share her time with has contributed to feeling depressed; she feels lonely. Pt noted several physical ailments. She noted several instances where she has gone to the hospital and they will not find anything wrong or provide a solution. Pt reported she does not go out or have social activities; does not feel like doing these. Pt noted she used to make an effort in terms of maintaining her house, but does not feel motivated now; she would rather lay down or not do much. Discussed the depression fatigue cycle.      Pt noted she has struggled with remembering what she has read; she has tried to learn English 3 times now, but noted she is too forgetful and that it does not stick. She feels as though she is \"going backwards\". Pt lives alone. Pt works at a [employer]. She notes she does not particularly enjoy this, but feels as though she must continue. EtOH: no   Nicotine: no   Recreational Use: none   Caffeine: 2 cups coffee/day.      Past Mental Health treatment: counselor once     O:  MSE:    Appearance    alert, cooperative, tearful   Impulsive behavior No  Speech    normal rate and normal volume  Mood    Depressed  Affect    depressed affect  Thought Content    intact  Thought Process    linear and coherent  Associations    logical connections  Insight    Good  Judgment    Intact  Orientation    oriented to person, place, time, and general circumstances  Memory    recent and remote memory intact  Attention/Concentration    intact  Morbid ideation No  Suicide Assessment    no suicidal ideation    History:    Social History:   Social History     Socioeconomic History    Marital status:      Spouse name: Not on file    Number of children: Not on file    Years of education: Not on file    Highest education level: Not on file   Occupational History    Not on file   Tobacco Use    Smoking status: Never    Smokeless tobacco: Never   Vaping Use    Vaping Use: Never used   Substance and Sexual Activity    Alcohol use: No    Drug use: No     Frequency: 3.0 times per week    Sexual activity: Not on file   Other Topics Concern    Not on file   Social History Narrative    Not on file     Social Determinants of Health     Financial Resource Strain: Low Risk     Difficulty of Paying Living Expenses: Not hard at all   Food Insecurity: No Food Insecurity    Worried About Running Out of Food in the Last Year: Never true    Ran Out of Food in the Last Year: Never true   Transportation Needs: Not on file   Physical Activity: Not on file   Stress: Not on file   Social Connections: Not on file   Intimate Partner Violence: Not on file Housing Stability: Not on file     TOBACCO:   reports that she has never smoked. She has never used smokeless tobacco.  ETOH:   reports no history of alcohol use. A:  Administered PHQ-9 (see below). Patient endorses mild symptoms of depression.      PHQ Scores 1/9/2023 11/15/2022   PHQ2 Score 1 2   PHQ9 Score 8 2     Interpretation of Total Score Depression Severity: 1-4 = Minimal depression, 5-9 = Mild depression, 10-14 = Moderate depression, 15-19 = Moderately severe depression, 20-27 = Severe depression    Diagnosis:  Major depressive disorder; recurrent and moderate  R/O CLAUDIA    Plan:  Pt interventions:  Discussed various factors related to the development and maintenance of  depression (including biological, cognitive, behavioral, and environmental factors), Established rapport, [place]-setting to identify pt's primary goals for [facility] visit / overall health, and Supportive techniques

## 2023-01-14 ENCOUNTER — HOSPITAL ENCOUNTER (OUTPATIENT)
Dept: WOMENS IMAGING | Age: 68
Discharge: HOME | End: 2023-01-14
Payer: COMMERCIAL

## 2023-01-14 VITALS — HEIGHT: 60 IN | BODY MASS INDEX: 33.18 KG/M2 | WEIGHT: 169 LBS

## 2023-01-14 DIAGNOSIS — Z12.31 ENCOUNTER FOR SCREENING MAMMOGRAM FOR BREAST CANCER: ICD-10-CM

## 2023-01-14 PROCEDURE — 77067 SCR MAMMO BI INCL CAD: CPT

## 2023-01-23 ENCOUNTER — HOSPITAL ENCOUNTER (OUTPATIENT)
Dept: PHYSICAL THERAPY | Age: 68
Setting detail: THERAPIES SERIES
Discharge: HOME | End: 2023-01-23
Payer: COMMERCIAL

## 2023-01-23 PROCEDURE — 97110 THERAPEUTIC EXERCISES: CPT

## 2023-01-23 PROCEDURE — 97530 THERAPEUTIC ACTIVITIES: CPT

## 2023-01-23 NOTE — PLAN OF CARE
[facility]  Phone: [phone]   Fax: [phone]  Physical Therapy Re-Certification Plan of Care    Dear DUANE Matos*  ,    We had the pleasure of treating the following patient for physical therapy services at [facility]. A summary of our findings can be found in the updated assessment below. This includes our plan of care. If you have any questions or concerns regarding these findings, please do not hesitate to contact me at the office phone number checked above. Thank you for the referral.     Physician Signature:________________________________Date:__________________  By signing above (or electronic signature), therapist's plan is approved by physician      Functional Outcome:     FOTO:  51             PROM AROM     L R L R   Shoulder Flexion      155* 172   Shoulder Abduction      148* painful arc 180   Shoulder External Rotation      88* 100+   Shoulder Internal Rotation      L1* 70+         Strength (0-5) Left Right    Shoulder Shrug (C4) 4+ 4+   Shoulder Flex 4-* 4+   Shoulder Abd (C5) 3+* 4-   Shoulder ER 4-* 4   Shoulder IR 4* 4+   Biceps (C6) 5 5   Triceps (C7) 5 5     Overall Response to Treatment:   []Patient is responding well to treatment and improvement is noted with regards  to goals   []Patient should continue to improve in reasonable time if they continue HEP   []Patient has plateaued and is no longer responding to skilled PT intervention    []Patient is getting worse and would benefit from return to referring MD   []Patient unable to adhere to initial POC   [x]Other: Pt returns to PT after a month off due to not knowing about her appts per her report. Pt cont to present with significant L UE pain, ROM/strength limitations, and mobility deficits. 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 Extensive pt education provided this date for referral to ortho, (office number provided to keely), [address] presentation, possibility for lack of damage or presents of damage, interventions planned with PT, likely options to be provided by ortho MD. Extended time needed secondary to Armenian interpretation required. POC updated and encouraged to schedule with ortho team for future visits after next 2 scheduled. Pt stating she will schedule more when keely is available and will also schedule ortho appt. Pt to benefit from skilled PT at this time to address deficits. Be aware of 20 visit limitation, if pt does not improve in function/pain/motion in next 2-3 weeks encourage MD appt to determine if any surgical interventions will be required. Date range of Visits: 22-23  Total Visits: 3    Recommendation:    [x]Continue PT 1-2x / wk for 6-8 weeks. []Hold PT, pending MD visit      Physical Therapy Daily Treatment Note    Date:  2023     Patient Name:  Johan Tsai    :  1955  MRN: [id number]  Medical Diagnosis:  Impingement syndrome of left shoulder [M75.42]  Acute pain of left shoulder [M25.512]  Treatment Diagnosis: L shoulder pain, decreased LUE strength, decreased LUE AROM, difficulty reaching OH    Insurance/Certification information:  PT Insurance Information: [employer]  Physician Information:  DUANE Guardado  Plan of care signed (Y/N): []  Yes [x]  No     Date of Patient follow up with Physician:      Progress Report: [x]  Yes  []  No     Date Range for reporting period:  Beginnin2022  POC: 23  Ending:     Progress report due (10 Rx/or 30 days whichever is less): visit #10 or 95      Recertification due (POC duration/ or 90 days whichever is less): visit #16 or 3/27/23 (8 weeks)     Visit # Insurance Allowable Auth required?  Date Range   1 - hard max  2 used  []  Yes  [x]  No PCY     Latex Allergy:  [x]NO      []YES  Preferred Language for Healthcare:   []English       [x]other: Sammarinese-- required; used video  this date    Functional Scale:           Date assessed:  FOTO physical FS primary measure score = 67; risk adjusted = 49  12/6/22  FOTO physical FS primary measure score = 51     1/23/23    Pain level:  0/10 at rest  Rates 10/10 at night - unable to sleep     SUBJECTIVE:  Pt states her both of her shoulders hurt but L is worse than R. States the pain is very severe and keeps her up at night constantly. The pain happens every night. She states she also has a cracking sound when she moves the left shoulder. States she is taking 2 pain meds from MD but nothing is helping. She missed her PT appts because she didn't know she had them, so now she returns with a new schedule. Pt states no pain with resting, but lifting and sleeping is painful. She states certain movements hurt worse such as lifting it up towards overhead, or holding weight as she lifts. She is in PT with referral from PCP, however the PCP wanted her to see an orthopedic MD. States she hasn't made it yet because she had other appts to get to and was doing one at a time. She is requesting a referral for an ortho MD in this building. She states her niece makes all her appts. OBJECTIVE: 12/17: AROM into flex WFL with painful arc at 90; abd to 80 deg in standing actively.  IR/ER WFL and pain free      RESTRICTIONS/PRECAUTIONS: HTN    Exercises/Interventions:     Therapeutic Exercises (86047) Resistance / level Sets/sec Reps Notes   Pullies-flex, abd       Table slides - flex  5\" 10    Supine AAROM Sh flex Opp UE assist 3\" 10    Wall slides   3\" 10 Cues to avoid early SH shrug          Scap squeeze   5\" 10    TB - mid row       TB - B SH ext        Chest press       Serratus punch                      Therapeutic Activities (65043)       Shelf taps       Mod plank on table with push ups       POC assessment, patient education, see assessment   x25' Neuromuscular Re-ed (23876)       SH ISOs Flex/AB 5\" 10                                       Manual Intervention (42607)       LUE distraction       L GHJ mob -posterior, inferior              L UE PROM               ktape   x UT inhib; scap retract; patient advised to remove if skin irritation or any symptoms worsening; verbalized understanding       Modalities:     Pt. Education:  12/06/2022  -patient educated on diagnosis, prognosis and expectations for rehab  -all patient questions were answered  -educated on postural awareness with all shoulder mobility in order to reduce pain with LUE movement. Home Exercise Program:    Access Code: [email or web address]  URL: [email or web address]  Date: 01/23/2023  Prepared by: Darvin Cuellar    Exercises  Supine Shoulder Flexion AAROM - 1 x daily - 7 x weekly - 3 sets - 10 reps  Seated Shoulder Flexion Towel Slide at Table Top - 1 x daily - 7 x weekly - 3 sets - 10 reps  Shoulder Flexion Wall Slide with Towel - 1 x daily - 7 x weekly - 3 sets - 10 reps  Seated Scapular Retraction - 1 x daily - 7 x weekly - 1 sets - 10 reps - 5 sec hold  Isometric Shoulder Abduction at Wall - 1 x daily - 7 x weekly - 3 sets - 10 reps  Isometric Shoulder External Rotation at Wall - 1 x daily - 7 x weekly - 3 sets - 10 reps  Isometric Shoulder Flexion at Wall - 1 x daily - 7 x weekly - 3 sets - 10 reps      Therapeutic Exercise and NMR EXR  [x] (77259) Provided verbal/tactile cueing for activities related to strengthening, flexibility, endurance, ROM for improvements in  [] LE / Lumbar: LE, proximal hip, and core control with self care, mobility, lifting, ambulation.   [] UE / Cervical: cervical, postural, scapular, scapulothoracic and UE control with self care, reaching, carrying, lifting, house/yardwork, driving, computer work.  [] (20832) Provided verbal/tactile cueing for activities related to improving balance, coordination, kinesthetic sense, posture, motor skill, proprioception to assist with   [] LE / lumbar: LE, proximal hip, and core control in self care, mobility, lifting, ambulation and eccentric single leg control. [] UE / cervical: cervical, scapular, scapulothoracic and UE control with self care, reaching, carrying, lifting, house/yardwork, driving, computer work.   [] (85387) Therapist is in constant attendance of 2 or more patients providing skilled therapy interventions, but not providing any significant amount of measurable one-on-one time to either patient, for improvements in  [] LE / lumbar: LE, proximal hip, and core control in self care, mobility, lifting, ambulation and eccentric single leg control. [] UE / cervical: cervical, scapular, scapulothoracic and UE control with self care, reaching, carrying, lifting, house/yardwork, driving, computer work.      NMR and Therapeutic Activities:    [] (53498 or 55635) Provided verbal/tactile cueing for activities related to improving balance, coordination, kinesthetic sense, posture, motor skill, proprioception and motor activation to allow for proper function of   [] LE: / Lumbar core, proximal hip and LE with self care and ADLs  [] UE / Cervical: cervical, postural, scapular, scapulothoracic and UE control with self care, carrying, lifting, driving, computer work.   [] (75832) Gait Re-education- Provided training and instruction to the patient for proper LE, core and proximal hip recruitment and positioning and eccentric body weight control with ambulation re-education including up and down stairs     Home Management Training / Self Care:  [] (05660) Provided self-care/home management training related to activities of daily living and compensatory training, and/or use of adaptive equipment for improvement with: ADLs and compensatory training, meal preparation, safety procedures and instruction in use of adaptive equipment, including bathing, grooming, dressing, personal hygiene, basic household cleaning and chores. Home Exercise Program:    [x] (61493) Reviewed/Progressed HEP activities related to strengthening, flexibility, endurance, ROM of   [] LE / Lumbar: core, proximal hip and LE for functional self-care, mobility, lifting and ambulation/stair navigation   [x] UE / Cervical: cervical, postural, scapular, scapulothoracic and UE control with self care, reaching, carrying, lifting, house/yardwork, driving, computer work  [] (00838)Reviewed/Progressed HEP activities related to improving balance, coordination, kinesthetic sense, posture, motor skill, proprioception of   [] LE: core, proximal hip and LE for self care, mobility, lifting, and ambulation/stair navigation    [] UE / Cervical: cervical, postural,  scapular, scapulothoracic and UE control with self care, reaching, carrying, lifting, house/yardwork, driving, computer work    Manual Treatments:  PROM / STM / Oscillations-Mobs:  G-I, II, III, IV (PA's, Inf., Post.)  [x] (68078) Provided manual therapy to mobilize LE, proximal hip and/or LS spine soft tissue/joints for the purpose of modulating pain, promoting relaxation,  increasing ROM, reducing/eliminating soft tissue swelling/inflammation/restriction, improving soft tissue extensibility and allowing for proper ROM for normal function with   [] LE / lumbar: self care, mobility, lifting and ambulation. [x] UE / Cervical: self care, reaching, carrying, lifting, house/yardwork, driving, computer work. Modalities:  [] (79571) Vasopneumatic compression: Utilized vasopneumatic compression to decrease edema / swelling for the purpose of improving mobility and quad tone / recruitment which will allow for increased overall function including but not limited to self-care, transfers, ambulation, and ascending / descending stairs.        Charges:  Timed Code Treatment Minutes: 55   Total Treatment Minutes: 55     [] EVAL - LOW (15811)   [] EVAL - MOD (61866)  [] EVAL - HIGH (74553)  [] [place] (49051)  [x] IU(42070) x 2    [] Ionto  [] NMR (00795) x       [] Vaso  [] Manual (64001) x  1     [] Ultrasound  [x] TA x 2        [] Mech Traction (82054)  [] Aquatic Therapy x     [] ES (un) (68157):   [] Home Management Training x  [] ES(attended) (78643)   [] Dry Needling 1-2 muscles (20208):  [] Dry Needling 3+ muscles (068822)  [] Group:      [] Other:     GOALS:   Patient stated goal: \"no pain\"  [] Progressing: [] Met: [] Not Met: [] Adjusted     Therapist goals for Patient:   Short Term Goals: To be achieved in: 2 weeks  1. Independent in HEP and progression per patient tolerance, in order to prevent re-injury. [] Progressing: [] Met: [] Not Met: [] Adjusted  2. Patient will have a decrease in pain to facilitate improvement in movement, function, and ADLs as indicated by Functional Deficits. [] Progressing: [] Met: [] Not Met: [] Adjusted     Long Term Goals: To be achieved in: 6 weeks  1. FOTO score of at least 69 to assist with reaching prior level of function. [] Progressing: [] Met: [] Not Met: [] Adjusted  2. Patient will demonstrate increased L shoulder flexion AROM to 170 deg to allow for proper joint functioning as indicated by patients Functional Deficits. [] Progressing: [] Met: [] Not Met: [] Adjusted  3. Patient will demonstrate an increase in R shoulder Strength to 5 to allow for proper functional mobility as indicated by patients Functional Deficits. [] Progressing: [] Met: [] Not Met: [] Adjusted  4. Patient will return to functional activities including reaching [place] without increased symptoms or restriction. [] Progressing: [] Met: [] Not Met: [] Adjusted  5. Pt will be able to lift box OH without increased pain and demonstrate improve ergonomics. [] Progressing: [] Met: [] Not Met: [] Adjusted     Overall Progression Towards Functional goals/ Treatment Progress Update:  [] Patient is progressing as expected towards functional goals listed.     [] Progression is slowed due to complexities/Impairments listed. [] Progression has been slowed due to co-morbidities. [x] Plan just implemented, too soon to assess goals progression <30days   [] Goals require adjustment due to lack of progress  [] Patient is not progressing as expected and requires additional follow up with physician  [] Other    Persisting Functional Limitations/Impairments:  [x]Sleeping []Sitting               []Standing []Transfers        []Walking []Kneeling               []Stairs []Squatting / bending   [x]ADLs [x]Reaching  [x]Lifting  [x]Housework  [x]Driving [x]Job related tasks  []Sports/Recreation []Other:        ASSESSMENT:  See POC Above    Treatment/Activity Tolerance:  [x] Patient able to complete tx [] Patient limited by fatigue  [x] Patient limited by pain  [] Patient limited by other medical complications  [] Other:     Prognosis: [x] Good [] Fair  [] Poor    Patient Requires Follow-up: [x] Yes  [] No    Plan for next treatment session:post cuff strengthening; postural adjustments    PLAN: See mila PT 2x / week for 6 weeks. [x] Continue per plan of care [] Alter current plan (see comments)  [] Plan of care initiated [] Hold pending MD visit [] Discharge    Electronically signed by: Lilli Rivers, PT, DPT, OMT-C, CSMS    Therapist was present, directed the patient's care, made skilled judgement, and was responsible for assessment and treatment of the patient. Mackenzie Vazquez, SPT     Note: If patient does not return for scheduled/ recommended follow up visits, this note will serve as a discharge from care along with most recent update on progress.

## 2023-01-25 ENCOUNTER — HOSPITAL ENCOUNTER (OUTPATIENT)
Dept: PHYSICAL THERAPY | Age: 68
Setting detail: THERAPIES SERIES
Discharge: HOME | End: 2023-01-25
Payer: COMMERCIAL

## 2023-01-25 PROCEDURE — 97110 THERAPEUTIC EXERCISES: CPT

## 2023-01-25 PROCEDURE — 97112 NEUROMUSCULAR REEDUCATION: CPT

## 2023-01-25 NOTE — FLOWSHEET NOTE
Physical Therapy Daily Treatment Note    Date:  2023     Patient Name:  Pita Delgado    :  1955  MRN: [id number]  Medical Diagnosis:  Impingement syndrome of left shoulder [M75.42]  Acute pain of left shoulder [M25.512]  Treatment Diagnosis: L shoulder pain, decreased LUE strength, decreased LUE AROM, difficulty reaching OH    Insurance/Certification information:  PT Insurance Information: [employer]  Physician Information:  DUANE Enriquez  Plan of care signed (Y/N): []  Yes [x]  No     Date of Patient follow up with Physician:      Progress Report: []  Yes  [x]  No     Date Range for reporting period:  Beginnin2022  POC: 23  Ending:     Progress report due (10 Rx/or 30 days whichever is less): visit #10 or 3/66/15      Recertification due (POC duration/ or 90 days whichever is less): visit #16 or 3/27/23 (8 weeks)     Visit # Insurance Allowable Auth required? Date Range    20- hard max  2 used  []  Yes  [x]  No PCY     Latex Allergy:  [x]NO      []YES  Preferred Language for Healthcare:   []English       [x]other: Chinese-- required; used video  this date    Functional Scale:           Date assessed:  FOTO physical FS primary measure score = 67; risk adjusted = 49  22  FOTO physical FS primary measure score = 51     23    Pain level:  0/10 at rest  Rates 10/10 at night - unable to sleep     SUBJECTIVE:  pain especially in the left shoulder with movement overhead; has creaking in both shoulders. 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     OBJECTIVE:     RESTRICTIONS/PRECAUTIONS: HTN    Exercises/Interventions:     Therapeutic Exercises (63464) Resistance / level Sets/sec Reps Notes   Arm bike  2 min forward/2 min back     Wall slides forward and arcs   X 10 each B    Standing cane exercises: flex, abd, OH press, ER   X 10 each B    Wall slides   SB shld flex in chair   X 15    ER with TB at neutral  Chest pull with TB Lime  lime  2 x 10  2 x 10    Ext with TB partial range  Add with TB partial range   X 10 B  X 10 B                         Cable cross:  Mid row     20#    X 15 Attempted a high row, but patient unable to tolerate          Therapeutic Activities (63595)       Shelf taps       Mod plank on table with push ups   2 x 10                                                     Neuromuscular Re-ed (28120)       SH ISOs Flex/AB 5\" 10    Ball on wall circles   X 10 B at 90 deg    Partial plank in standing at wall with clock reaching   X 10 at 12,3,6, and 9                         Manual Intervention (88947)       LUE distraction       L GHJ mob -posterior, inferior              L UE PROM               ktape       Modalities:     Pt. Education:  12/06/2022  -patient educated on diagnosis, prognosis and expectations for rehab  -all patient questions were answered  -educated on postural awareness with all shoulder mobility in order to reduce pain with LUE movement. Home Exercise Program:    Access Code: [email or web address]  URL: [email or web address]  Date: 01/23/2023  Prepared by: Kami Moreland    Exercises  Supine Shoulder Flexion AAROM - 1 x daily - 7 x weekly - 3 sets - 10 reps  Seated Shoulder Flexion Towel Slide at Table Top - 1 x daily - 7 x weekly - 3 sets - 10 reps  Shoulder Flexion Wall Slide with Towel - 1 x daily - 7 x weekly - 3 sets - 10 reps  Seated Scapular Retraction - 1 x daily - 7 x weekly - 1 sets - 10 reps - 5 sec hold  Isometric Shoulder Abduction at Wall - 1 x daily - 7 x weekly - 3 sets - 10 reps  Isometric Shoulder External Rotation at Wall - 1 x daily - 7 x weekly - 3 sets - 10 reps  Isometric Shoulder Flexion at Wall - 1 x daily - 7 x weekly - 3 sets - 10 reps      Therapeutic Exercise and NMR EXR  [x] (24020) Provided verbal/tactile cueing for activities related to strengthening, flexibility, endurance, ROM for improvements in  [] LE / Lumbar: LE, proximal hip, and core control with self care, mobility, lifting, ambulation. [] UE / Cervical: cervical, postural, scapular, scapulothoracic and UE control with self care, reaching, carrying, lifting, house/yardwork, driving, computer work.  [] (37511) Provided verbal/tactile cueing for activities related to improving balance, coordination, kinesthetic sense, posture, motor skill, proprioception to assist with   [] LE / lumbar: LE, proximal hip, and core control in self care, mobility, lifting, ambulation and eccentric single leg control. [] UE / cervical: cervical, scapular, scapulothoracic and UE control with self care, reaching, carrying, lifting, house/yardwork, driving, computer work.   [] (16677) Therapist is in constant attendance of 2 or more patients providing skilled therapy interventions, but not providing any significant amount of measurable one-on-one time to either patient, for improvements in  [] LE / lumbar: LE, proximal hip, and core control in self care, mobility, lifting, ambulation and eccentric single leg control. [] UE / cervical: cervical, scapular, scapulothoracic and UE control with self care, reaching, carrying, lifting, house/yardwork, driving, computer work.      NMR and Therapeutic Activities:    [x] (43011 or 21723) Provided verbal/tactile cueing for activities related to improving balance, coordination, kinesthetic sense, posture, motor skill, proprioception and motor activation to allow for proper function of   [] LE: / Lumbar core, proximal hip and LE with self care and ADLs  [x] UE / Cervical: cervical, postural, scapular, scapulothoracic and UE control with self care, carrying, lifting, driving, computer work.   [] (32085) Gait Re-education- Provided training and instruction to the patient for proper LE, core and proximal hip recruitment and positioning and eccentric body weight control with ambulation re-education including up and down stairs     Home Management Training / Self Care:  [] (97252) Provided self-care/home management training related to activities of daily living and compensatory training, and/or use of adaptive equipment for improvement with: ADLs and compensatory training, meal preparation, safety procedures and instruction in use of adaptive equipment, including bathing, grooming, dressing, personal hygiene, basic household cleaning and chores. Home Exercise Program:    [x] (81535) Reviewed/Progressed HEP activities related to strengthening, flexibility, endurance, ROM of   [] LE / Lumbar: core, proximal hip and LE for functional self-care, mobility, lifting and ambulation/stair navigation   [x] UE / Cervical: cervical, postural, scapular, scapulothoracic and UE control with self care, reaching, carrying, lifting, house/yardwork, driving, computer work  [] (92528)Reviewed/Progressed HEP activities related to improving balance, coordination, kinesthetic sense, posture, motor skill, proprioception of   [] LE: core, proximal hip and LE for self care, mobility, lifting, and ambulation/stair navigation    [] UE / Cervical: cervical, postural,  scapular, scapulothoracic and UE control with self care, reaching, carrying, lifting, house/yardwork, driving, computer work    Manual Treatments:  PROM / STM / Oscillations-Mobs:  G-I, II, III, IV (PA's, Inf., Post.)  [] (51674) Provided manual therapy to mobilize LE, proximal hip and/or LS spine soft tissue/joints for the purpose of modulating pain, promoting relaxation,  increasing ROM, reducing/eliminating soft tissue swelling/inflammation/restriction, improving soft tissue extensibility and allowing for proper ROM for normal function with   [] LE / lumbar: self care, mobility, lifting and ambulation. [] UE / Cervical: self care, reaching, carrying, lifting, house/yardwork, driving, computer work.      Modalities:  [] (55710) Vasopneumatic compression: Utilized vasopneumatic compression to decrease edema / swelling for the purpose of improving mobility and quad tone / recruitment which will allow for increased overall function including but not limited to self-care, transfers, ambulation, and ascending / descending stairs. Charges:  Timed Code Treatment Minutes: 40   Total Treatment Minutes: 40     [] EVAL - LOW (35446)   [] EVAL - MOD (10644)  [] EVAL - HIGH (06938)  [] RE-EVAL (75616)  [x] YR(67168) x 2    [] Ionto  [x] NMR (21635) x   1    [] Vaso  [] Manual (59609) x       [] Ultrasound  [] TA x         [] Mech Traction (75147)  [] Aquatic Therapy x     [] ES (un) (82412):   [] Home Management Training x  [] ES(attended) (30151)   [] Dry Needling 1-2 muscles (89249):  [] Dry Needling 3+ muscles (447617)  [] Group:      [] Other:     GOALS:   Patient stated goal: \"no pain\"  [] Progressing: [] Met: [] Not Met: [] Adjusted     Therapist goals for Patient:   Short Term Goals: To be achieved in: 2 weeks  1. Independent in HEP and progression per patient tolerance, in order to prevent re-injury. [] Progressing: [] Met: [] Not Met: [] Adjusted  2. Patient will have a decrease in pain to facilitate improvement in movement, function, and ADLs as indicated by Functional Deficits. [] Progressing: [] Met: [] Not Met: [] Adjusted     Long Term Goals: To be achieved in: 6 weeks  1. FOTO score of at least 69 to assist with reaching prior level of function. [] Progressing: [] Met: [] Not Met: [] Adjusted  2. Patient will demonstrate increased L shoulder flexion AROM to 170 deg to allow for proper joint functioning as indicated by patients Functional Deficits. [] Progressing: [] Met: [] Not Met: [] Adjusted  3. Patient will demonstrate an increase in R shoulder Strength to 5 to allow for proper functional mobility as indicated by patients Functional Deficits. [] Progressing: [] Met: [] Not Met: [] Adjusted  4. Patient will return to functional activities including reaching [place] without increased symptoms or restriction.    [] Progressing: [] Met: [] Not Met: [] Adjusted  5. Pt will be able to lift box OH without increased pain and demonstrate improve ergonomics. [] Progressing: [] Met: [] Not Met: [] Adjusted     Overall Progression Towards Functional goals/ Treatment Progress Update:  [] Patient is progressing as expected towards functional goals listed. [] Progression is slowed due to complexities/Impairments listed. [] Progression has been slowed due to co-morbidities. [x] Plan just implemented, too soon to assess goals progression <30days   [] Goals require adjustment due to lack of progress  [] Patient is not progressing as expected and requires additional follow up with physician  [] Other    Persisting Functional Limitations/Impairments:  [x]Sleeping []Sitting               []Standing []Transfers        []Walking []Kneeling               []Stairs []Squatting / bending   [x]ADLs [x]Reaching  [x]Lifting  [x]Housework  [x]Driving [x]Job related tasks  []Sports/Recreation []Other:        ASSESSMENT: ROM limited by pain, worse on the left  Treatment/Activity Tolerance:  [x] Patient able to complete tx [] Patient limited by fatigue  [x] Patient limited by pain  [] Patient limited by other medical complications  [] Other:     Prognosis: [x] Good [] Fair  [] Poor    Patient Requires Follow-up: [x] Yes  [] No    Plan for next treatment session:post cuff strengthening; postural adjustments    PLAN: See keegan. PT 2x / week for 6 weeks. [x] Continue per plan of care [] Alter current plan (see comments)  [] Plan of care initiated [] Hold pending MD visit [] Discharge    Electronically signed by: Sascha Escobar, PT, DPT    Therapist was present, directed the patient's care, made skilled judgement, and was responsible for assessment and treatment of the patient. Rebecca Vinson, SPT     Note: If patient does not return for scheduled/ recommended follow up visits, this note will serve as a discharge from care along with most recent update on progress.

## 2023-01-30 ENCOUNTER — HOSPITAL ENCOUNTER (OUTPATIENT)
Dept: PHYSICAL THERAPY | Age: 68
Setting detail: THERAPIES SERIES
Discharge: HOME | End: 2023-01-30
Payer: COMMERCIAL

## 2023-01-30 ENCOUNTER — OFFICE VISIT (OUTPATIENT)
Dept: PSYCHOLOGY | Age: 68
End: 2023-01-30
Payer: COMMERCIAL

## 2023-01-30 DIAGNOSIS — F33.1 MODERATE EPISODE OF RECURRENT MAJOR DEPRESSIVE DISORDER (HCC): Primary | ICD-10-CM

## 2023-01-30 PROCEDURE — 1123F ACP DISCUSS/DSCN MKR DOCD: CPT | Performed by: PSYCHOLOGIST

## 2023-01-30 PROCEDURE — 97112 NEUROMUSCULAR REEDUCATION: CPT

## 2023-01-30 PROCEDURE — 97110 THERAPEUTIC EXERCISES: CPT

## 2023-01-30 PROCEDURE — 1036F TOBACCO NON-USER: CPT | Performed by: PSYCHOLOGIST

## 2023-01-30 PROCEDURE — 90832 PSYTX W PT 30 MINUTES: CPT | Performed by: PSYCHOLOGIST

## 2023-01-30 NOTE — FLOWSHEET NOTE
Physical Therapy Daily Treatment Note    Date:  2023     Patient Name:  Edmar Esquivel    :  1955  MRN: [id number]  Medical Diagnosis:  Impingement syndrome of left shoulder [M75.42]  Acute pain of left shoulder [M25.512]  Treatment Diagnosis: L shoulder pain, decreased LUE strength, decreased LUE AROM, difficulty reaching OH    Insurance/Certification information:  PT Insurance Information: [employer]  Physician Information:  DUANE Cox  Plan of care signed (Y/N): []  Yes [x]  No     Date of Patient follow up with Physician:      Progress Report: []  Yes  [x]  No     Date Range for reporting period:  Beginnin2022  POC: 23  Ending:     Progress report due (10 Rx/or 30 days whichever is less): visit #10 or       Recertification due (POC duration/ or 90 days whichever is less): visit #16 or 3/27/23 (8 weeks)     Visit # Insurance Allowable Auth required? Date Range   3/12 20- hard max  2 used  []  Yes  [x]  No PCY     Latex Allergy:  [x]NO      []YES  Preferred Language for Healthcare:   []English       [x]other: Maltese-- required; used video  this date    Functional Scale:           Date assessed:  FOTO physical FS primary measure score = 67; risk adjusted = 49  22  FOTO physical FS primary measure score = 51     23    Pain level:  0/10 at rest  Rates 10/10 at night - unable to sleep     SUBJECTIVE:  pain in the left deltoid region that feels deep and is keeping her from sleeping through the night. Hears popping in the same area. Has an MRI to schedule but is waiting for her family member to assist with that due to the language barrier.   OBJECTIVE: : AROM limited from end range and painful overhead L shoulder; able to achieve 135 into flexion today    RESTRICTIONS/PRECAUTIONS: HTN    Exercises/Interventions:     Therapeutic Exercises (24408) Resistance / level Sets/sec Reps Notes   Arm bike  2 min forward/2 min back Wall slides forward and arcs   X 10 each B    Standing cane exercises: flex, abd, OH press, ER   X 10 each B    Wall slides   SB shld flex in chair   X 15    ER with TB at neutral  Chest pull with TB Lime  lime  2 x 10  2 x 10    Ext with TB partial range  Add with TB partial range   X 10 B  X 10 B                         Cable cross:  Mid row     20#    X 15 Attempted a high row, but patient unable to tolerate          Therapeutic Activities (72078)       Shelf taps       Mod plank on table with push ups   2 x 10                                                     Neuromuscular Re-ed (80881)       SH ISOs Flex/AB 5\" 10    Ball on wall circles   X 10 B at 90 deg    Partial plank in standing at wall with clock reaching   X 10 at 12,3,6, and 9                         Manual Intervention (72423)       LUE distraction       L GHJ mob -posterior, inferior              L UE PROM               ktape   x delt inhib; scap retract; patient advised to remove if skin irritation or any symptoms worsening; verbalized understanding       Modalities:     Pt. Education:  12/06/2022  -patient educated on diagnosis, prognosis and expectations for rehab  -all patient questions were answered  -educated on postural awareness with all shoulder mobility in order to reduce pain with LUE movement. Home Exercise Program:    Access Code: [email or web address]  URL: [email or web address]  Date: 01/23/2023  Prepared by: An Montesinos    Exercises  Supine Shoulder Flexion AAROM - 1 x daily - 7 x weekly - 3 sets - 10 reps  Seated Shoulder Flexion Towel Slide at Table Top - 1 x daily - 7 x weekly - 3 sets - 10 reps  Shoulder Flexion Wall Slide with Towel - 1 x daily - 7 x weekly - 3 sets - 10 reps  Seated Scapular Retraction - 1 x daily - 7 x weekly - 1 sets - 10 reps - 5 sec hold  Isometric Shoulder Abduction at Wall - 1 x daily - 7 x weekly - 3 sets - 10 reps  Isometric Shoulder External Rotation at Wall - 1 x daily - 7 x weekly - 3 sets - 10 reps  Isometric Shoulder Flexion at Wall - 1 x daily - 7 x weekly - 3 sets - 10 reps      Therapeutic Exercise and NMR EXR  [x] (74166) Provided verbal/tactile cueing for activities related to strengthening, flexibility, endurance, ROM for improvements in  [] LE / Lumbar: LE, proximal hip, and core control with self care, mobility, lifting, ambulation. [] UE / Cervical: cervical, postural, scapular, scapulothoracic and UE control with self care, reaching, carrying, lifting, house/yardwork, driving, computer work.  [] (21964) Provided verbal/tactile cueing for activities related to improving balance, coordination, kinesthetic sense, posture, motor skill, proprioception to assist with   [] LE / lumbar: LE, proximal hip, and core control in self care, mobility, lifting, ambulation and eccentric single leg control. [] UE / cervical: cervical, scapular, scapulothoracic and UE control with self care, reaching, carrying, lifting, house/yardwork, driving, computer work.   [] (70281) Therapist is in constant attendance of 2 or more patients providing skilled therapy interventions, but not providing any significant amount of measurable one-on-one time to either patient, for improvements in  [] LE / lumbar: LE, proximal hip, and core control in self care, mobility, lifting, ambulation and eccentric single leg control. [] UE / cervical: cervical, scapular, scapulothoracic and UE control with self care, reaching, carrying, lifting, house/yardwork, driving, computer work.      NMR and Therapeutic Activities:    [x] (45846 or 33805) Provided verbal/tactile cueing for activities related to improving balance, coordination, kinesthetic sense, posture, motor skill, proprioception and motor activation to allow for proper function of   [] LE: / Lumbar core, proximal hip and LE with self care and ADLs  [x] UE / Cervical: cervical, postural, scapular, scapulothoracic and UE control with self care, carrying, lifting, driving, computer work.   [] (79322) Gait Re-education- Provided training and instruction to the patient for proper LE, core and proximal hip recruitment and positioning and eccentric body weight control with ambulation re-education including up and down stairs     Home Management Training / Self Care:  [] (66226) Provided self-care/home management training related to activities of daily living and compensatory training, and/or use of adaptive equipment for improvement with: ADLs and compensatory training, meal preparation, safety procedures and instruction in use of adaptive equipment, including bathing, grooming, dressing, personal hygiene, basic household cleaning and chores.      Home Exercise Program:    [x] (58262) Reviewed/Progressed HEP activities related to strengthening, flexibility, endurance, ROM of   [] LE / Lumbar: core, proximal hip and LE for functional self-care, mobility, lifting and ambulation/stair navigation   [x] UE / Cervical: cervical, postural, scapular, scapulothoracic and UE control with self care, reaching, carrying, lifting, house/yardwork, driving, computer work  [] (47907)Reviewed/Progressed HEP activities related to improving balance, coordination, kinesthetic sense, posture, motor skill, proprioception of   [] LE: core, proximal hip and LE for self care, mobility, lifting, and ambulation/stair navigation    [] UE / Cervical: cervical, postural,  scapular, scapulothoracic and UE control with self care, reaching, carrying, lifting, house/yardwork, driving, computer work    Manual Treatments:  PROM / STM / Oscillations-Mobs:  G-I, II, III, IV (PA's, Inf., Post.)  [] (41505) Provided manual therapy to mobilize LE, proximal hip and/or LS spine soft tissue/joints for the purpose of modulating pain, promoting relaxation,  increasing ROM, reducing/eliminating soft tissue swelling/inflammation/restriction, improving soft tissue extensibility and allowing for proper ROM for normal function with   [] LE / lumbar: self care, mobility, lifting and ambulation. [] UE / Cervical: self care, reaching, carrying, lifting, house/yardwork, driving, computer work. Modalities:  [] (81129) Vasopneumatic compression: Utilized vasopneumatic compression to decrease edema / swelling for the purpose of improving mobility and quad tone / recruitment which will allow for increased overall function including but not limited to self-care, transfers, ambulation, and ascending / descending stairs. Charges:  Timed Code Treatment Minutes: 50   Total Treatment Minutes: 50     [] EVAL - LOW (21666)   [] EVAL - MOD (89894)  [] EVAL - HIGH (56589)  [] RE-EVAL (25434)  [x] TX(15003) x 2    [] Ionto  [x] NMR (61608) x   1    [] Vaso  [] Manual (16320) x       [] Ultrasound  [] TA x         [] Mech Traction (88842)  [] Aquatic Therapy x     [] ES (un) (13191):   [] Home Management Training x  [] ES(attended) (43348)   [] Dry Needling 1-2 muscles (25811):  [] Dry Needling 3+ muscles (095010)  [] Group:      [] Other:     GOALS:   Patient stated goal: \"no pain\"  [] Progressing: [] Met: [] Not Met: [] Adjusted     Therapist goals for Patient:   Short Term Goals: To be achieved in: 2 weeks  1. Independent in HEP and progression per patient tolerance, in order to prevent re-injury. [] Progressing: [] Met: [] Not Met: [] Adjusted  2. Patient will have a decrease in pain to facilitate improvement in movement, function, and ADLs as indicated by Functional Deficits. [] Progressing: [] Met: [] Not Met: [] Adjusted     Long Term Goals: To be achieved in: 6 weeks  1. FOTO score of at least 69 to assist with reaching prior level of function. [] Progressing: [] Met: [] Not Met: [] Adjusted  2. Patient will demonstrate increased L shoulder flexion AROM to 170 deg to allow for proper joint functioning as indicated by patients Functional Deficits. [] Progressing: [] Met: [] Not Met: [] Adjusted  3.  Patient will demonstrate an increase in R shoulder Strength to 5 to allow for proper functional mobility as indicated by patients Functional Deficits. [] Progressing: [] Met: [] Not Met: [] Adjusted  4. Patient will return to functional activities including reaching [place] without increased symptoms or restriction. [] Progressing: [] Met: [] Not Met: [] Adjusted  5. Pt will be able to lift box OH without increased pain and demonstrate improve ergonomics. [] Progressing: [] Met: [] Not Met: [] Adjusted     Overall Progression Towards Functional goals/ Treatment Progress Update:  [] Patient is progressing as expected towards functional goals listed. [] Progression is slowed due to complexities/Impairments listed. [] Progression has been slowed due to co-morbidities. [x] Plan just implemented, too soon to assess goals progression <30days   [] Goals require adjustment due to lack of progress  [] Patient is not progressing as expected and requires additional follow up with physician  [] Other    Persisting Functional Limitations/Impairments:  [x]Sleeping []Sitting               []Standing []Transfers        []Walking []Kneeling               []Stairs []Squatting / bending   [x]ADLs [x]Reaching  [x]Lifting  [x]Housework  [x]Driving [x]Job related tasks  []Sports/Recreation []Other:        ASSESSMENT: ROM limited by pain, worse on the left  Treatment/Activity Tolerance:  [x] Patient able to complete tx [] Patient limited by fatigue  [x] Patient limited by pain  [] Patient limited by other medical complications  [] Other:     Prognosis: [x] Good [] Fair  [] Poor    Patient Requires Follow-up: [x] Yes  [] No    Plan for next treatment session:post cuff strengthening; postural adjustments    PLAN: See eval. PT 2x / week for 6 weeks.    [x] Continue per plan of care [] Alter current plan (see comments)  [] Plan of care initiated [] Hold pending MD visit [] Discharge    Electronically signed by: Giovanni Palomo PT, DPT    Therapist was present, directed the patient's care, made skilled judgement, and was responsible for assessment and treatment of the patient. Selena Gibson, SPT     Note: If patient does not return for scheduled/ recommended follow up visits, this note will serve as a discharge from care along with most recent update on progress.

## 2023-01-30 NOTE — PROGRESS NOTES
Behavioral Health Consultation  Elijah Becker M.A. Psychology Assistant  Lee Ruiz, Ph.D. Supervising Psychologist  2023  8:15 AM EST       used     Time spent with Patient: 41 minutes       This is patient's second  [facility] appointment. Reason for Consult:  depression    Feedback given to PCP. S:  Pt seen for f/u of depression. Pt reports improved mood and sxs. Pt noted her mood has been calm, however, she has moments of depression. Her sister  in [facility] 5 years ago; she spent a lot of time in the hospital in a vegetative state. Pt recently thought of her and became upset. Pt noted she frequently thinks about her situation, being alone, and her age. She noted she came to the [address] [place] [address] and it is not easy to survive here. She noted she has to work to survive and this is tiring. She noted her family has been ungrateful and so she detached herself from them. She is reluctant to reconnect with them. She stated it is better this way due to misunderstandings. She feels she was not respected. She stated she does not have a social life and is always alone. She noted she had the same behaviors in her country, but here there is less hospitality. She does not know her neighbors. She identified language as a major barrier. She noted certain neighbors are kind to her, however, they cannot communicate. She has a close friend who helps with bills and is a good source of support. Reviewed cycle of depression and importance of regular enjoyable activities. She wants to apply to become a citizen and noted things would be a lot easier if she was a citizen. She is currently unable to leave to visit [facility]. She cannot do her test to become a citizen in  [facility]. Discussed goals to work toward citizenship. Pt noted her arm is hurting. She is receiving physical therapy, but the pain is disrupting her sleep. Discussed goal to work on pain management at next visit. O:  MSE:    Appearance: good hygiene   Attitude: cooperative and friendly  Consciousness: alert  Orientation: oriented to person, place, time, general circumstance  Memory: recent and remote memory intact  Attention/Concentration: intact during session  Psychomotor Activity:normal  Eye Contact: normal  Speech: normal rate and volume, well-articulated  Mood: depressed  Affect: dysphoric  Perception: within normal limits  Thought Content: within normal limits  Thought Process: logical, coherent  Insight: good  Judgment: intact  Ability to understand instructions: Yes  Ability to respond meaningfully: Yes  Morbid Ideation: no   Suicide Assessment: no suicidal ideation, plan, or intent  Homicidal Ideation: no     History:    Medications:   Current Outpatient Medications   Medication Sig Dispense Refill    DULoxetine (CYMBALTA) 30 MG extended release capsule Take 1 capsule by mouth daily 30 capsule 5    acetaminophen (TYLENOL) 500 MG tablet Take 1,000 mg by mouth every 6 hours as needed for Pain      ibuprofen (ADVIL;MOTRIN) 600 MG tablet Take 1 tablet by mouth every 6 hours as needed for Pain (Patient not taking: Reported on 12/20/2022) 20 tablet 0    aspirin 81 MG chewable tablet 81 mg daily       No current facility-administered medications for this visit.      Social History:   Social History     Socioeconomic History    Marital status:      Spouse name: Not on file    Number of children: Not on file    Years of education: Not on file    Highest education level: Not on file   Occupational History    Not on file   Tobacco Use    Smoking status: Never    Smokeless tobacco: Never   Vaping Use    Vaping Use: Never used   Substance and Sexual Activity    Alcohol use: No    Drug use: No     Frequency: 3.0 times per week    Sexual activity: Not on file   Other Topics Concern    Not on file   Social History Narrative    Not on file     Social Determinants of Health     Financial Resource Strain: Low Risk Difficulty of Paying Living Expenses: Not hard at all   Food Insecurity: No Food Insecurity    Worried About Running Out of Food in the Last Year: Never true    Ran Out of Food in the Last Year: Never true   Transportation Needs: Not on file   Physical Activity: Not on file   Stress: Not on file   Social Connections: Not on file   Intimate Partner Violence: Not on file   Housing Stability: Not on file     TOBACCO:   reports that she has never smoked. She has never used smokeless tobacco.  ETOH:   reports no history of alcohol use. Family History:   Family History   Problem Relation Age of Onset    Diabetes Sister      A:  Re-administered PHQ-9 and CLAUDIA-7 (see below).    Diagnosis:    Major depressive disorder; recurrent and moderate  R/O CLAUDIA        Diagnosis Date    Hypertension      Plan:  Pt interventions:  Discussed various factors related to the development and maintenance of  depression (including biological, cognitive, behavioral, and environmental factors), Established rapport, [place]-setting to identify pt's primary goals for [facility] visit / overall health, and Supportive techniques    Pt Behavioral Change Plan:   See Pt Instructions

## 2023-02-02 ENCOUNTER — APPOINTMENT (OUTPATIENT)
Dept: PHYSICAL THERAPY | Age: 68
End: 2023-02-02
Payer: COMMERCIAL

## 2023-02-02 ENCOUNTER — OFFICE VISIT (OUTPATIENT)
Dept: INTERNAL MEDICINE CLINIC | Age: 68
End: 2023-02-02
Payer: COMMERCIAL

## 2023-02-02 VITALS
WEIGHT: 177 LBS | HEART RATE: 63 BPM | SYSTOLIC BLOOD PRESSURE: 100 MMHG | OXYGEN SATURATION: 97 % | BODY MASS INDEX: 34.75 KG/M2 | DIASTOLIC BLOOD PRESSURE: 60 MMHG | HEIGHT: 60 IN

## 2023-02-02 DIAGNOSIS — M20.62 TOE DEFORMITY, LEFT: ICD-10-CM

## 2023-02-02 DIAGNOSIS — M20.61 TOE DEFORMITY, RIGHT: ICD-10-CM

## 2023-02-02 DIAGNOSIS — M79.671 CHRONIC PAIN OF BOTH FEET: Primary | ICD-10-CM

## 2023-02-02 DIAGNOSIS — G89.29 CHRONIC LEFT SHOULDER PAIN: ICD-10-CM

## 2023-02-02 DIAGNOSIS — G89.29 CHRONIC PAIN OF BOTH FEET: Primary | ICD-10-CM

## 2023-02-02 DIAGNOSIS — M25.512 CHRONIC LEFT SHOULDER PAIN: ICD-10-CM

## 2023-02-02 DIAGNOSIS — M79.672 CHRONIC PAIN OF BOTH FEET: Primary | ICD-10-CM

## 2023-02-02 PROCEDURE — 1123F ACP DISCUSS/DSCN MKR DOCD: CPT | Performed by: NURSE PRACTITIONER

## 2023-02-02 PROCEDURE — 3017F COLORECTAL CA SCREEN DOC REV: CPT | Performed by: NURSE PRACTITIONER

## 2023-02-02 PROCEDURE — G8484 FLU IMMUNIZE NO ADMIN: HCPCS | Performed by: NURSE PRACTITIONER

## 2023-02-02 PROCEDURE — 1090F PRES/ABSN URINE INCON ASSESS: CPT | Performed by: NURSE PRACTITIONER

## 2023-02-02 PROCEDURE — 1036F TOBACCO NON-USER: CPT | Performed by: NURSE PRACTITIONER

## 2023-02-02 PROCEDURE — G8417 CALC BMI ABV UP PARAM F/U: HCPCS | Performed by: NURSE PRACTITIONER

## 2023-02-02 PROCEDURE — G8400 PT W/DXA NO RESULTS DOC: HCPCS | Performed by: NURSE PRACTITIONER

## 2023-02-02 PROCEDURE — G8427 DOCREV CUR MEDS BY ELIG CLIN: HCPCS | Performed by: NURSE PRACTITIONER

## 2023-02-02 PROCEDURE — 99213 OFFICE O/P EST LOW 20 MIN: CPT | Performed by: NURSE PRACTITIONER

## 2023-02-02 RX ORDER — ACETAMINOPHEN 500 MG
1000 TABLET ORAL
Qty: 120 TABLET | Refills: 2 | Status: SHIPPED | OUTPATIENT
Start: 2023-02-02

## 2023-02-02 SDOH — ECONOMIC STABILITY: HOUSING INSECURITY
IN THE LAST 12 MONTHS, WAS THERE A TIME WHEN YOU DID NOT HAVE A STEADY PLACE TO SLEEP OR SLEPT IN A SHELTER (INCLUDING NOW)?: NO

## 2023-02-02 SDOH — ECONOMIC STABILITY: INCOME INSECURITY: HOW HARD IS IT FOR YOU TO PAY FOR THE VERY BASICS LIKE FOOD, HOUSING, MEDICAL CARE, AND HEATING?: SOMEWHAT HARD

## 2023-02-02 SDOH — ECONOMIC STABILITY: FOOD INSECURITY: WITHIN THE PAST 12 MONTHS, YOU WORRIED THAT YOUR FOOD WOULD RUN OUT BEFORE YOU GOT MONEY TO BUY MORE.: NEVER TRUE

## 2023-02-02 SDOH — ECONOMIC STABILITY: FOOD INSECURITY: WITHIN THE PAST 12 MONTHS, THE FOOD YOU BOUGHT JUST DIDN'T LAST AND YOU DIDN'T HAVE MONEY TO GET MORE.: NEVER TRUE

## 2023-02-02 NOTE — PATIENT INSTRUCTIONS
Podiatry    Dr. Diana Kwan  [address] [place] [address], [address] [place] [address]  [phone]

## 2023-02-07 ENCOUNTER — HOSPITAL ENCOUNTER (OUTPATIENT)
Dept: PHYSICAL THERAPY | Age: 68
Setting detail: THERAPIES SERIES
Discharge: HOME | End: 2023-02-07
Payer: COMMERCIAL

## 2023-02-07 PROCEDURE — 97140 MANUAL THERAPY 1/> REGIONS: CPT

## 2023-02-07 PROCEDURE — 97530 THERAPEUTIC ACTIVITIES: CPT

## 2023-02-07 NOTE — FLOWSHEET NOTE
Physical Therapy Daily Treatment Note    Date:  2023     Patient Name:  Yamila Villasenor    :  1955  MRN: [id number]  Medical Diagnosis:  Impingement syndrome of left shoulder [M75.42]  Acute pain of left shoulder [M25.512]  Treatment Diagnosis: L shoulder pain, decreased LUE strength, decreased LUE AROM, difficulty reaching OH    Insurance/Certification information:  PT Insurance Information: [employer]  Physician Information:  DUANE Walker  Plan of care signed (Y/N): []  Yes [x]  No     Date of Patient follow up with Physician:      Progress Report: []  Yes  [x]  No     Date Range for reporting period:  Beginnin2022  POC: 23  Ending:     Progress report due (10 Rx/or 30 days whichever is less): visit #10 or       Recertification due (POC duration/ or 90 days whichever is less): visit #16 or 3/27/23 (8 weeks)     Visit # Insurance Allowable Auth required? Date Range    20- hard max  2 used  []  Yes  [x]  No PCY     Latex Allergy:  [x]NO      []YES  Preferred Language for Healthcare:   []English       [x]other: Wolof-- required; used video  this date    Functional Scale:           Date assessed:  FOTO physical FS primary measure score = 67; risk adjusted = 49  22  FOTO physical FS primary measure score = 51     23    Pain level:  7/10     SUBJECTIVE:  reporting that she had a great deal of pain in her left shoulder and into the back of her head and half way down her arm over the weekend. Continuing to have trouble with generalized shoulder pain when laying down to sleep at night and when attempting to lift her arm into elevation. Says that the pain feels like it is in her bone. She feels the pain is worsening and is causing her arm to be weak. Denies n/t and denies signs and symptoms of systemic disease. MRI is scheduled for Thursday. She will be back to PT on Friday. Held on exercises today due to pain.   OBJECTIVE: : AROM left shoulder: flex to 74 and abd to 75, both very guarded and slow and needing support on return; ER and IR WFL; able to achieve 130 deg into flex and abd passively. Strength limited by pain into flex and abd. ER/IR at 3+/5 and biceps at 4/5. Shoulder ext also at 4/5.    1/30: AROM limited from end range and painful overhead L shoulder; able to achieve 135 into flexion today    RESTRICTIONS/PRECAUTIONS: HTN    Exercises/Interventions:     Therapeutic Exercises (74400) Resistance / level Sets/sec Reps Notes   Arm bike     Wall slides forward and arcs     Standing cane exercises: flex, abd, OH press, ER     Wall slides   SB shld flex in chair   X 15    ER with TB at neutral  Chest pull with TB    Ext with TB partial range  Add with TB partial range                         Cable cross:  Mid row          Therapeutic Activities (73749)       Shelf taps       Mod plank on table with push ups                    Re-measures and counseling of patient in the use of ice, very gentle shoulder rolls and scap retraction, and can use warm water for pain management   x                                Neuromuscular Re-ed (87648)       SH ISOs    Ball on wall circles    Partial plank in standing at wall with clock reaching                         Manual Intervention (99449)       LUE distraction       L GHJ mob -posterior, inferior              L UE PROM        Gentle STM left shoulder upper trap/levator/deltoid   x    ktape   x delt inhib; scap retract; patient advised to remove if skin irritation or any symptoms worsening; verbalized understanding       Modalities:     Pt. Education:  12/06/2022  -patient educated on diagnosis, prognosis and expectations for rehab  -all patient questions were answered  -educated on postural awareness with all shoulder mobility in order to reduce pain with LUE movement. Home Exercise Program:    Access Code: [email or web address]  URL: [email or web address]  Date: 01/23/2023  Prepared by: Mary Azar Ross    Exercises  Supine Shoulder Flexion AAROM - 1 x daily - 7 x weekly - 3 sets - 10 reps  Seated Shoulder Flexion Towel Slide at Table Top - 1 x daily - 7 x weekly - 3 sets - 10 reps  Shoulder Flexion Wall Slide with Towel - 1 x daily - 7 x weekly - 3 sets - 10 reps  Seated Scapular Retraction - 1 x daily - 7 x weekly - 1 sets - 10 reps - 5 sec hold  Isometric Shoulder Abduction at Wall - 1 x daily - 7 x weekly - 3 sets - 10 reps  Isometric Shoulder External Rotation at Wall - 1 x daily - 7 x weekly - 3 sets - 10 reps  Isometric Shoulder Flexion at Wall - 1 x daily - 7 x weekly - 3 sets - 10 reps      Therapeutic Exercise and NMR EXR  [x] (62312) Provided verbal/tactile cueing for activities related to strengthening, flexibility, endurance, ROM for improvements in  [] LE / Lumbar: LE, proximal hip, and core control with self care, mobility, lifting, ambulation. [] UE / Cervical: cervical, postural, scapular, scapulothoracic and UE control with self care, reaching, carrying, lifting, house/yardwork, driving, computer work.  [] (33194) Provided verbal/tactile cueing for activities related to improving balance, coordination, kinesthetic sense, posture, motor skill, proprioception to assist with   [] LE / lumbar: LE, proximal hip, and core control in self care, mobility, lifting, ambulation and eccentric single leg control. [] UE / cervical: cervical, scapular, scapulothoracic and UE control with self care, reaching, carrying, lifting, house/yardwork, driving, computer work.   [] (19288) Therapist is in constant attendance of 2 or more patients providing skilled therapy interventions, but not providing any significant amount of measurable one-on-one time to either patient, for improvements in  [] LE / lumbar: LE, proximal hip, and core control in self care, mobility, lifting, ambulation and eccentric single leg control.    [] UE / cervical: cervical, scapular, scapulothoracic and UE control with self care, reaching, carrying, lifting, house/yardwork, driving, computer work. NMR and Therapeutic Activities:    [x] (74510 or 74691) Provided verbal/tactile cueing for activities related to improving balance, coordination, kinesthetic sense, posture, motor skill, proprioception and motor activation to allow for proper function of   [] LE: / Lumbar core, proximal hip and LE with self care and ADLs  [x] UE / Cervical: cervical, postural, scapular, scapulothoracic and UE control with self care, carrying, lifting, driving, computer work.   [] (01960) Gait Re-education- Provided training and instruction to the patient for proper LE, core and proximal hip recruitment and positioning and eccentric body weight control with ambulation re-education including up and down stairs     Home Management Training / Self Care:  [] (79381) Provided self-care/home management training related to activities of daily living and compensatory training, and/or use of adaptive equipment for improvement with: ADLs and compensatory training, meal preparation, safety procedures and instruction in use of adaptive equipment, including bathing, grooming, dressing, personal hygiene, basic household cleaning and chores.      Home Exercise Program:    [x] (85516) Reviewed/Progressed HEP activities related to strengthening, flexibility, endurance, ROM of   [] LE / Lumbar: core, proximal hip and LE for functional self-care, mobility, lifting and ambulation/stair navigation   [x] UE / Cervical: cervical, postural, scapular, scapulothoracic and UE control with self care, reaching, carrying, lifting, house/yardwork, driving, computer work  [] (95911)Reviewed/Progressed HEP activities related to improving balance, coordination, kinesthetic sense, posture, motor skill, proprioception of   [] LE: core, proximal hip and LE for self care, mobility, lifting, and ambulation/stair navigation    [] UE / Cervical: cervical, postural,  scapular, scapulothoracic and UE control with self care, reaching, carrying, lifting, house/yardwork, driving, computer work    Manual Treatments:  PROM / STM / Oscillations-Mobs:  G-I, II, III, IV (PA's, Inf., Post.)  [x] (48147) Provided manual therapy to mobilize LE, proximal hip and/or LS spine soft tissue/joints for the purpose of modulating pain, promoting relaxation,  increasing ROM, reducing/eliminating soft tissue swelling/inflammation/restriction, improving soft tissue extensibility and allowing for proper ROM for normal function with   [] LE / lumbar: self care, mobility, lifting and ambulation. [x] UE / Cervical: self care, reaching, carrying, lifting, house/yardwork, driving, computer work. Modalities:  [] (30982) Vasopneumatic compression: Utilized vasopneumatic compression to decrease edema / swelling for the purpose of improving mobility and quad tone / recruitment which will allow for increased overall function including but not limited to self-care, transfers, ambulation, and ascending / descending stairs. Charges:  Timed Code Treatment Minutes: 45   Total Treatment Minutes: 45     [] EVAL - LOW (38834)   [] EVAL - MOD (84449)  [] EVAL - HIGH (27288)  [] RE-EVAL (29816)  [] BH(07796) x     [] Ionto  [] NMR (35951) x       [] Vaso  [x] Manual (11408) x  1     [] Ultrasound  [x] TA x   2      [] Mech Traction (77522)  [] Aquatic Therapy x     [] ES (un) (54659):   [] Home Management Training x  [] ES(attended) (95640)   [] Dry Needling 1-2 muscles (58256):  [] Dry Needling 3+ muscles (390520)  [] Group:      [] Other:     GOALS:   Patient stated goal: \"no pain\"  [] Progressing: [] Met: [] Not Met: [] Adjusted     Therapist goals for Patient:   Short Term Goals: To be achieved in: 2 weeks  1. Independent in HEP and progression per patient tolerance, in order to prevent re-injury. [] Progressing: [] Met: [] Not Met: [] Adjusted  2.  Patient will have a decrease in pain to facilitate improvement in movement, function, and ADLs as indicated by Functional Deficits. [] Progressing: [] Met: [] Not Met: [] Adjusted     Long Term Goals: To be achieved in: 6 weeks  1. FOTO score of at least 69 to assist with reaching prior level of function. [] Progressing: [] Met: [] Not Met: [] Adjusted  2. Patient will demonstrate increased L shoulder flexion AROM to 170 deg to allow for proper joint functioning as indicated by patients Functional Deficits. [] Progressing: [] Met: [] Not Met: [] Adjusted  3. Patient will demonstrate an increase in R shoulder Strength to 5 to allow for proper functional mobility as indicated by patients Functional Deficits. [] Progressing: [] Met: [] Not Met: [] Adjusted  4. Patient will return to functional activities including reaching [place] without increased symptoms or restriction. [] Progressing: [] Met: [] Not Met: [] Adjusted  5. Pt will be able to lift box OH without increased pain and demonstrate improve ergonomics. [] Progressing: [] Met: [] Not Met: [] Adjusted     Overall Progression Towards Functional goals/ Treatment Progress Update:  [] Patient is progressing as expected towards functional goals listed. [] Progression is slowed due to complexities/Impairments listed. [x] Progression has been slowed due to co-morbidities. [] Plan just implemented, too soon to assess goals progression <30days   [] Goals require adjustment due to lack of progress  [] Patient is not progressing as expected and requires additional follow up with physician  [] Other    Persisting Functional Limitations/Impairments:  [x]Sleeping []Sitting               []Standing []Transfers        []Walking []Kneeling               []Stairs []Squatting / bending   [x]ADLs [x]Reaching  [x]Lifting  [x]Housework  [x]Driving [x]Job related tasks  []Sports/Recreation []Other:        ASSESSMENT: pain seems to be worsening and patient is reporting that she is starting to feel \"low. \" MRI is Thursday and will hopefully ease fear and assist with treatment plan  Treatment/Activity Tolerance:  [x] Patient able to complete tx [] Patient limited by fatigue  [x] Patient limited by pain  [] Patient limited by other medical complications  [] Other:     Prognosis: [x] Good [] Fair  [] Poor    Patient Requires Follow-up: [x] Yes  [] No    Plan for next treatment session:post cuff strengthening; postural adjustments    PLAN: See eval. PT 2x / week for 6 weeks. [x] Continue per plan of care [] Alter current plan (see comments)  [] Plan of care initiated [] Hold pending MD visit [] Discharge    Electronically signed by: Dian Russell, PT, DPT    Therapist was present, directed the patient's care, made skilled judgement, and was responsible for assessment and treatment of the patient. Wanda Angel, SPT     Note: If patient does not return for scheduled/ recommended follow up visits, this note will serve as a discharge from care along with most recent update on progress.

## 2023-02-09 ENCOUNTER — OFFICE VISIT (OUTPATIENT)
Dept: ORTHOPEDIC SURGERY | Age: 68
End: 2023-02-09
Payer: COMMERCIAL

## 2023-02-09 VITALS — RESPIRATION RATE: 16 BRPM | BODY MASS INDEX: 34.44 KG/M2 | HEIGHT: 60 IN | WEIGHT: 175.4 LBS

## 2023-02-09 DIAGNOSIS — M79.601 BILATERAL ARM PAIN: ICD-10-CM

## 2023-02-09 DIAGNOSIS — M79.602 BILATERAL ARM PAIN: ICD-10-CM

## 2023-02-09 DIAGNOSIS — M54.12 CERVICAL RADICULOPATHY: Primary | ICD-10-CM

## 2023-02-09 PROCEDURE — 1090F PRES/ABSN URINE INCON ASSESS: CPT | Performed by: STUDENT IN AN ORGANIZED HEALTH CARE EDUCATION/TRAINING PROGRAM

## 2023-02-09 PROCEDURE — G8400 PT W/DXA NO RESULTS DOC: HCPCS | Performed by: STUDENT IN AN ORGANIZED HEALTH CARE EDUCATION/TRAINING PROGRAM

## 2023-02-09 PROCEDURE — 1123F ACP DISCUSS/DSCN MKR DOCD: CPT | Performed by: STUDENT IN AN ORGANIZED HEALTH CARE EDUCATION/TRAINING PROGRAM

## 2023-02-09 PROCEDURE — G8427 DOCREV CUR MEDS BY ELIG CLIN: HCPCS | Performed by: STUDENT IN AN ORGANIZED HEALTH CARE EDUCATION/TRAINING PROGRAM

## 2023-02-09 PROCEDURE — G8417 CALC BMI ABV UP PARAM F/U: HCPCS | Performed by: STUDENT IN AN ORGANIZED HEALTH CARE EDUCATION/TRAINING PROGRAM

## 2023-02-09 PROCEDURE — 3017F COLORECTAL CA SCREEN DOC REV: CPT | Performed by: STUDENT IN AN ORGANIZED HEALTH CARE EDUCATION/TRAINING PROGRAM

## 2023-02-09 PROCEDURE — 99203 OFFICE O/P NEW LOW 30 MIN: CPT | Performed by: STUDENT IN AN ORGANIZED HEALTH CARE EDUCATION/TRAINING PROGRAM

## 2023-02-09 PROCEDURE — G8484 FLU IMMUNIZE NO ADMIN: HCPCS | Performed by: STUDENT IN AN ORGANIZED HEALTH CARE EDUCATION/TRAINING PROGRAM

## 2023-02-09 PROCEDURE — 1036F TOBACCO NON-USER: CPT | Performed by: STUDENT IN AN ORGANIZED HEALTH CARE EDUCATION/TRAINING PROGRAM

## 2023-02-09 NOTE — PROGRESS NOTES
New Patient: CERVICAL SPINE    Referring Provider:  DUANE Pettit*    CHIEF COMPLAINT:    Chief Complaint   Patient presents with    Arm Pain     BILATERAL       HISTORY OF PRESENT ILLNESS:    Ms. Sherif Bailey is a pleasant 78 y.o. old female here for consultation regarding her neck and left arm pain. She states the pain began without injury about 4 months ago. Her pain has steadily worsened since then. She rates her neck pain 7/10 and shoulder and arm pain 7/10. She describes the pain as aching, constant. Pain is worse with movement, lifting, overhead motion and slightly better with nothing. She has tried oral steroids and physical therapy with no relief. The arm pain radiates to her triceps along C6. She denies numbness and tingling in a distribution but reports cramping in her hand, worse when she is lying flat on her back. She reports weakness of her left arm. Patient reports some difficulty with fine motor skills like using her phone. She denies lower extremity symptoms, gait abnormality and bowel or bladder dysfunction. The pain interferes with her sleep. She has been to 4 visits of PT so far with little relief. She presents with a  on the iPad and it is a little difficult to obtain a detailed history due to the language barrier.      Current/Past Treatment:   Physical Therapy: 4 visits so far   Chiropractic:  none   Injection:  none   Medications:    NSAIDS:  ibuprofen 600 mg   Muscle relaxer:  NONE  Steriods:  MDP x2 with no relief   Neuropathic medications:  NONE  Opioids: NONE  Other: NONE   Surgery/Consult NONE    Past Medical History:   Past Medical History:   Diagnosis Date    Hypertension       Past Surgical History:     Past Surgical History:   Procedure Laterality Date    BUNIONECTOMY      COLONOSCOPY N/A 9/6/2019    COLONOSCOPY performed by Wanda Gibson MD at [facility]  4/2/12    Dilatation and curetage, hysteroscopy, endometrial biopsy    DILATION AND CURETTAGE OF UTERUS  03/09/2017    HYSTEROSCOPY DILATATION AND CURETTAGE MYOSURE    HYSTEROSCOPY      HYSTEROSCOPY N/A 08/16/2018    HYSTEROSCOPY DILATATION AND CURETTAGE MYOSURE    UPPER GASTROINTESTINAL ENDOSCOPY N/A 3/1/2021    EGD BIOPSY performed by Ca Garrett MD at [address] [facility] ENDOSCOPY     Current Medications:     Current Outpatient Medications:     acetaminophen (TYLENOL) 500 MG tablet, Take 2 tablets by mouth every 6-8 hours as needed for Pain (max 6 tabs in 24 hour period), Disp: 120 tablet, Rfl: 2    DULoxetine (CYMBALTA) 30 MG extended release capsule, Take 1 capsule by mouth daily, Disp: 30 capsule, Rfl: 5    ibuprofen (ADVIL;MOTRIN) 600 MG tablet, Take 1 tablet by mouth every 6 hours as needed for Pain, Disp: 20 tablet, Rfl: 0    aspirin 81 MG chewable tablet, 81 mg daily, Disp: , Rfl:   Allergies:  Patient has no known allergies. Social History:    reports that she has never smoked. She has never used smokeless tobacco. She reports that she does not drink alcohol and does not use drugs. Family History:   Family History   Problem Relation Age of Onset    Diabetes Sister        REVIEW OF SYSTEMS: Full ROS noted & scanned   CONSTITUTIONAL: Denies unexplained weight loss, fevers, chills or fatigue  NEUROLOGICAL: Denies unsteady gait or progressive weakness  MUSCULOSKELETAL: Denies joint swelling or redness  PSYCHOLOGICAL: Denies anxiety, depression   SKIN: Denies skin changes, delayed healing, rash, itching   HEMATOLOGIC: Denies easy bleeding or bruising  ENDOCRINE: Denies excessive thirst, urination, heat/cold  RESPIRATORY: Denies current dyspnea, cough  GI: Denies nausea, vomiting, diarrhea   : Denies bowel or bladder issues      PHYSICAL EXAM:    Vitals: Resp. rate 16, height 5' (1.524 m), weight 175 lb 6.4 oz (79.6 kg), not currently breastfeeding. GENERAL EXAM:  General Apparence: Patient is adequately groomed with no evidence of malnutrition.   Orientation: The patient is oriented to time, place and person. Mood & Affect:The patient's mood and affect are appropriate. Vascular: Examination reveals no swelling tenderness in upper or lower extremities. Good capillary refill. Lymphatic: The lymphatic examination bilaterally reveals all areas to be without enlargement or induration  Sensation: Sensation is intact without deficit  Coordination/Balance: Good coordination     CERVICAL EXAMINATION:  Inspection: Local inspection shows no step-off or bruising. Cervical alignment is normal.     Palpation: No evidence of tenderness at the midline, and trapezius. Paraspinal tenderness is not present. There is no step-off or paraspinal spasm. Range of Motion: Range of Motion:  limited by 25% in all planes due to pain   Strength: 5/5 bilateral upper extremities   Special Tests:      Spurling's +left, L'Hermitte's & Mata's negative bilaterally. Horowitz and Impingement tests are negative bilaterally. Cubital and Carpal tunnel Tinel's negative bilaterally. Skin:There are no rashes, ulcerations or lesions in right & left upper extremities. Reflexes: Bilaterally triceps, biceps and brachioradialis are 2+. Clonus absent bilaterally at the feet. Additional Examinations:       RIGHT UPPER EXTREMITY:  Inspection/examination of the right upper extremity does not show any tenderness, deformity or injury. Range of motion is full. There is no gross instability. There are no rashes, ulcerations or lesions. Strength and tone are normal.  LEFT UPPER EXTREMITY: Inspection/examination of the left upper extremity does not show any tenderness, deformity or injury. Range of motion is full. There is no gross instability. There are no rashes, ulcerations or lesions. Strength and tone are normal.      Diagnostic Testing:    Reviewed AP and lateral cervical spine films taken today in the office: No acute abnormality or fracture noted.   Normal height and configuration of vertebral bodies. Straightening of the normal lordotic curve. Degenerative disc disease at C3-7, moderate. Anterior spurring. Impression:    Diagnosis Orders   1. Cervical radiculopathy  MRI CERVICAL SPINE WO CONTRAST      2. Bilateral arm pain  XR CERVICAL SPINE (2-3 VIEWS)            Plan:    Above diagnoses are Worsening    1. Medications:  I discussed adding gabapentin and/or flexeril to her regimen but she would prefer to wait on the MRI results. 2. PT:   Continue with physical therapy. 3. Further studies:  Setup for Cervical MR without contrast to evaluate for soft tissue pathology or stenosis contributing to the neck pain and paresthesia. The patient has failed a six week trial of a HEP program within the last 6 months. 4. Interventional:  After further imaging is obtained, interventional options will be reviewed and recommended.           Yun Ritter PA-C  Board Certified by the [employer] on Certification of [employer] and [employer]

## 2023-02-10 ENCOUNTER — HOSPITAL ENCOUNTER (OUTPATIENT)
Dept: PHYSICAL THERAPY | Age: 68
Setting detail: THERAPIES SERIES
Discharge: HOME | End: 2023-02-10
Payer: COMMERCIAL

## 2023-02-10 PROCEDURE — 97530 THERAPEUTIC ACTIVITIES: CPT

## 2023-02-10 PROCEDURE — 97110 THERAPEUTIC EXERCISES: CPT

## 2023-02-10 PROCEDURE — 97140 MANUAL THERAPY 1/> REGIONS: CPT

## 2023-02-10 ASSESSMENT — ENCOUNTER SYMPTOMS
RESPIRATORY NEGATIVE: 1
GASTROINTESTINAL NEGATIVE: 1

## 2023-02-10 NOTE — FLOWSHEET NOTE
Physical Therapy Daily Treatment Note    Date:  02/10/2023     Patient Name:  Nelson Whittaker    :  1955  MRN: [id number]  Medical Diagnosis:  Impingement syndrome of left shoulder [M75.42]  Acute pain of left shoulder [M25.512]  Treatment Diagnosis: L shoulder pain, decreased LUE strength, decreased LUE AROM, difficulty reaching OH    Insurance/Certification information:  PT Insurance Information: [employer]  Physician Information:  DUANE Arambula  Plan of care signed (Y/N): []  Yes [x]  No     Date of Patient follow up with Physician:      Progress Report: []  Yes  [x]  No     Date Range for reporting period:  Beginnin2022  POC: 23  Ending:     Progress report due (10 Rx/or 30 days whichever is less): visit #10 or 54      Recertification due (POC duration/ or 90 days whichever is less): visit #16 or 3/27/23 (8 weeks)     Visit # Insurance Allowable Auth required? Date Range    20- hard max  2 used  []  Yes  [x]  No PCY     Latex Allergy:  [x]NO      []YES  Preferred Language for Healthcare:   []English       [x]other: British-- required; used video  this date    Functional Scale:           Date assessed:  FOTO physical FS primary measure score = 67; risk adjusted = 49  22  FOTO physical FS primary measure score = 51     23    Pain level:  4/10     SUBJECTIVE:  saw her doctor and had an Xray of her neck that was negative for fracture. MD has ordered an MRI that the patient may have if she does not have to go all the way in the machine. She is fearful of the MRI. Pain is better managed today. Patient reporting numbness in her left hand today and feels that her pain starts in her neck.   OBJECTIVE: 2/10: shoulder musculature in the deltoid particularly is rather \"lumpy\" with tolerable pain to palpation  : AROM left shoulder: flex to 74 and abd to 75, both very guarded and slow and needing support on return; ER and IR WFL; able to achieve 130 deg into flex and abd passively. Strength limited by pain into flex and abd. ER/IR at 3+/5 and biceps at 4/5. Shoulder ext also at 4/5.    1/30: AROM limited from end range and painful overhead L shoulder; able to achieve 135 into flexion today    RESTRICTIONS/PRECAUTIONS: HTN    Exercises/Interventions:     Therapeutic Exercises (79295) Resistance / level Sets/sec Reps Notes   Arm bike     Wall slides forward and arcs  X 10 each B    Standing cane exercises: flex, abd, OH press, ER  X 10 each B    Wall slides   3\" 10 Cues to avoid early SH shrug   SB shld flex in chair      ER with TB at neutral  Chest pull with TB    Ext with TB partial range  Add with TB partial range                         Cable cross:  Mid row          Therapeutic Activities (09514)       Shelf taps       Mod plank on table with push ups                    Re-measures and counseling of patient in the use of ice, very gentle shoulder rolls and scap retraction, and can use warm water for pain management   x                                Neuromuscular Re-ed (40638)       SH ISOs    Ball on wall circles    Partial plank in standing at wall with clock reaching                         Manual Intervention (39308)       LUE distraction   x    L GHJ mob -posterior, inferior   x           L UE PROM        Gentle STM left shoulder upper trap/levator/deltoid/cervical paraspinals   x    ktape   x delt inhib; scap retract; patient advised to remove if skin irritation or any symptoms worsening; verbalized understanding       Modalities:     Pt. Education:  12/06/2022  -patient educated on diagnosis, prognosis and expectations for rehab  -all patient questions were answered  -educated on postural awareness with all shoulder mobility in order to reduce pain with LUE movement. Home Exercise Program:    Access Code: [email or web address]  URL: [email or web address]  Date: 01/23/2023  Prepared by: Bree Wong    Exercises  Supine Shoulder Flexion GINO - 1 x daily - 7 x weekly - 3 sets - 10 reps  Seated Shoulder Flexion Towel Slide at Table Top - 1 x daily - 7 x weekly - 3 sets - 10 reps  Shoulder Flexion Wall Slide with Towel - 1 x daily - 7 x weekly - 3 sets - 10 reps  Seated Scapular Retraction - 1 x daily - 7 x weekly - 1 sets - 10 reps - 5 sec hold  Isometric Shoulder Abduction at Wall - 1 x daily - 7 x weekly - 3 sets - 10 reps  Isometric Shoulder External Rotation at Wall - 1 x daily - 7 x weekly - 3 sets - 10 reps  Isometric Shoulder Flexion at Wall - 1 x daily - 7 x weekly - 3 sets - 10 reps      Therapeutic Exercise and NMR EXR  [x] (65800) Provided verbal/tactile cueing for activities related to strengthening, flexibility, endurance, ROM for improvements in  [] LE / Lumbar: LE, proximal hip, and core control with self care, mobility, lifting, ambulation. [] UE / Cervical: cervical, postural, scapular, scapulothoracic and UE control with self care, reaching, carrying, lifting, house/yardwork, driving, computer work.  [] (72124) Provided verbal/tactile cueing for activities related to improving balance, coordination, kinesthetic sense, posture, motor skill, proprioception to assist with   [] LE / lumbar: LE, proximal hip, and core control in self care, mobility, lifting, ambulation and eccentric single leg control. [] UE / cervical: cervical, scapular, scapulothoracic and UE control with self care, reaching, carrying, lifting, house/yardwork, driving, computer work.   [] (34703) Therapist is in constant attendance of 2 or more patients providing skilled therapy interventions, but not providing any significant amount of measurable one-on-one time to either patient, for improvements in  [] LE / lumbar: LE, proximal hip, and core control in self care, mobility, lifting, ambulation and eccentric single leg control.    [] UE / cervical: cervical, scapular, scapulothoracic and UE control with self care, reaching, carrying, lifting, house/yardwork, driving, computer work. NMR and Therapeutic Activities:    [x] (03392 or 90299) Provided verbal/tactile cueing for activities related to improving balance, coordination, kinesthetic sense, posture, motor skill, proprioception and motor activation to allow for proper function of   [] LE: / Lumbar core, proximal hip and LE with self care and ADLs  [x] UE / Cervical: cervical, postural, scapular, scapulothoracic and UE control with self care, carrying, lifting, driving, computer work.   [] (99561) Gait Re-education- Provided training and instruction to the patient for proper LE, core and proximal hip recruitment and positioning and eccentric body weight control with ambulation re-education including up and down stairs     Home Management Training / Self Care:  [] (93355) Provided self-care/home management training related to activities of daily living and compensatory training, and/or use of adaptive equipment for improvement with: ADLs and compensatory training, meal preparation, safety procedures and instruction in use of adaptive equipment, including bathing, grooming, dressing, personal hygiene, basic household cleaning and chores.      Home Exercise Program:    [x] (15695) Reviewed/Progressed HEP activities related to strengthening, flexibility, endurance, ROM of   [] LE / Lumbar: core, proximal hip and LE for functional self-care, mobility, lifting and ambulation/stair navigation   [x] UE / Cervical: cervical, postural, scapular, scapulothoracic and UE control with self care, reaching, carrying, lifting, house/yardwork, driving, computer work  [] (39093)Reviewed/Progressed HEP activities related to improving balance, coordination, kinesthetic sense, posture, motor skill, proprioception of   [] LE: core, proximal hip and LE for self care, mobility, lifting, and ambulation/stair navigation    [] UE / Cervical: cervical, postural,  scapular, scapulothoracic and UE control with self care, reaching, carrying, lifting, house/yardwork, driving, computer work    Manual Treatments:  PROM / STM / Oscillations-Mobs:  G-I, II, III, IV (PA's, Inf., Post.)  [x] (35682) Provided manual therapy to mobilize LE, proximal hip and/or LS spine soft tissue/joints for the purpose of modulating pain, promoting relaxation,  increasing ROM, reducing/eliminating soft tissue swelling/inflammation/restriction, improving soft tissue extensibility and allowing for proper ROM for normal function with   [] LE / lumbar: self care, mobility, lifting and ambulation. [x] UE / Cervical: self care, reaching, carrying, lifting, house/yardwork, driving, computer work. Modalities:  [] (68593) Vasopneumatic compression: Utilized vasopneumatic compression to decrease edema / swelling for the purpose of improving mobility and quad tone / recruitment which will allow for increased overall function including but not limited to self-care, transfers, ambulation, and ascending / descending stairs. Charges:  Timed Code Treatment Minutes: 48   Total Treatment Minutes: 48     [] EVAL - LOW (50686)   [] EVAL - MOD (08508)  [] EVAL - HIGH (97695)  [] RE-EVAL (25471)  [x] JW(46631) x 1    [] Ionto  [] NMR (62198) x       [] Vaso  [x] Manual (36509) x  1     [] Ultrasound  [x] TA x   1      [] Mech Traction (16808)  [] Aquatic Therapy x     [] ES (un) (77990):   [] Home Management Training x  [] ES(attended) (67975)   [] Dry Needling 1-2 muscles (52020):  [] Dry Needling 3+ muscles (802628)  [] Group:      [] Other:     GOALS:   Patient stated goal: \"no pain\"  [] Progressing: [] Met: [] Not Met: [] Adjusted     Therapist goals for Patient:   Short Term Goals: To be achieved in: 2 weeks  1. Independent in HEP and progression per patient tolerance, in order to prevent re-injury. [] Progressing: [] Met: [] Not Met: [] Adjusted  2. Patient will have a decrease in pain to facilitate improvement in movement, function, and ADLs as indicated by Functional Deficits.   [] Progressing: [] Met: [] Not Met: [] Adjusted     Long Term Goals: To be achieved in: 6 weeks  1. FOTO score of at least 69 to assist with reaching prior level of function. [] Progressing: [] Met: [] Not Met: [] Adjusted  2. Patient will demonstrate increased L shoulder flexion AROM to 170 deg to allow for proper joint functioning as indicated by patients Functional Deficits. [] Progressing: [] Met: [] Not Met: [] Adjusted  3. Patient will demonstrate an increase in R shoulder Strength to 5 to allow for proper functional mobility as indicated by patients Functional Deficits. [] Progressing: [] Met: [] Not Met: [] Adjusted  4. Patient will return to functional activities including reaching [place] without increased symptoms or restriction. [] Progressing: [] Met: [] Not Met: [] Adjusted  5. Pt will be able to lift box OH without increased pain and demonstrate improve ergonomics. [] Progressing: [] Met: [] Not Met: [] Adjusted     Overall Progression Towards Functional goals/ Treatment Progress Update:  [] Patient is progressing as expected towards functional goals listed. [] Progression is slowed due to complexities/Impairments listed. [x] Progression has been slowed due to co-morbidities. [] Plan just implemented, too soon to assess goals progression <30days   [] Goals require adjustment due to lack of progress  [] Patient is not progressing as expected and requires additional follow up with physician  [] Other    Persisting Functional Limitations/Impairments:  [x]Sleeping []Sitting               []Standing []Transfers        []Walking []Kneeling               []Stairs []Squatting / bending   [x]ADLs [x]Reaching  [x]Lifting  [x]Housework  [x]Driving [x]Job related tasks  []Sports/Recreation []Other:        ASSESSMENT: patient wants to continue PT but will do so once a week due to financial concerns. Awaiting whether patient will have MRI.   Treatment/Activity Tolerance:  [x] Patient able to complete tx [] Patient limited by fatigue  [x] Patient limited by pain  [] Patient limited by other medical complications  [] Other:     Prognosis: [x] Good [] Fair  [] Poor    Patient Requires Follow-up: [x] Yes  [] No    Plan for next treatment session:post cuff strengthening; postural adjustments    PLAN: See keegan. PT 2x / week for 6 weeks. [x] Continue per plan of care [] Alter current plan (see comments)  [] Plan of care initiated [] Hold pending MD visit [] Discharge    Electronically signed by: Katherine Officer, PT, DPT        Note: If patient does not return for scheduled/ recommended follow up visits, this note will serve as a discharge from care along with most recent update on progress.

## 2023-02-10 NOTE — PROGRESS NOTES
SUBJECTIVE:    Patient ID: Yesica Roberson is a 78 y.o. female. CC: Foot pain, shoulder pain, depression    HPI: The patient presents to the office today for short follow-up of chronic medical problems as well as new concerns. Patient was seen in December for follow-up of a new patient visit. At that time, we discussed depression. Patient had been reporting a number of musculoskeletal aches and pains. We discussed treatment options and decided to start her on Cymbalta with hopes that this would help both mood as well as musculoskeletal pains. She has been taking the medication as directed. She feels it has been helpful to her mood. Shoulder pain is persisting but improved. Patient reports bilateral foot pain. She has chronic toe deformities and has been treated for calluses and corns in the past.  Pain is worse with standing and walking. Hurts in her shoes do not seem to be helping as much as they used to. Past Medical History:   Diagnosis Date    Hypertension         Current Outpatient Medications   Medication Sig Dispense Refill    acetaminophen (TYLENOL) 500 MG tablet Take 2 tablets by mouth every 6-8 hours as needed for Pain (max 6 tabs in 24 hour period) 120 tablet 2    DULoxetine (CYMBALTA) 30 MG extended release capsule Take 1 capsule by mouth daily 30 capsule 5    aspirin 81 MG chewable tablet 81 mg daily      ibuprofen (ADVIL;MOTRIN) 600 MG tablet Take 1 tablet by mouth every 6 hours as needed for Pain 20 tablet 0     No current facility-administered medications for this visit. Review of Systems   Constitutional:  Negative for chills. Respiratory: Negative. Cardiovascular: Negative. Gastrointestinal: Negative. Genitourinary: Negative. Musculoskeletal:  Positive for arthralgias and gait problem. Skin: Negative. Psychiatric/Behavioral: Negative. OBJECTIVE:  Physical Exam  Constitutional:       Appearance: Normal appearance.    HENT:      Head: Normocephalic and atraumatic. Cardiovascular:      Rate and Rhythm: Normal rate and regular rhythm. Pulmonary:      Effort: Pulmonary effort is normal.      Breath sounds: Normal breath sounds. Musculoskeletal:      Right foot: Normal capillary refill. Deformity, bunion and bony tenderness present. No swelling. Normal pulse. Left foot: Normal capillary refill. Deformity, bunion and bony tenderness present. No swelling. Normal pulse. Skin:     General: Skin is warm and dry. Neurological:      General: No focal deficit present. Mental Status: She is alert and oriented to person, place, and time. Psychiatric:         Mood and Affect: Mood normal.         Behavior: Behavior normal.      /60   Pulse 63   Ht 5' (1.524 m)   Wt 177 lb (80.3 kg)   SpO2 97%   BMI 34.57 kg/m²      PHQ Scores 1/9/2023 11/15/2022   PHQ2 Score 1 2   PHQ9 Score 8 2     Interpretation of Total Score Depression Severity: 1-4 = Minimal depression, 5-9 = Mild depression, 10-14 = Moderate depression, 15-19 = Moderately severe depression, 20-27 =Severe depression        ASSESSMENT/PLAN:  Leticia was seen today for follow-up and shoulder pain. Diagnoses and all orders for this visit:    Chronic pain of both feet  -     MAK Gomez DPM, Podiatry, [facility]  -     acetaminophen (TYLENOL) 500 MG tablet; Take 2 tablets by mouth every 6-8 hours as needed for Pain (max 6 tabs in 24 hour period)    Toe deformity, right  -     MAK Gomez DPLORETTA, Podiatry, [facility]  -     acetaminophen (TYLENOL) 500 MG tablet; Take 2 tablets by mouth every 6-8 hours as needed for Pain (max 6 tabs in 24 hour period)    Toe deformity, left  -     MAK Gomez DPLORETTA, Podiatry, [facility]  -     acetaminophen (TYLENOL) 500 MG tablet; Take 2 tablets by mouth every 6-8 hours as needed for Pain (max 6 tabs in 24 hour period)    Chronic left shoulder pain  -     acetaminophen (TYLENOL) 500 MG tablet;  Take 2 tablets by mouth every 6-8 hours as needed for Pain (max 6 tabs in 24 hour period)    -Mood better with Cymbalta. Continue.  -Chronic shoulder pain. Continue Cymbalta. Tylenol as needed.  -Acute on chronic foot and toe pain secondary to toe deformities. Will refer to podiatry.       DUANE Lay - CNP

## 2023-02-13 ENCOUNTER — TELEPHONE (OUTPATIENT)
Dept: ORTHOPEDIC SURGERY | Age: 68
End: 2023-02-13

## 2023-02-13 NOTE — TELEPHONE ENCOUNTER
S/W SANTY Pelletier  regarding MRI CSP approval and authorization being valid until 3/26/2023. Patient was instructed that their MRI needs to be scheduled at [facility]. The patient was instructed to contact the facility to schedule  at [phone]. A follow up appointment will need to be scheduled to review the results and treatment plan.

## 2023-02-18 ENCOUNTER — HOSPITAL ENCOUNTER (OUTPATIENT)
Dept: PHYSICAL THERAPY | Age: 68
Setting detail: THERAPIES SERIES
Discharge: HOME | End: 2023-02-18
Payer: COMMERCIAL

## 2023-02-18 PROCEDURE — 97140 MANUAL THERAPY 1/> REGIONS: CPT

## 2023-02-18 PROCEDURE — 97530 THERAPEUTIC ACTIVITIES: CPT

## 2023-02-18 PROCEDURE — 97110 THERAPEUTIC EXERCISES: CPT

## 2023-02-18 NOTE — FLOWSHEET NOTE
Physical Therapy Daily Treatment Note    Date:  2023     Patient Name:  Rita Grover    :  1955  MRN: [id number]  Medical Diagnosis:  Impingement syndrome of left shoulder [M75.42]  Acute pain of left shoulder [M25.512]  Treatment Diagnosis: L shoulder pain, decreased LUE strength, decreased LUE AROM, difficulty reaching OH    Insurance/Certification information:  PT Insurance Information: [employer]  Physician Information:  DUANE Plunkett  Plan of care signed (Y/N): []  Yes [x]  No     Date of Patient follow up with Physician:      Progress Report: []  Yes  [x]  No     Date Range for reporting period:  Beginnin2022  POC: 23  Ending:     Progress report due (10 Rx/or 30 days whichever is less): visit #10 or 8/13/15      Recertification due (POC duration/ or 90 days whichever is less): visit #16 or 3/27/23 (8 weeks)     Visit # Insurance Allowable Auth required? Date Range    20- hard max  2 used  []  Yes  [x]  No PCY     Latex Allergy:  [x]NO      []YES  Preferred Language for Healthcare:   []English       [x]other: Telugu-- required; used video  this date    Functional Scale:           Date assessed:  FOTO physical FS primary measure score = 67; risk adjusted = 49  22  FOTO physical FS primary measure score = 51     23    Pain level:  4/10     SUBJECTIVE:  no change in shoulder pain. Has some pain in the cervical region as well. Says pain is worse at night. Wants to continue PT once a week on  for a while  OBJECTIVE: 2/10: shoulder musculature in the deltoid particularly is rather \"lumpy\" with tolerable pain to palpation  : AROM left shoulder: flex to 74 and abd to 75, both very guarded and slow and needing support on return; ER and IR WFL; able to achieve 130 deg into flex and abd passively. Strength limited by pain into flex and abd. ER/IR at 3+/5 and biceps at 4/5.  Shoulder ext also at 4/5.    : AROM limited from end range and painful overhead L shoulder; able to achieve 135 into flexion today    RESTRICTIONS/PRECAUTIONS: HTN    Exercises/Interventions:     Therapeutic Exercises (51916) Resistance / level Sets/sec Reps Notes   Arm bike  2 min forward/2 min back    Wall slides forward and arcs     Standing cane exercises: flex, abd, OH press, ER     Wall slides   3\" 10 Cues to avoid early SH shrug   SB shld flex in chair      ER with TB at neutral  Chest pull with TB    Ext with TB partial range  Add with TB partial range                         Cable cross:  Mid row          Therapeutic Activities (64857)       Shelf taps       Mod plank on table with push ups                    Extensive discussion and clarification of order for MRI   x                                Neuromuscular Re-ed (27350)       SH ISOs    Ball on wall circles    Partial plank in standing at wall with clock reaching    Cervical ball on wall  -chin tucks  -rot  -abd with 1#  -flex with 1#     X 10  X 20  X 15 B  X 15 B                  Manual Intervention (59516)       LUE distraction      L GHJ mob -posterior, inferior            L UE PROM        Gentle STM left shoulder upper trap/levator/deltoid/cervical paraspinals   x    ktape       Modalities:     Pt. Education:  12/06/2022  -patient educated on diagnosis, prognosis and expectations for rehab  -all patient questions were answered  -educated on postural awareness with all shoulder mobility in order to reduce pain with LUE movement. Home Exercise Program:    Access Code: [email or web address]  URL: [email or web address]  Date: 01/23/2023  Prepared by: Giovani Sandoval    Exercises  Supine Shoulder Flexion AAROM - 1 x daily - 7 x weekly - 3 sets - 10 reps  Seated Shoulder Flexion Towel Slide at Table Top - 1 x daily - 7 x weekly - 3 sets - 10 reps  Shoulder Flexion Wall Slide with Towel - 1 x daily - 7 x weekly - 3 sets - 10 reps  Seated Scapular Retraction - 1 x daily - 7 x weekly - 1 sets - 10 reps - 5 sec hold  Isometric Shoulder Abduction at Wall - 1 x daily - 7 x weekly - 3 sets - 10 reps  Isometric Shoulder External Rotation at Wall - 1 x daily - 7 x weekly - 3 sets - 10 reps  Isometric Shoulder Flexion at Wall - 1 x daily - 7 x weekly - 3 sets - 10 reps      Therapeutic Exercise and NMR EXR  [x] (09278) Provided verbal/tactile cueing for activities related to strengthening, flexibility, endurance, ROM for improvements in  [] LE / Lumbar: LE, proximal hip, and core control with self care, mobility, lifting, ambulation. [] UE / Cervical: cervical, postural, scapular, scapulothoracic and UE control with self care, reaching, carrying, lifting, house/yardwork, driving, computer work.  [] (91102) Provided verbal/tactile cueing for activities related to improving balance, coordination, kinesthetic sense, posture, motor skill, proprioception to assist with   [] LE / lumbar: LE, proximal hip, and core control in self care, mobility, lifting, ambulation and eccentric single leg control. [] UE / cervical: cervical, scapular, scapulothoracic and UE control with self care, reaching, carrying, lifting, house/yardwork, driving, computer work.   [] (68353) Therapist is in constant attendance of 2 or more patients providing skilled therapy interventions, but not providing any significant amount of measurable one-on-one time to either patient, for improvements in  [] LE / lumbar: LE, proximal hip, and core control in self care, mobility, lifting, ambulation and eccentric single leg control. [] UE / cervical: cervical, scapular, scapulothoracic and UE control with self care, reaching, carrying, lifting, house/yardwork, driving, computer work.      NMR and Therapeutic Activities:    [x] (62218 or 75061) Provided verbal/tactile cueing for activities related to improving balance, coordination, kinesthetic sense, posture, motor skill, proprioception and motor activation to allow for proper function of   [] LE: / Lumbar core, proximal hip and LE with self care and ADLs  [x] UE / Cervical: cervical, postural, scapular, scapulothoracic and UE control with self care, carrying, lifting, driving, computer work.   [] (40423) Gait Re-education- Provided training and instruction to the patient for proper LE, core and proximal hip recruitment and positioning and eccentric body weight control with ambulation re-education including up and down stairs     Home Management Training / Self Care:  [] (66232) Provided self-care/home management training related to activities of daily living and compensatory training, and/or use of adaptive equipment for improvement with: ADLs and compensatory training, meal preparation, safety procedures and instruction in use of adaptive equipment, including bathing, grooming, dressing, personal hygiene, basic household cleaning and chores.      Home Exercise Program:    [x] (56311) Reviewed/Progressed HEP activities related to strengthening, flexibility, endurance, ROM of   [] LE / Lumbar: core, proximal hip and LE for functional self-care, mobility, lifting and ambulation/stair navigation   [x] UE / Cervical: cervical, postural, scapular, scapulothoracic and UE control with self care, reaching, carrying, lifting, house/yardwork, driving, computer work  [] (47200)Reviewed/Progressed HEP activities related to improving balance, coordination, kinesthetic sense, posture, motor skill, proprioception of   [] LE: core, proximal hip and LE for self care, mobility, lifting, and ambulation/stair navigation    [] UE / Cervical: cervical, postural,  scapular, scapulothoracic and UE control with self care, reaching, carrying, lifting, house/yardwork, driving, computer work    Manual Treatments:  PROM / STM / Oscillations-Mobs:  G-I, II, III, IV (PA's, Inf., Post.)  [x] (64831) Provided manual therapy to mobilize LE, proximal hip and/or LS spine soft tissue/joints for the purpose of modulating pain, promoting relaxation,  increasing ROM, reducing/eliminating soft tissue swelling/inflammation/restriction, improving soft tissue extensibility and allowing for proper ROM for normal function with   [] LE / lumbar: self care, mobility, lifting and ambulation. [x] UE / Cervical: self care, reaching, carrying, lifting, house/yardwork, driving, computer work. Modalities:  [] (32534) Vasopneumatic compression: Utilized vasopneumatic compression to decrease edema / swelling for the purpose of improving mobility and quad tone / recruitment which will allow for increased overall function including but not limited to self-care, transfers, ambulation, and ascending / descending stairs. Charges:  Timed Code Treatment Minutes: 48   Total Treatment Minutes: 48     [] EVAL - LOW (75970)   [] EVAL - MOD (62298)  [] EVAL - HIGH (48328)  [] RE-EVAL (94296)  [x] MU(96504) x 1    [] Ionto  [] NMR (00172) x       [] Vaso  [x] Manual (78284) x  1     [] Ultrasound  [x] TA x   1      [] Mech Traction (49677)  [] Aquatic Therapy x     [] ES (un) (27774):   [] Home Management Training x  [] ES(attended) (22900)   [] Dry Needling 1-2 muscles (99831):  [] Dry Needling 3+ muscles (895955)  [] Group:      [] Other:     GOALS:   Patient stated goal: \"no pain\"  [] Progressing: [] Met: [] Not Met: [] Adjusted     Therapist goals for Patient:   Short Term Goals: To be achieved in: 2 weeks  1. Independent in HEP and progression per patient tolerance, in order to prevent re-injury. [] Progressing: [] Met: [] Not Met: [] Adjusted  2. Patient will have a decrease in pain to facilitate improvement in movement, function, and ADLs as indicated by Functional Deficits. [] Progressing: [] Met: [] Not Met: [] Adjusted     Long Term Goals: To be achieved in: 6 weeks  1. FOTO score of at least 69 to assist with reaching prior level of function. [] Progressing: [] Met: [] Not Met: [] Adjusted  2.  Patient will demonstrate increased L shoulder flexion AROM to 170 deg to allow for proper joint functioning as indicated by patients Functional Deficits. [] Progressing: [] Met: [] Not Met: [] Adjusted  3. Patient will demonstrate an increase in R shoulder Strength to 5 to allow for proper functional mobility as indicated by patients Functional Deficits. [] Progressing: [] Met: [] Not Met: [] Adjusted  4. Patient will return to functional activities including reaching [place] without increased symptoms or restriction. [] Progressing: [] Met: [] Not Met: [] Adjusted  5. Pt will be able to lift box OH without increased pain and demonstrate improve ergonomics. [] Progressing: [] Met: [] Not Met: [] Adjusted     Overall Progression Towards Functional goals/ Treatment Progress Update:  [] Patient is progressing as expected towards functional goals listed. [] Progression is slowed due to complexities/Impairments listed. [x] Progression has been slowed due to co-morbidities. [] Plan just implemented, too soon to assess goals progression <30days   [] Goals require adjustment due to lack of progress  [] Patient is not progressing as expected and requires additional follow up with physician  [] Other    Persisting Functional Limitations/Impairments:  [x]Sleeping []Sitting               []Standing []Transfers        []Walking []Kneeling               []Stairs []Squatting / bending   [x]ADLs [x]Reaching  [x]Lifting  [x]Housework  [x]Driving [x]Job related tasks  []Sports/Recreation []Other:        ASSESSMENT: patient wants to continue PT but will do so once a week due to financial concerns. Patient to schedule an MRI.   Treatment/Activity Tolerance:  [x] Patient able to complete tx [] Patient limited by fatigue  [x] Patient limited by pain  [] Patient limited by other medical complications  [] Other:     Prognosis: [x] Good [] Fair  [] Poor    Patient Requires Follow-up: [x] Yes  [] No    Plan for next treatment session:post cuff strengthening; postural adjustments    PLAN: See eval. PT 2x / week for 6 weeks. [x] Continue per plan of care [] Alter current plan (see comments)  [] Plan of care initiated [] Hold pending MD visit [] Discharge    Electronically signed by: Darlene Jackson PT, DPT        Note: If patient does not return for scheduled/ recommended follow up visits, this note will serve as a discharge from care along with most recent update on progress.

## 2023-02-20 ENCOUNTER — OFFICE VISIT (OUTPATIENT)
Dept: PSYCHOLOGY | Age: 68
End: 2023-02-20
Payer: COMMERCIAL

## 2023-02-20 DIAGNOSIS — F33.1 MODERATE EPISODE OF RECURRENT MAJOR DEPRESSIVE DISORDER (HCC): Primary | ICD-10-CM

## 2023-02-20 PROCEDURE — 90832 PSYTX W PT 30 MINUTES: CPT | Performed by: PSYCHOLOGIST

## 2023-02-20 PROCEDURE — 1123F ACP DISCUSS/DSCN MKR DOCD: CPT | Performed by: PSYCHOLOGIST

## 2023-02-20 PROCEDURE — 1036F TOBACCO NON-USER: CPT | Performed by: PSYCHOLOGIST

## 2023-02-20 NOTE — PATIENT INSTRUCTIONS
Types of Mental Activities  ____________________________________________________________________________      Contar mosacios en el piso o el techo  Imagine que esta construyendo un mueble  Contar alicia de carros rojos que pasan   Recitar las palabras de jessica conacion u oracion  Imagine que haria si ganara la loteria  Planee las vacaciones de alistair seunos   Niyah jessica lista de \"cosas por hacer\"

## 2023-02-20 NOTE — PROGRESS NOTES
Behavioral Health Consultation  Nadia Webster M.A. Psychology Assistant  Vane Guillaume, Ph.D. Supervising Psychologist  2/20/2023  8:43 AM EST      Time spent with Patient: 38 minutes  This is patient's third  [facility] appointment. Reason for Consult: depression    Feedback given to PCP   used     S:  Pt seen for f/u of depression. Pt reports worsened mood and sxs. She noted she has been feeling down lately, primarily due to unremitting pain. She noted the pain in her arm has been constant for four months and has led to difficulties sleeping. She frequently has sharp pains. She also has pain in her foot and she is worried this may impact her job. She completed an MRI, but does not know the results yet. She noted physical therapy is not helping at all. Discussing strategies to divert attention away from pain. Pt stated her mind is \"blocked\", and she will sometimes forget things.      O:  MSE:    Appearance: good hygiene   Attitude: cooperative and friendly  Consciousness: alert, tearful   Orientation: oriented to person, place, time, general circumstance  Memory: recent and remote memory intact  Attention/Concentration: intact during session  Psychomotor Activity:normal  Eye Contact: normal  Speech: normal rate and volume, well-articulated  Mood: depressed  Affect: dysphoric  Perception: within normal limits  Thought Content: within normal limits  Thought Process: logical, coherent and goal-directed  Insight: good  Judgment: intact  Ability to understand instructions: Yes  Ability to respond meaningfully: Yes  Morbid Ideation: no   Suicide Assessment: no suicidal ideation, plan, or intent  Homicidal Ideation: no     History:    Medications:   Current Outpatient Medications   Medication Sig Dispense Refill    acetaminophen (TYLENOL) 500 MG tablet Take 2 tablets by mouth every 6-8 hours as needed for Pain (max 6 tabs in 24 hour period) 120 tablet 2    DULoxetine (CYMBALTA) 30 MG extended release capsule Take 1 capsule by mouth daily 30 capsule 5    ibuprofen (ADVIL;MOTRIN) 600 MG tablet Take 1 tablet by mouth every 6 hours as needed for Pain 20 tablet 0    aspirin 81 MG chewable tablet 81 mg daily       No current facility-administered medications for this visit. Social History:   Social History     Socioeconomic History    Marital status:      Spouse name: Not on file    Number of children: Not on file    Years of education: Not on file    Highest education level: Not on file   Occupational History    Not on file   Tobacco Use    Smoking status: Never    Smokeless tobacco: Never   Vaping Use    Vaping Use: Never used   Substance and Sexual Activity    Alcohol use: No    Drug use: No     Frequency: 3.0 times per week    Sexual activity: Not on file   Other Topics Concern    Not on file   Social History Narrative    Not on file     Social Determinants of Health     Financial Resource Strain: Medium Risk    Difficulty of Paying Living Expenses: Somewhat hard   Food Insecurity: No Food Insecurity    Worried About Running Out of Food in the Last Year: Never true    Ran Out of Food in the Last Year: Never true   Transportation Needs: Unknown    Lack of Transportation (Medical): Not on file    Lack of Transportation (Non-Medical): No   Physical Activity: Not on file   Stress: Not on file   Social Connections: Not on file   Intimate Partner Violence: Not on file   Housing Stability: Unknown    Unable to Pay for Housing in the Last Year: Not on file    Number of Places Lived in the Last Year: Not on file    Unstable Housing in the Last Year: No     TOBACCO:   reports that she has never smoked. She has never used smokeless tobacco.  ETOH:   reports no history of alcohol use. Family History:   Family History   Problem Relation Age of Onset    Diabetes Sister      A:   PHQ-9 and CLAUDIA-7 not administered at this visit (see below).      Diagnosis:    Major depressive disorder; recurrent and moderate  R/O CLAUDIA        Diagnosis Date    Hypertension      Plan:  Pt interventions:  Discussed use of imagery, distractions, relaxation, mood management, communication training, questioning unhelpful thinking, problem-solving, and behavioral activation to manage pain, Established rapport, [place]-setting to identify pt's primary goals for [facility] visit / overall health, and Supportive techniques    Pt Behavioral Change Plan:   See Pt Instructions

## 2023-02-24 ENCOUNTER — OFFICE VISIT (OUTPATIENT)
Dept: ORTHOPEDIC SURGERY | Age: 68
End: 2023-02-24
Payer: COMMERCIAL

## 2023-02-24 VITALS — RESPIRATION RATE: 16 BRPM | BODY MASS INDEX: 34.36 KG/M2 | HEIGHT: 60 IN | WEIGHT: 175 LBS

## 2023-02-24 DIAGNOSIS — M50.00 HNP (HERNIATED NUCLEUS PULPOSUS) WITH MYELOPATHY, CERVICAL: ICD-10-CM

## 2023-02-24 DIAGNOSIS — M48.02 FORAMINAL STENOSIS OF CERVICAL REGION: ICD-10-CM

## 2023-02-24 DIAGNOSIS — M54.12 CERVICAL RADICULOPATHY: Primary | ICD-10-CM

## 2023-02-24 PROCEDURE — 3017F COLORECTAL CA SCREEN DOC REV: CPT | Performed by: STUDENT IN AN ORGANIZED HEALTH CARE EDUCATION/TRAINING PROGRAM

## 2023-02-24 PROCEDURE — 1090F PRES/ABSN URINE INCON ASSESS: CPT | Performed by: STUDENT IN AN ORGANIZED HEALTH CARE EDUCATION/TRAINING PROGRAM

## 2023-02-24 PROCEDURE — 99213 OFFICE O/P EST LOW 20 MIN: CPT | Performed by: STUDENT IN AN ORGANIZED HEALTH CARE EDUCATION/TRAINING PROGRAM

## 2023-02-24 PROCEDURE — G8427 DOCREV CUR MEDS BY ELIG CLIN: HCPCS | Performed by: STUDENT IN AN ORGANIZED HEALTH CARE EDUCATION/TRAINING PROGRAM

## 2023-02-24 PROCEDURE — G8417 CALC BMI ABV UP PARAM F/U: HCPCS | Performed by: STUDENT IN AN ORGANIZED HEALTH CARE EDUCATION/TRAINING PROGRAM

## 2023-02-24 PROCEDURE — 1036F TOBACCO NON-USER: CPT | Performed by: STUDENT IN AN ORGANIZED HEALTH CARE EDUCATION/TRAINING PROGRAM

## 2023-02-24 PROCEDURE — 1123F ACP DISCUSS/DSCN MKR DOCD: CPT | Performed by: STUDENT IN AN ORGANIZED HEALTH CARE EDUCATION/TRAINING PROGRAM

## 2023-02-24 PROCEDURE — G8400 PT W/DXA NO RESULTS DOC: HCPCS | Performed by: STUDENT IN AN ORGANIZED HEALTH CARE EDUCATION/TRAINING PROGRAM

## 2023-02-24 PROCEDURE — G8484 FLU IMMUNIZE NO ADMIN: HCPCS | Performed by: STUDENT IN AN ORGANIZED HEALTH CARE EDUCATION/TRAINING PROGRAM

## 2023-02-24 NOTE — LETTER
[facility]  [address]  [address]  Phone: [phone]  Fax: [phone]    Marcos Pinto, [address]        February 24, 2023     Patient: Lena Jorgensen   YOB: 1955   Date of Visit: 2/24/2023       To Whom it May Concern:    Roland Blackmon was seen in my clinic on 2/24/2023. She is being treated for cervical radiculopathy. If you have any questions or concerns, please don't hesitate to call.     Sincerely,       AXEL Dunn, MIGUEL

## 2023-02-24 NOTE — PROGRESS NOTES
New Patient: CERVICAL SPINE    Referring Provider:  DUANE Rosa*    CHIEF COMPLAINT:    Chief Complaint   Patient presents with    Results     MRI C-spine       HISTORY OF PRESENT ILLNESS:    Ms. Lopez Domínguez is a pleasant 78 y.o. old female here for consultation regarding her neck and left arm pain. She states the pain began without injury about 4 months ago. Her pain has steadily worsened since then. She rates her neck pain 7/10 and shoulder and arm pain 7/10. She describes the pain as aching, constant. Pain is worse with movement, lifting, overhead motion and slightly better with nothing. She has tried oral steroids and physical therapy with no relief. The arm pain radiates to her triceps along C6. She denies numbness and tingling in a distribution but reports cramping in her hand, worse when she is lying flat on her back. She reports weakness of her left arm. Patient reports some difficulty with fine motor skills like using her phone. She denies lower extremity symptoms, gait abnormality and bowel or bladder dysfunction. The pain interferes with her sleep. She has been to 4 visits of PT so far with little relief. She presents with a  on the iPad and it is a little difficult to obtain a detailed history due to the language barrier. Interval history: The patient presents for follow up of her cervical MRI. She rates pain 7/10. Pain continues to radiate to her left shoulder. No other change from initial history.        Current/Past Treatment:   Physical Therapy: 6 visits so far   Chiropractic:  none   Injection:  none   Medications:    NSAIDS:  ibuprofen 600 mg   Muscle relaxer:  NONE  Steriods:  MDP x2 with no relief   Neuropathic medications:  NONE  Opioids: NONE  Other: NONE   Surgery/Consult NONE    Past Medical History:   Past Medical History:   Diagnosis Date    Hypertension       Past Surgical History:     Past Surgical History:   Procedure Laterality Date    BUNIONECTOMY COLONOSCOPY N/A 9/6/2019    COLONOSCOPY performed by Nick Goyal MD at [address]  4/2/12    Dilatation and curetage, hysteroscopy, endometrial biopsy    DILATION AND CURETTAGE OF UTERUS  03/09/2017    HYSTEROSCOPY DILATATION AND CURETTAGE MYOSURE    HYSTEROSCOPY      HYSTEROSCOPY N/A 08/16/2018    HYSTEROSCOPY DILATATION AND CURETTAGE MYOSURE    UPPER GASTROINTESTINAL ENDOSCOPY N/A 3/1/2021    EGD BIOPSY performed by Nick Goyal MD at [address] [facility] ENDOSCOPY     Current Medications:     Current Outpatient Medications:     acetaminophen (TYLENOL) 500 MG tablet, Take 2 tablets by mouth every 6-8 hours as needed for Pain (max 6 tabs in 24 hour period), Disp: 120 tablet, Rfl: 2    DULoxetine (CYMBALTA) 30 MG extended release capsule, Take 1 capsule by mouth daily, Disp: 30 capsule, Rfl: 5    ibuprofen (ADVIL;MOTRIN) 600 MG tablet, Take 1 tablet by mouth every 6 hours as needed for Pain, Disp: 20 tablet, Rfl: 0    aspirin 81 MG chewable tablet, 81 mg daily, Disp: , Rfl:   Allergies:  Patient has no known allergies. Social History:    reports that she has never smoked. She has never used smokeless tobacco. She reports that she does not drink alcohol and does not use drugs. Family History:   Family History   Problem Relation Age of Onset    Diabetes Sister        REVIEW OF SYSTEMS: Full ROS noted & scanned   CONSTITUTIONAL: Denies unexplained weight loss, fevers, chills or fatigue  NEUROLOGICAL: Denies unsteady gait or progressive weakness  MUSCULOSKELETAL: Denies joint swelling or redness  PSYCHOLOGICAL: Denies anxiety, depression   SKIN: Denies skin changes, delayed healing, rash, itching   HEMATOLOGIC: Denies easy bleeding or bruising  ENDOCRINE: Denies excessive thirst, urination, heat/cold  RESPIRATORY: Denies current dyspnea, cough  GI: Denies nausea, vomiting, diarrhea   : Denies bowel or bladder issues      PHYSICAL EXAM:    Vitals: Resp.  rate 16, height 5' (1.524 m), weight 175 lb (79.4 kg), not currently breastfeeding. GENERAL EXAM:  General Apparence: Patient is adequately groomed with no evidence of malnutrition. Orientation: The patient is oriented to time, place and person. Mood & Affect:The patient's mood and affect are appropriate. Vascular: Examination reveals no swelling tenderness in upper or lower extremities. Good capillary refill. Lymphatic: The lymphatic examination bilaterally reveals all areas to be without enlargement or induration  Sensation: Sensation is intact without deficit  Coordination/Balance: Good coordination     CERVICAL EXAMINATION:  Inspection: Local inspection shows no step-off or bruising. Cervical alignment is normal.     Palpation: No evidence of tenderness at the midline, and trapezius. Paraspinal tenderness is not present. There is no step-off or paraspinal spasm. Range of Motion: Range of Motion:  limited by 25% in all planes due to pain   Strength: 5/5 bilateral upper extremities   Special Tests:      Spurling's +left, L'Hermitte's & Mata's negative bilaterally. Horowitz and Impingement tests are negative bilaterally. Cubital and Carpal tunnel Tinel's negative bilaterally. Skin:There are no rashes, ulcerations or lesions in right & left upper extremities. Reflexes: Bilaterally triceps, biceps and brachioradialis are 2+. Clonus absent bilaterally at the feet. Additional Examinations:       RIGHT UPPER EXTREMITY:  Inspection/examination of the right upper extremity does not show any tenderness, deformity or injury. Range of motion is full. There is no gross instability. There are no rashes, ulcerations or lesions. Strength and tone are normal.  LEFT UPPER EXTREMITY: Inspection/examination of the left upper extremity does not show any tenderness, deformity or injury. Range of motion is full. There is no gross instability. There are no rashes, ulcerations or lesions.  Strength and tone are normal.      Diagnostic Testing:    Reviewed AP and lateral cervical spine films taken today in the office: No acute abnormality or fracture noted. Normal height and configuration of vertebral bodies. Straightening of the normal lordotic curve. Degenerative disc disease at C3-7, moderate. Anterior spurring. MRI CSP:  FINDINGS:  Straightening of the spine may reflect strain. C2-3: No focal protrusion or stenosis. C3-4: Moderate to severe bilateral foraminal stenosis due to a combination of facet disease and    disc osteophyte complex(es); worse on the left. C4-5: Mixed disc ridge mildly abuts the cord. Moderate to severe bilateral foraminal stenosis    due to a combination of facet disease and disc osteophyte complex(es). C5-6: Endplate reactive changes. Desiccated central leftward 2-3mm protrusion-type herniation,    abuts the cord. Blends with an osteophyte on the left contributing to severe left foraminal    stenosis. Additionally, moderate to severe right foraminal stenosis due to osteophyte. C6-7: Moderate bilateral foraminal stenosis due to a combination of facet disease and disc    osteophyte complex(es).       C7-T1: Minimal anterolisthesis. Scoliosis. Marrow signal suggesting osteopenia/osteoporosis. Remote wedging of the vertebral body(s). Otherwise, the remaining craniocervical junction, cord, and ligaments are unremarkable. Partially assessed heterogeneous thyroid gland. Consider ultrasound. Impression:    Diagnosis Orders   1. Cervical radiculopathy  MAK - Aristeo Barcenas MD, Pain Management, [facility]      2. HNP (herniated nucleus pulposus) with myelopathy, cervical        3. Foraminal stenosis of cervical region              Plan:    Above diagnoses are Worsening    1. Medications: No further medications. 2. PT:   Continue with physical therapy. 3. Further studies: We reviewed her MRI results in the office. 4. Interventional:  We discussed pursuing a C6/C7 interlaminar epidural steroid injection to address the pain. Radiologic imaging and symptoms confirm the pain etiology. Risks, benefits and alternatives of interventional options were discussed. These include and are not limited to bleeding, infection, spinal headache, nerve injury, increased pain and lack of pain relief. The patient verbalized understanding and would like to proceed. The patient will be scheduled accordingly.           Kaiden Tucker PA-C  Board Certified by the [employer] on Certification of [facility] and [employer]

## 2023-02-25 ENCOUNTER — HOSPITAL ENCOUNTER (OUTPATIENT)
Dept: PHYSICAL THERAPY | Age: 68
Setting detail: THERAPIES SERIES
Discharge: HOME | End: 2023-02-25
Payer: COMMERCIAL

## 2023-02-25 PROCEDURE — 97140 MANUAL THERAPY 1/> REGIONS: CPT

## 2023-02-25 PROCEDURE — 97530 THERAPEUTIC ACTIVITIES: CPT

## 2023-02-25 NOTE — PROGRESS NOTES
Physical Therapy Daily Treatment Note    Date:  2023     Patient Name:  Neeta Howell    :  1955  MRN: [id number]  Medical Diagnosis:  Impingement syndrome of left shoulder [M75.42]  Acute pain of left shoulder [M25.512]  Treatment Diagnosis: L shoulder pain, decreased LUE strength, decreased LUE AROM, difficulty reaching OH    Insurance/Certification information:  PT Insurance Information: [employer]  Physician Information:  DUANE López  Plan of care signed (Y/N): []  Yes [x]  No     Date of Patient follow up with Physician:      Progress Report: [x]  Yes  []  No     Date Range for reporting period:  Beginnin2022  POC: 23  Endin23    Progress report due (10 Rx/or 30 days whichever is less): visit #10 or 3/42/92      Recertification due (POC duration/ or 90 days whichever is less): visit #16 or 3/27/23 (8 weeks)     Visit # Insurance Allowable Auth required? Date Range    20- hard max  2 used  []  Yes  [x]  No PCY     Latex Allergy:  [x]NO      []YES  Preferred Language for Healthcare:   []English       [x]other: Burmese-- required; used video  this date    Functional Scale:           Date assessed:  FOTO physical FS primary measure score = 67; risk adjusted = 49  22  FOTO physical FS primary measure score = 51     23    Pain level:  410     SUBJECTIVE:  received MRI results. See below. She has been told that she can have surgery or injections. She is opting for injections because she will not be able to easily take time off work for surgery. Was tearful when reporting this information. Having trouble sleeping through the night due to pain in the left arm and in the left lower cervical region. OBJECTIVE: : continues to demonstrate difficulty with AROM left shoulder into elevation; able to achieve 78 deg into flex and 80 deg into abd today with the left arm    MRI:   CONCLUSION:   1.  C5-6 endplate reactive changes. Desiccated central leftward 2-3mm protrusion-type    herniation, abuts the cord. Blends with an osteophyte on the left contributing to severe left    foraminal stenosis. 2. Partially assessed heterogeneous thyroid gland. Consider ultrasound. 2/10: shoulder musculature in the deltoid particularly is rather \"lumpy\" with tolerable pain to palpation  2/7: AROM left shoulder: flex to 74 and abd to 75, both very guarded and slow and needing support on return; ER and IR WFL; able to achieve 130 deg into flex and abd passively. Strength limited by pain into flex and abd. ER/IR at 3+/5 and biceps at 4/5.  Shoulder ext also at 4/5.    1/30: AROM limited from end range and painful overhead L shoulder; able to achieve 135 into flexion today    RESTRICTIONS/PRECAUTIONS: HTN    Exercises/Interventions:     Therapeutic Exercises (10754) Resistance / level Sets/sec Reps Notes   Arm bike     Wall slides forward and arcs     Standing cane exercises: flex, abd, OH press, ER     Wall slides   3\" 10 Cues to avoid early SH shrug   SB shld flex in chair      ER with TB at neutral  Chest pull with TB    Ext with TB partial range  Add with TB partial range                         Cable cross:  Mid row          Therapeutic Activities (88131)       Shelf taps       Mod plank on table with push ups                    Extensive discussion and review of MRI results   x    Sternal lift posture reviewed and encouraged   x                         Neuromuscular Re-ed (87739)       SH ISOs    Ball on wall circles    Partial plank in standing at wall with clock reaching    Cervical ball on wall  -chin tucks  -rot  -abd with 1#  -flex with 1#                      Manual Intervention (21275)       LUE distraction      L GHJ mob -posterior, inferior      Cervical distraction central and paracentral  SOR  Mid thoracic PA mobs grade 3  Cervical PA mobs grade 1 and 2     x    L UE PROM        Gentle STM left shoulder upper trap/levator/deltoid/cervical paraspinals   x    ktape       Modalities:     Pt. Education:2/25: see grid  12/06/2022  -patient educated on diagnosis, prognosis and expectations for rehab  -all patient questions were answered  -educated on postural awareness with all shoulder mobility in order to reduce pain with LUE movement. Home Exercise Program:    Access Code: [email or web address]  URL: Do It Original/  Date: 01/23/2023  Prepared by: Mai Mckeon    Exercises  Supine Shoulder Flexion AAROM - 1 x daily - 7 x weekly - 3 sets - 10 reps  Seated Shoulder Flexion Towel Slide at Table Top - 1 x daily - 7 x weekly - 3 sets - 10 reps  Shoulder Flexion Wall Slide with Towel - 1 x daily - 7 x weekly - 3 sets - 10 reps  Seated Scapular Retraction - 1 x daily - 7 x weekly - 1 sets - 10 reps - 5 sec hold  Isometric Shoulder Abduction at Wall - 1 x daily - 7 x weekly - 3 sets - 10 reps  Isometric Shoulder External Rotation at Wall - 1 x daily - 7 x weekly - 3 sets - 10 reps  Isometric Shoulder Flexion at Wall - 1 x daily - 7 x weekly - 3 sets - 10 reps      Therapeutic Exercise and NMR EXR  [] (54422) Provided verbal/tactile cueing for activities related to strengthening, flexibility, endurance, ROM for improvements in  [] LE / Lumbar: LE, proximal hip, and core control with self care, mobility, lifting, ambulation. [] UE / Cervical: cervical, postural, scapular, scapulothoracic and UE control with self care, reaching, carrying, lifting, house/yardwork, driving, computer work.  [] (07821) Provided verbal/tactile cueing for activities related to improving balance, coordination, kinesthetic sense, posture, motor skill, proprioception to assist with   [] LE / lumbar: LE, proximal hip, and core control in self care, mobility, lifting, ambulation and eccentric single leg control.    [] UE / cervical: cervical, scapular, scapulothoracic and UE control with self care, reaching, carrying, lifting, house/yardwork, driving, computer work.   [] (19726) Therapist is in constant attendance of 2 or more patients providing skilled therapy interventions, but not providing any significant amount of measurable one-on-one time to either patient, for improvements in  [] LE / lumbar: LE, proximal hip, and core control in self care, mobility, lifting, ambulation and eccentric single leg control. [] UE / cervical: cervical, scapular, scapulothoracic and UE control with self care, reaching, carrying, lifting, house/yardwork, driving, computer work. NMR and Therapeutic Activities:    [x] (29732 or 43899) Provided verbal/tactile cueing for activities related to improving balance, coordination, kinesthetic sense, posture, motor skill, proprioception and motor activation to allow for proper function of   [] LE: / Lumbar core, proximal hip and LE with self care and ADLs  [x] UE / Cervical: cervical, postural, scapular, scapulothoracic and UE control with self care, carrying, lifting, driving, computer work.   [] (49666) Gait Re-education- Provided training and instruction to the patient for proper LE, core and proximal hip recruitment and positioning and eccentric body weight control with ambulation re-education including up and down stairs     Home Management Training / Self Care:  [] (24953) Provided self-care/home management training related to activities of daily living and compensatory training, and/or use of adaptive equipment for improvement with: ADLs and compensatory training, meal preparation, safety procedures and instruction in use of adaptive equipment, including bathing, grooming, dressing, personal hygiene, basic household cleaning and chores.      Home Exercise Program:    [x] (41340) Reviewed/Progressed HEP activities related to strengthening, flexibility, endurance, ROM of   [] LE / Lumbar: core, proximal hip and LE for functional self-care, mobility, lifting and ambulation/stair navigation   [x] UE / Cervical: cervical, postural, scapular, scapulothoracic and UE control with self care, reaching, carrying, lifting, house/yardwork, driving, computer work  [] (10751)Reviewed/Progressed HEP activities related to improving balance, coordination, kinesthetic sense, posture, motor skill, proprioception of   [] LE: core, proximal hip and LE for self care, mobility, lifting, and ambulation/stair navigation    [] UE / Cervical: cervical, postural,  scapular, scapulothoracic and UE control with self care, reaching, carrying, lifting, house/yardwork, driving, computer work    Manual Treatments:  PROM / STM / Oscillations-Mobs:  G-I, II, III, IV (PA's, Inf., Post.)  [x] (02825) Provided manual therapy to mobilize LE, proximal hip and/or LS spine soft tissue/joints for the purpose of modulating pain, promoting relaxation,  increasing ROM, reducing/eliminating soft tissue swelling/inflammation/restriction, improving soft tissue extensibility and allowing for proper ROM for normal function with   [] LE / lumbar: self care, mobility, lifting and ambulation. [x] UE / Cervical: self care, reaching, carrying, lifting, house/yardwork, driving, computer work. Modalities:  [] (40148) Vasopneumatic compression: Utilized vasopneumatic compression to decrease edema / swelling for the purpose of improving mobility and quad tone / recruitment which will allow for increased overall function including but not limited to self-care, transfers, ambulation, and ascending / descending stairs.        Charges:  Timed Code Treatment Minutes: 47   Total Treatment Minutes: 47     [] EVAL - LOW (61686)   [] EVAL - MOD (87465)  [] EVAL - HIGH (72729)  [] RE-EVAL (83222)  [] LO(85893) x 1    [] Ionto  [] NMR (58173) x       [] Vaso  [x] Manual (28095) x  2     [] Ultrasound  [x] TA x   1      [] Mech Traction (57431)  [] Aquatic Therapy x     [] ES (un) (95791):   [] Home Management Training x  [] ES(attended) (66989)   [] Dry Needling 1-2 muscles (79700):  [] Dry Needling 3+ muscles (375811)  [] Group:      [] Other:     GOALS:   Patient stated goal: \"no pain\"  [] Progressing: [] Met: [] Not Met: [] Adjusted     Therapist goals for Patient:   Short Term Goals: To be achieved in: 2 weeks  1. Independent in HEP and progression per patient tolerance, in order to prevent re-injury. [] Progressing: [x] Met: [] Not Met: [] Adjusted  2. Patient will have a decrease in pain to facilitate improvement in movement, function, and ADLs as indicated by Functional Deficits. [x] Progressing: [] Met: [] Not Met: [] Adjusted     Long Term Goals: To be achieved in: 6 weeks  1. FOTO score of at least 69 to assist with reaching prior level of function. NT THIS DATE  [] Progressing: [] Met: [] Not Met: [] Adjusted  2. Patient will demonstrate increased L shoulder flexion AROM to 170 deg to allow for proper joint functioning as indicated by patients Functional Deficits. [x] Progressing: [] Met: [] Not Met: [] Adjusted  3. Patient will demonstrate an increase in R shoulder Strength to 5 to allow for proper functional mobility as indicated by patients Functional Deficits. [x] Progressing: [] Met: [] Not Met: [] Adjusted  4. Patient will return to functional activities including reaching [place] without increased symptoms or restriction. [x] Progressing: [] Met: [] Not Met: [] Adjusted  5. Pt will be able to lift box OH without increased pain and demonstrate improve ergonomics. [x] Progressing: [] Met: [] Not Met: [] Adjusted     Overall Progression Towards Functional goals/ Treatment Progress Update:  [] Patient is progressing as expected towards functional goals listed. [] Progression is slowed due to complexities/Impairments listed. [x] Progression has been slowed due to co-morbidities.   [] Plan just implemented, too soon to assess goals progression <30days   [] Goals require adjustment due to lack of progress  [] Patient is not progressing as expected and requires additional follow up with physician  [] Other    Persisting Functional Limitations/Impairments:  [x]Sleeping []Sitting               []Standing []Transfers        []Walking []Kneeling               []Stairs []Squatting / bending   [x]ADLs [x]Reaching  [x]Lifting  [x]Housework  [x]Driving [x]Job related tasks  []Sports/Recreation []Other:        ASSESSMENT: patient has learned that likely her shoulder pain, weakness and stiffness is a result of cord compression at her neck. She is to have injections to manage this and possibly surgery in the future. Today's visit was focused on education of results and posture improvements required to decrease strain in cervical region and pain relief strategies for the spine. She tolerated it well and understood all education with the use of video . Treatment/Activity Tolerance:  [x] Patient able to complete tx [] Patient limited by fatigue  [x] Patient limited by pain  [] Patient limited by other medical complications  [] Other:     Prognosis: [x] Good [] Fair  [] Poor    Patient Requires Follow-up: [x] Yes  [] No    Plan for next treatment session:continue manual for cspine, cervical stabs    PLAN: See keegan. PT 2x / week for 6 weeks. [x] Continue per plan of care [] Alter current plan (see comments)  [] Plan of care initiated [] Hold pending MD visit [] Discharge    Electronically signed by: Kimberly Garcia PT, DPT        Note: If patient does not return for scheduled/ recommended follow up visits, this note will serve as a discharge from care along with most recent update on progress.

## 2023-03-04 ENCOUNTER — HOSPITAL ENCOUNTER (OUTPATIENT)
Dept: PHYSICAL THERAPY | Age: 68
Setting detail: THERAPIES SERIES
Discharge: HOME | End: 2023-03-04
Payer: COMMERCIAL

## 2023-03-04 PROCEDURE — 97110 THERAPEUTIC EXERCISES: CPT

## 2023-03-04 PROCEDURE — 97140 MANUAL THERAPY 1/> REGIONS: CPT

## 2023-03-04 NOTE — FLOWSHEET NOTE
Physical Therapy Daily Treatment Note    Date:  2023     Patient Name:  Jane Phelps    :  1955  MRN: [id number]  Medical Diagnosis:  Impingement syndrome of left shoulder [M75.42]  Acute pain of left shoulder [M25.512]  Treatment Diagnosis: L shoulder pain, decreased LUE strength, decreased LUE AROM, difficulty reaching OH    Insurance/Certification information:  PT Insurance Information: [employer]  Physician Information:  DUANE Hammond  Plan of care signed (Y/N): []  Yes [x]  No     Date of Patient follow up with Physician:      Progress Report: [x]  Yes  []  No     Date Range for reporting period:  Beginnin2022  POC: 23  Endin23    Progress report due (10 Rx/or 30 days whichever is less): visit #10 or       Recertification due (POC duration/ or 90 days whichever is less): visit #16 or 3/27/23 (8 weeks)     Visit # Insurance Allowable Auth required? Date Range    20- hard max  2 used  []  Yes  [x]  No PCY     Latex Allergy:  [x]NO      []YES  Preferred Language for Healthcare:   []English       [x]other: Syrian-- required; used video  this date    Functional Scale:           Date assessed:  FOTO physical FS primary measure score = 67; risk adjusted = 49  22  FOTO physical FS primary measure score = 51     23    Pain level:  4/10     SUBJECTIVE:  having appointment for cervical injection on 3/20. Wants to hold on therapy until after that appointment, which is appropriate. Will keep chart open to address any lingering pain following injections. Patient having much difficulty sleeping through the night due to pain, so was encouraged to contact her doctor to see if sleeping medication is appropriate. OBJECTIVE: : continues to demonstrate difficulty with AROM left shoulder into elevation; able to achieve 78 deg into flex and 80 deg into abd today with the left arm    MRI:   CONCLUSION:   1.  C5-6 endplate reactive changes. Desiccated central leftward 2-3mm protrusion-type    herniation, abuts the cord. Blends with an osteophyte on the left contributing to severe left    foraminal stenosis. 2. Partially assessed heterogeneous thyroid gland. Consider ultrasound. 2/10: shoulder musculature in the deltoid particularly is rather \"lumpy\" with tolerable pain to palpation  2/7: AROM left shoulder: flex to 74 and abd to 75, both very guarded and slow and needing support on return; ER and IR WFL; able to achieve 130 deg into flex and abd passively. Strength limited by pain into flex and abd. ER/IR at 3+/5 and biceps at 4/5.  Shoulder ext also at 4/5.    1/30: AROM limited from end range and painful overhead L shoulder; able to achieve 135 into flexion today    RESTRICTIONS/PRECAUTIONS: HTN    Exercises/Interventions:     Therapeutic Exercises (87659) Resistance / level Sets/sec Reps Notes   Arm bike  2 min forward/2 min back    Wall slides forward and arcs  X 10 each B    Standing cane exercises: flex, abd, OH press, ER     Wall slides   Cues to avoid early SH shrug   SB shld flex in chair      ER with TB at neutral  Chest pull with TB    Ext with TB partial range  Add with TB partial range                         Cable cross:  Mid row     20#    X 15           Therapeutic Activities (74388)       Shelf taps       Mod plank on table with push ups                    Extensive discussion and review of MRI results      Sternal lift posture reviewed and encouraged                           Neuromuscular Re-ed (17504)       SH ISOs    Ball on wall circles    Partial plank in standing at wall with clock reaching    Cervical ball on wall  -chin tucks  -rot  -abd with 0#  -flex with 0#  -scap retraction     X 10  X 10  X 10 B  X 10 B  X 10                  Manual Intervention (49400)   30 min    LUE distraction      L GHJ mob -posterior, inferior      Cervical distraction central and paracentral  SOR  Mid thoracic PA mobs grade 3  Cervical PA mobs grade 1 and 2     X    X  X  x    L UE PROM        Gentle STM left shoulder upper trap/levator/deltoid/cervical paraspinals   x    ktape       Modalities:     Pt. Education:2/25: see grid  12/06/2022  -patient educated on diagnosis, prognosis and expectations for rehab  -all patient questions were answered  -educated on postural awareness with all shoulder mobility in order to reduce pain with LUE movement. Home Exercise Program:    Access Code: [email or web address]  URL: "[employer]"/  Date: 01/23/2023  Prepared by: Beryle Dine    Exercises  Supine Shoulder Flexion AAROM - 1 x daily - 7 x weekly - 3 sets - 10 reps  Seated Shoulder Flexion Towel Slide at Table Top - 1 x daily - 7 x weekly - 3 sets - 10 reps  Shoulder Flexion Wall Slide with Towel - 1 x daily - 7 x weekly - 3 sets - 10 reps  Seated Scapular Retraction - 1 x daily - 7 x weekly - 1 sets - 10 reps - 5 sec hold  Isometric Shoulder Abduction at Wall - 1 x daily - 7 x weekly - 3 sets - 10 reps  Isometric Shoulder External Rotation at Wall - 1 x daily - 7 x weekly - 3 sets - 10 reps  Isometric Shoulder Flexion at Wall - 1 x daily - 7 x weekly - 3 sets - 10 reps      Therapeutic Exercise and NMR EXR  [x] (71198) Provided verbal/tactile cueing for activities related to strengthening, flexibility, endurance, ROM for improvements in  [] LE / Lumbar: LE, proximal hip, and core control with self care, mobility, lifting, ambulation. [] UE / Cervical: cervical, postural, scapular, scapulothoracic and UE control with self care, reaching, carrying, lifting, house/yardwork, driving, computer work.  [] (82884) Provided verbal/tactile cueing for activities related to improving balance, coordination, kinesthetic sense, posture, motor skill, proprioception to assist with   [] LE / lumbar: LE, proximal hip, and core control in self care, mobility, lifting, ambulation and eccentric single leg control.    [] UE / cervical: cervical, scapular, scapulothoracic and UE control with self care, reaching, carrying, lifting, house/yardwork, driving, computer work.   [] (13369) Therapist is in constant attendance of 2 or more patients providing skilled therapy interventions, but not providing any significant amount of measurable one-on-one time to either patient, for improvements in  [] LE / lumbar: LE, proximal hip, and core control in self care, mobility, lifting, ambulation and eccentric single leg control. [] UE / cervical: cervical, scapular, scapulothoracic and UE control with self care, reaching, carrying, lifting, house/yardwork, driving, computer work. NMR and Therapeutic Activities:    [] (34901 or 55301) Provided verbal/tactile cueing for activities related to improving balance, coordination, kinesthetic sense, posture, motor skill, proprioception and motor activation to allow for proper function of   [] LE: / Lumbar core, proximal hip and LE with self care and ADLs  [] UE / Cervical: cervical, postural, scapular, scapulothoracic and UE control with self care, carrying, lifting, driving, computer work.   [] (73745) Gait Re-education- Provided training and instruction to the patient for proper LE, core and proximal hip recruitment and positioning and eccentric body weight control with ambulation re-education including up and down stairs     Home Management Training / Self Care:  [] (33872) Provided self-care/home management training related to activities of daily living and compensatory training, and/or use of adaptive equipment for improvement with: ADLs and compensatory training, meal preparation, safety procedures and instruction in use of adaptive equipment, including bathing, grooming, dressing, personal hygiene, basic household cleaning and chores.      Home Exercise Program:    [x] (23560) Reviewed/Progressed HEP activities related to strengthening, flexibility, endurance, ROM of   [] LE / Lumbar: core, proximal hip and LE for functional self-care, mobility, lifting and ambulation/stair navigation   [x] UE / Cervical: cervical, postural, scapular, scapulothoracic and UE control with self care, reaching, carrying, lifting, house/yardwork, driving, computer work  [] (57695)Reviewed/Progressed HEP activities related to improving balance, coordination, kinesthetic sense, posture, motor skill, proprioception of   [] LE: core, proximal hip and LE for self care, mobility, lifting, and ambulation/stair navigation    [] UE / Cervical: cervical, postural,  scapular, scapulothoracic and UE control with self care, reaching, carrying, lifting, house/yardwork, driving, computer work    Manual Treatments:  PROM / STM / Oscillations-Mobs:  G-I, II, III, IV (PA's, Inf., Post.)  [x] (62466) Provided manual therapy to mobilize LE, proximal hip and/or LS spine soft tissue/joints for the purpose of modulating pain, promoting relaxation,  increasing ROM, reducing/eliminating soft tissue swelling/inflammation/restriction, improving soft tissue extensibility and allowing for proper ROM for normal function with   [] LE / lumbar: self care, mobility, lifting and ambulation. [x] UE / Cervical: self care, reaching, carrying, lifting, house/yardwork, driving, computer work. Modalities:  [] (99523) Vasopneumatic compression: Utilized vasopneumatic compression to decrease edema / swelling for the purpose of improving mobility and quad tone / recruitment which will allow for increased overall function including but not limited to self-care, transfers, ambulation, and ascending / descending stairs.        Charges:  Timed Code Treatment Minutes: 42   Total Treatment Minutes: 42     [] EVAL - LOW (27678)   [] EVAL - MOD (42131)  [] EVAL - HIGH (52227)  [] RE-EVAL (90861)  [x] DV(56712) x 1    [] Ionto  [] NMR (61302) x       [] Vaso  [x] Manual (73867) x  2     [] Ultrasound  [] TA x         [] Mech Traction (91186)  [] Aquatic Therapy x     [] ES (un) (09489):   [] Home Management Training x  [] ES(attended) (03922)   [] Dry Needling 1-2 muscles (07063):  [] Dry Needling 3+ muscles (672284)  [] Group:      [] Other:     GOALS:   Patient stated goal: \"no pain\"  [] Progressing: [] Met: [] Not Met: [] Adjusted     Therapist goals for Patient:   Short Term Goals: To be achieved in: 2 weeks  1. Independent in HEP and progression per patient tolerance, in order to prevent re-injury. [] Progressing: [x] Met: [] Not Met: [] Adjusted  2. Patient will have a decrease in pain to facilitate improvement in movement, function, and ADLs as indicated by Functional Deficits. [x] Progressing: [] Met: [] Not Met: [] Adjusted     Long Term Goals: To be achieved in: 6 weeks  1. FOTO score of at least 69 to assist with reaching prior level of function. NT THIS DATE  [] Progressing: [] Met: [] Not Met: [] Adjusted  2. Patient will demonstrate increased L shoulder flexion AROM to 170 deg to allow for proper joint functioning as indicated by patients Functional Deficits. [x] Progressing: [] Met: [] Not Met: [] Adjusted  3. Patient will demonstrate an increase in R shoulder Strength to 5 to allow for proper functional mobility as indicated by patients Functional Deficits. [x] Progressing: [] Met: [] Not Met: [] Adjusted  4. Patient will return to functional activities including reaching [place] without increased symptoms or restriction. [x] Progressing: [] Met: [] Not Met: [] Adjusted  5. Pt will be able to lift box OH without increased pain and demonstrate improve ergonomics. [x] Progressing: [] Met: [] Not Met: [] Adjusted     Overall Progression Towards Functional goals/ Treatment Progress Update:  [] Patient is progressing as expected towards functional goals listed. [] Progression is slowed due to complexities/Impairments listed. [x] Progression has been slowed due to co-morbidities.   [] Plan just implemented, too soon to assess goals progression <30days   [] Goals require adjustment due to lack of progress  [] Patient is not progressing as expected and requires additional follow up with physician  [] Other    Persisting Functional Limitations/Impairments:  [x]Sleeping []Sitting               []Standing []Transfers        []Walking []Kneeling               []Stairs []Squatting / bending   [x]ADLs [x]Reaching  [x]Lifting  [x]Housework  [x]Driving [x]Job related tasks  []Sports/Recreation []Other:        ASSESSMENT: due to nature of problem in her neck with more severe stenosis, patient is electing to try injections. She wants to hold on therapy for now until after the injections, so will keep chart open and perform re-assessment as needed should she want therapy following the injections.  Treatment/Activity Tolerance:  [x] Patient able to complete tx [] Patient limited by fatigue  [x] Patient limited by pain  [] Patient limited by other medical complications  [] Other:     Prognosis: [x] Good [] Fair  [] Poor    Patient Requires Follow-up: [x] Yes  [] No    Plan for next treatment session:continue manual for cspine, cervical stabs    PLAN: See keegan. PT 2x / week for 6 weeks.   [] Continue per plan of care [] Alter current plan (see comments)  [] Plan of care initiated [x] Hold pending MD visit [] Discharge    Electronically signed by: Tana Constantino, PT, DPT        Note: If patient does not return for scheduled/ recommended follow up visits, this note will serve as a discharge from care along with most recent update on progress.

## 2023-03-06 ENCOUNTER — TELEPHONE (OUTPATIENT)
Dept: PSYCHOLOGY | Age: 68
End: 2023-03-06

## 2023-03-10 ENCOUNTER — TELEPHONE (OUTPATIENT)
Dept: PSYCHOLOGY | Age: 68
End: 2023-03-10

## 2023-03-10 DIAGNOSIS — F33.1 MODERATE EPISODE OF RECURRENT MAJOR DEPRESSIVE DISORDER (HCC): Primary | ICD-10-CM

## 2023-03-13 NOTE — PROGRESS NOTES
[address]  3/18/23  Referring:     REASON FOR CONSULT/CHIEF COMPLAINT/HPI     Reason for visit/ Chief complaint   Dizziness     HPI Rivas Briones is a 78 y.o.  woman who was admitted in June 2021 for a suspected TIA. During this admission she was sent home with an event monitor to evaluate for atrial fibrillation, and also had an echo suspicious for a PFO and a JALIL confirming a PFO. She has had a good year. She feels a little suffocated with having her face mask with COVID. It takes her a little more effort to breathe. She is scared and a little more panicked since COVID started. She has dizziness from time to time but just a little bit. She doesn't really feel like doing much and feels a little depressed lately. She has been to the ER a few times with facial numbness and they have told her she needs to see a psychiatrist. She also has some tingling in her arm and cramps in her feet at times. Patient is adherent with medications and is tolerating them well without side effects     HISTORY/ALLERGIES/ROS     MedHx:  has a past medical history of Hypertension. Possible TIA in 2021  SurgHx:  has a past surgical history that includes Bunionectomy; Dilation and curettage of uterus (4/2/12); Dilation and curettage of uterus (03/09/2017); hysteroscopy; hysteroscopy (N/A, 08/16/2018); Colonoscopy (N/A, 9/6/2019); and Upper gastrointestinal endoscopy (N/A, 3/1/2021). SocHx:  reports that she has never smoked. She has never used smokeless tobacco. She reports that she does not drink alcohol and does not use drugs. Allergies: Patient has no known allergies. MEDICATIONS      Prior to Admission medications    Medication Sig Start Date End Date Taking?  Authorizing Provider   aspirin 81 MG chewable tablet 81 mg daily   Yes Historical Provider, MD       PHYSICAL EXAM        Vitals:    03/16/23 1545   BP: 104/68   Pulse: 57   SpO2: 96%      Weight: 175 lb 12.8 oz (79.7 kg)     Gen Alert, cooperative, no distress Heart  Regular rate and rhythm, no murmur   Head Normocephalic, atraumatic, no abnormalities Abd  Soft, NT, +BS, no mass, no OM   Eyes PERRLA, conj/corn clear Ext  Ext nl, AT, no C/C, no edema   Nose Nares normal, no drain age, Non-tender Pulse 2+ and symmetric   Throat Lips, mucosa, tongue normal Skin Color/text/turg nl, no rash/lesions   Neck S/S, TM, NT, no bruit Psych Nl mood and affect   Lung CTA-B, unlabored, no DTP     Ch wall NT, no deform       LABS and Imaging     Relevant and available CV data reviewed    EKG personally interpreted: normal sinus rhythm, within normal limits    Lab Results   Component Value Date/Time    CHOL 166 07/14/2022 07:15 AM    TRIG 61 07/14/2022 07:15 AM    HDL 57 07/14/2022 07:15 AM    LDLCALC 97 07/14/2022 07:15 AM    LABVLDL 12 07/14/2022 07:15 AM     Troponin   Date/Time Value Ref Range Status   12/09/2022 10:34 PM <0.01 <0.01 ng/mL Final     Comment:     Methodology by Troponin T   03/10/2022 03:40 PM <0.01 <0.01 ng/mL Final     Comment:     Methodology by Troponin T   06/21/2021 03:19 AM <0.01 <0.01 ng/mL Final     Comment:     Methodology by Troponin T       Myoview 8/7/20  Summary    Normal myocardial perfusion. Normal LV size and systolic function. Echo: Surface echo and JALIL were reviewed, these are essentially unremarkable except for a PFO    Event monitor: No atrial fibrillation noted, she had a few short runs of SVT, the fastest was 155 beats a minute, the longest was 14 seconds long    Outside/Care everywhere records Reviewed  Labs Reviewed  Prior Imaging, ekg, echo reviewed when available  Medications reviewed  Old Notes reviewed  ASSESSMENT AND PLAN     Encounter Diagnoses   Name Primary? Patent foramen ovale Yes    SVT (supraventricular tachycardia) (HCC)        Plan:  David Ramirez is doing well and does not have a cardiac problem. Her SVT episodes are very short, infrequent, and don't require treatment at this time.   Can follow with her PCP.    Patient counseled on lifestyle modification, diet, and exercise. Follow Up: as needed    Karin Cranker, MD  Cardiologist [facility]      Scribe's Attestation: This note was scribed in the presence of Dr. Kristian Hyman MD by Bipin Rodriguez RN. Physician Attestation  The scribe wrote this note in the presence of me Karin Cranker, MD). The scribe may have prepopulated components of this note with my historical  intellectual property under my direct supervision. Any additions to this intellectual property were performed in my presence and at my direction. Furthermore, the content and accuracy of this note have been reviewed by me with edits by me as needed.   Karin Cranker, MD 3/18/2023 12:03 PM

## 2023-03-16 ENCOUNTER — OFFICE VISIT (OUTPATIENT)
Dept: CARDIOLOGY CLINIC | Age: 68
End: 2023-03-16

## 2023-03-16 VITALS
OXYGEN SATURATION: 96 % | HEART RATE: 57 BPM | HEIGHT: 60 IN | WEIGHT: 175.8 LBS | BODY MASS INDEX: 34.51 KG/M2 | DIASTOLIC BLOOD PRESSURE: 68 MMHG | SYSTOLIC BLOOD PRESSURE: 104 MMHG

## 2023-03-16 DIAGNOSIS — I47.1 SVT (SUPRAVENTRICULAR TACHYCARDIA) (HCC): ICD-10-CM

## 2023-03-16 DIAGNOSIS — Q21.12 PATENT FORAMEN OVALE: Primary | ICD-10-CM

## 2023-03-16 NOTE — LETTER
[address] [place] [address] [address] [facility]  Dept: [phone]  Dept Fax: [phone]      2023    Patient: Dara Pugh  : 1955  DOS: 3/16/2023    To Whom it May Concern:     Dara Pugh was seen in my office today for an appointment. If you have any questions or concerns, please do not hesitate to call.     Sincerely,    Ray Ramesh MD

## 2023-04-06 ENCOUNTER — OFFICE VISIT (OUTPATIENT)
Dept: INTERNAL MEDICINE CLINIC | Age: 68
End: 2023-04-06
Payer: COMMERCIAL

## 2023-04-06 VITALS
WEIGHT: 182.2 LBS | OXYGEN SATURATION: 98 % | BODY MASS INDEX: 35.58 KG/M2 | HEART RATE: 63 BPM | SYSTOLIC BLOOD PRESSURE: 122 MMHG | DIASTOLIC BLOOD PRESSURE: 80 MMHG

## 2023-04-06 DIAGNOSIS — F33.1 MODERATE EPISODE OF RECURRENT MAJOR DEPRESSIVE DISORDER (HCC): Primary | ICD-10-CM

## 2023-04-06 DIAGNOSIS — M25.512 CHRONIC LEFT SHOULDER PAIN: ICD-10-CM

## 2023-04-06 DIAGNOSIS — M79.671 CHRONIC PAIN OF BOTH FEET: ICD-10-CM

## 2023-04-06 DIAGNOSIS — M79.672 CHRONIC PAIN OF BOTH FEET: ICD-10-CM

## 2023-04-06 DIAGNOSIS — G89.29 CHRONIC PAIN OF BOTH FEET: ICD-10-CM

## 2023-04-06 DIAGNOSIS — M20.61 TOE DEFORMITY, RIGHT: ICD-10-CM

## 2023-04-06 DIAGNOSIS — R41.3 MEMORY CHANGES: ICD-10-CM

## 2023-04-06 DIAGNOSIS — G89.29 CHRONIC LEFT SHOULDER PAIN: ICD-10-CM

## 2023-04-06 DIAGNOSIS — M20.62 TOE DEFORMITY, LEFT: ICD-10-CM

## 2023-04-06 PROCEDURE — 1036F TOBACCO NON-USER: CPT | Performed by: NURSE PRACTITIONER

## 2023-04-06 PROCEDURE — G8427 DOCREV CUR MEDS BY ELIG CLIN: HCPCS | Performed by: NURSE PRACTITIONER

## 2023-04-06 PROCEDURE — 99214 OFFICE O/P EST MOD 30 MIN: CPT | Performed by: NURSE PRACTITIONER

## 2023-04-06 PROCEDURE — G8400 PT W/DXA NO RESULTS DOC: HCPCS | Performed by: NURSE PRACTITIONER

## 2023-04-06 PROCEDURE — G8417 CALC BMI ABV UP PARAM F/U: HCPCS | Performed by: NURSE PRACTITIONER

## 2023-04-06 PROCEDURE — 1123F ACP DISCUSS/DSCN MKR DOCD: CPT | Performed by: NURSE PRACTITIONER

## 2023-04-06 PROCEDURE — 1090F PRES/ABSN URINE INCON ASSESS: CPT | Performed by: NURSE PRACTITIONER

## 2023-04-06 PROCEDURE — 3017F COLORECTAL CA SCREEN DOC REV: CPT | Performed by: NURSE PRACTITIONER

## 2023-04-06 RX ORDER — DULOXETIN HYDROCHLORIDE 30 MG/1
30 CAPSULE, DELAYED RELEASE ORAL DAILY
Qty: 30 CAPSULE | Refills: 3 | Status: SHIPPED | OUTPATIENT
Start: 2023-04-06

## 2023-04-07 ENCOUNTER — OFFICE VISIT (OUTPATIENT)
Dept: PSYCHOLOGY | Age: 68
End: 2023-04-07
Payer: COMMERCIAL

## 2023-04-07 DIAGNOSIS — F33.1 MODERATE EPISODE OF RECURRENT MAJOR DEPRESSIVE DISORDER (HCC): Primary | ICD-10-CM

## 2023-04-07 PROCEDURE — 1036F TOBACCO NON-USER: CPT | Performed by: PSYCHOLOGIST

## 2023-04-07 PROCEDURE — 90832 PSYTX W PT 30 MINUTES: CPT | Performed by: PSYCHOLOGIST

## 2023-04-07 PROCEDURE — 1123F ACP DISCUSS/DSCN MKR DOCD: CPT | Performed by: PSYCHOLOGIST

## 2023-04-07 NOTE — PROGRESS NOTES
Not on file   Social Connections: Not on file   Intimate Partner Violence: Not on file   Housing Stability: Unknown    Unable to Pay for Housing in the Last Year: Not on file    Number of [place] in the Last Year: Not on file    Unstable Housing in the Last Year: No     TOBACCO:   reports that she has never smoked. She has never used smokeless tobacco.  ETOH:   reports no history of alcohol use. Family History:   Family History   Problem Relation Age of Onset    Diabetes Sister      A:   PHQ-9 and CLAUDIA-7 not administered at this visit.      Diagnosis:    Major depressive disorder; recurrent and moderate        Diagnosis Date    Hypertension      Plan:  Pt interventions:  Established rapport and Conducted functional assessment    Pt Behavioral Change Plan:   See Pt Instructions

## 2023-04-17 ASSESSMENT — ENCOUNTER SYMPTOMS
BACK PAIN: 1
RESPIRATORY NEGATIVE: 1

## 2023-04-28 ENCOUNTER — OFFICE VISIT (OUTPATIENT)
Dept: PSYCHOLOGY | Age: 68
End: 2023-04-28
Payer: COMMERCIAL

## 2023-04-28 DIAGNOSIS — F33.1 MODERATE EPISODE OF RECURRENT MAJOR DEPRESSIVE DISORDER (HCC): Primary | ICD-10-CM

## 2023-04-28 PROCEDURE — 1123F ACP DISCUSS/DSCN MKR DOCD: CPT | Performed by: PSYCHOLOGIST

## 2023-04-28 PROCEDURE — 90832 PSYTX W PT 30 MINUTES: CPT | Performed by: PSYCHOLOGIST

## 2023-04-28 PROCEDURE — 1036F TOBACCO NON-USER: CPT | Performed by: PSYCHOLOGIST

## 2023-04-28 NOTE — PROGRESS NOTES
Behavioral Health Consultation  Talita Rodriguez M.A. Psychology Assistant  Guy Smith, Ph.D. Supervising Psychologist  4/28/2023  3:38 PM EDT      Time spent with Patient: 27 minutes  This is patient's fourth  [facility] appointment. Reason for Consult:  depression     Feedback given to PCP. S:  Pt seen for f/u of depression. Pt reported unchanged mood and sxs. Discussed Oriana assessment results and explained to pt Medical Certification for Disability Exception form. Pt scored in the 0.1 percentile for forward memory and 2nd percentile for backward memory. Pt verbalized understanding. O:  MSE:    Appearance: adequate hygiene  Attitude: cooperative and friendly  Consciousness: alert  Orientation: oriented to person, place, time, general circumstance  Memory:  impaired  Attention/Concentration: intact during session  Psychomotor Activity:normal  Eye Contact: normal  Speech: normal rate and volume, well-articulated  Mood: normal  Affect: normal  Perception: within normal limits  Thought Content: within normal limits  Thought Process: logical, coherent and goal-directed  Insight: good  Judgment: intact  Ability to understand instructions: Yes  Ability to respond meaningfully: Yes  Morbid Ideation: no   Suicide Assessment: no suicidal ideation, plan, or intent  Homicidal Ideation: no     History:    Medications:   Current Outpatient Medications   Medication Sig Dispense Refill    DULoxetine (CYMBALTA) 30 MG extended release capsule Take 1 capsule by mouth daily 30 capsule 3    aspirin 81 MG chewable tablet 1 tablet daily       No current facility-administered medications for this visit.      Social History:   Social History     Socioeconomic History    Marital status:      Spouse name: Not on file    Number of children: Not on file    Years of education: Not on file    Highest education level: Not on file   Occupational History    Not on file   Tobacco Use    Smoking status: Never    Smokeless tobacco:

## 2023-05-04 ENCOUNTER — OFFICE VISIT (OUTPATIENT)
Dept: INTERNAL MEDICINE CLINIC | Age: 68
End: 2023-05-04
Payer: COMMERCIAL

## 2023-05-04 VITALS
HEIGHT: 60 IN | DIASTOLIC BLOOD PRESSURE: 64 MMHG | HEART RATE: 71 BPM | SYSTOLIC BLOOD PRESSURE: 110 MMHG | BODY MASS INDEX: 35.34 KG/M2 | OXYGEN SATURATION: 96 % | WEIGHT: 180 LBS

## 2023-05-04 DIAGNOSIS — R41.3 MEMORY CHANGES: ICD-10-CM

## 2023-05-04 DIAGNOSIS — F33.1 MODERATE EPISODE OF RECURRENT MAJOR DEPRESSIVE DISORDER (HCC): ICD-10-CM

## 2023-05-04 DIAGNOSIS — E04.1 THYROID NODULE INCIDENTALLY NOTED ON IMAGING STUDY: ICD-10-CM

## 2023-05-04 DIAGNOSIS — M79.671 CHRONIC PAIN OF BOTH FEET: ICD-10-CM

## 2023-05-04 DIAGNOSIS — N32.81 OAB (OVERACTIVE BLADDER): Primary | ICD-10-CM

## 2023-05-04 DIAGNOSIS — M54.12 CERVICAL RADICULOPATHY: ICD-10-CM

## 2023-05-04 DIAGNOSIS — M25.512 CHRONIC LEFT SHOULDER PAIN: ICD-10-CM

## 2023-05-04 DIAGNOSIS — M79.672 CHRONIC PAIN OF BOTH FEET: ICD-10-CM

## 2023-05-04 DIAGNOSIS — G89.29 CHRONIC LEFT SHOULDER PAIN: ICD-10-CM

## 2023-05-04 DIAGNOSIS — M50.20 CERVICAL HERNIATION: ICD-10-CM

## 2023-05-04 DIAGNOSIS — G89.29 CHRONIC PAIN OF BOTH FEET: ICD-10-CM

## 2023-05-04 PROCEDURE — G8417 CALC BMI ABV UP PARAM F/U: HCPCS | Performed by: NURSE PRACTITIONER

## 2023-05-04 PROCEDURE — G8400 PT W/DXA NO RESULTS DOC: HCPCS | Performed by: NURSE PRACTITIONER

## 2023-05-04 PROCEDURE — 99214 OFFICE O/P EST MOD 30 MIN: CPT | Performed by: NURSE PRACTITIONER

## 2023-05-04 PROCEDURE — G8427 DOCREV CUR MEDS BY ELIG CLIN: HCPCS | Performed by: NURSE PRACTITIONER

## 2023-05-04 PROCEDURE — 1036F TOBACCO NON-USER: CPT | Performed by: NURSE PRACTITIONER

## 2023-05-04 PROCEDURE — 1090F PRES/ABSN URINE INCON ASSESS: CPT | Performed by: NURSE PRACTITIONER

## 2023-05-04 PROCEDURE — 1123F ACP DISCUSS/DSCN MKR DOCD: CPT | Performed by: NURSE PRACTITIONER

## 2023-05-04 PROCEDURE — 3017F COLORECTAL CA SCREEN DOC REV: CPT | Performed by: NURSE PRACTITIONER

## 2023-05-04 RX ORDER — OXYBUTYNIN CHLORIDE 5 MG/1
5 TABLET, EXTENDED RELEASE ORAL DAILY
Qty: 30 TABLET | Refills: 5 | Status: SHIPPED | OUTPATIENT
Start: 2023-05-04

## 2023-05-04 RX ORDER — DULOXETIN HYDROCHLORIDE 60 MG/1
60 CAPSULE, DELAYED RELEASE ORAL DAILY
Qty: 30 CAPSULE | Refills: 5 | Status: SHIPPED | OUTPATIENT
Start: 2023-05-04

## 2023-05-11 PROBLEM — M25.512 CHRONIC LEFT SHOULDER PAIN: Status: ACTIVE | Noted: 2023-05-11

## 2023-05-11 PROBLEM — F33.1 MODERATE EPISODE OF RECURRENT MAJOR DEPRESSIVE DISORDER (HCC): Status: ACTIVE | Noted: 2023-05-11

## 2023-05-11 PROBLEM — E04.1 THYROID NODULE INCIDENTALLY NOTED ON IMAGING STUDY: Status: ACTIVE | Noted: 2023-05-11

## 2023-05-11 PROBLEM — N32.81 OAB (OVERACTIVE BLADDER): Status: ACTIVE | Noted: 2023-05-11

## 2023-05-11 PROBLEM — M54.12 CERVICAL RADICULOPATHY: Status: ACTIVE | Noted: 2023-05-11

## 2023-05-11 PROBLEM — G89.29 CHRONIC LEFT SHOULDER PAIN: Status: ACTIVE | Noted: 2023-05-11

## 2023-05-11 ASSESSMENT — ENCOUNTER SYMPTOMS
BACK PAIN: 1
RESPIRATORY NEGATIVE: 1

## 2023-05-11 NOTE — PROGRESS NOTES
SUBJECTIVE:    Patient ID: Dejuan Huertas is a 78 y.o. female. CC: Insomnia, memory concerns, urinary concerns, depression, neck pain    HPI: The patient presents to the office today for both ongoing and new concerns. Since establishing care 6 months ago, the patient has been in the office for 5 visits with various concerns. She is seen with a  present. Past concerns have included: Constipation, arm pain, skin concerns, shoulder pain, depression, foot pain, insomnia, poor memory, back pain, toe deformity. Today, her main concern is urinary frequency. This has been present for years. She denies any fever or chills. She denies any hematuria. She has been seen by psychology for depression. Been seen by orthopedics for cervical radiculopathy and bilateral arm pain. She has completed physical therapy. She continues have difficulty falling and staying asleep. Patient continues to suffer with aches and pains, back pain. She previously reported shoulder pain. Previous complaints have included mid back pain, lung pain, leg pain, foot pain, shoulder pain, cold feet, depression. She has a history of toe deformity. She was previously given a referral to podiatry. She has been using Tylenol for various aches and pains. She was started on Cymbalta for mood which has helped. We discussed that this can also help with chronic pain. Past Medical History:   Diagnosis Date    Hypertension         Current Outpatient Medications   Medication Sig Dispense Refill    oxybutynin (DITROPAN XL) 5 MG extended release tablet Take 1 tablet by mouth daily 30 tablet 5    DULoxetine (CYMBALTA) 60 MG extended release capsule Take 1 capsule by mouth daily 30 capsule 5    aspirin 81 MG chewable tablet 1 tablet daily       No current facility-administered medications for this visit. Review of Systems   Constitutional:  Positive for fatigue. Negative for chills and fever.

## 2023-05-15 ENCOUNTER — OFFICE VISIT (OUTPATIENT)
Dept: PSYCHOLOGY | Age: 68
End: 2023-05-15
Payer: COMMERCIAL

## 2023-05-15 DIAGNOSIS — F33.1 MODERATE EPISODE OF RECURRENT MAJOR DEPRESSIVE DISORDER (HCC): Primary | ICD-10-CM

## 2023-05-15 PROCEDURE — 1123F ACP DISCUSS/DSCN MKR DOCD: CPT | Performed by: PSYCHOLOGIST

## 2023-05-15 PROCEDURE — 1036F TOBACCO NON-USER: CPT | Performed by: PSYCHOLOGIST

## 2023-05-15 PROCEDURE — 90832 PSYTX W PT 30 MINUTES: CPT | Performed by: PSYCHOLOGIST

## 2023-05-15 NOTE — PROGRESS NOTES
Behavioral Health Consultation  Elijah Becker M.A. Psychology Assistant  Lee Ruiz, Ph.D. Supervising Psychologist  5/15/2023  8:21 AM EDT      Time spent with Patient: 27 minutes   This is patient's fifth  [facility] appointment. Reason for Consult:  depression    Feedback given to PCP. S:  Pt seen for f/u of depression. Pt reports improved mood and sxs. She states she has been feeling sad the past few days although is unsure why. Pt noted when she is sad she spends time working or completing tasks at home. Otherwise, she passes the time on her phone or watching television. She noted that this is not necessarily enjoyable for her. Reviewed the cycle of depression. Discussed and set plan for behavioral activation. Pt noted she is anxious regarding food. She states that she feels hungry all of the time and is eating more and always looking for something to eat. She desires to decrease the amount that she is eating.      O:  MSE:    Appearance: adequate hygiene  Attitude: cooperative and friendly  Consciousness: alert  Orientation: oriented to person, place, time, general circumstance  Memory:  reports impairment  Attention/Concentration: variable  Psychomotor Activity:normal  Eye Contact: normal  Speech: normal rate and volume, well-articulated  Mood: normal  Affect: dysphoric  Perception: within normal limits  Thought Content: within normal limits  Thought Process: logical, coherent  Insight: good and fair  Judgment: intact  Ability to understand instructions: Yes  Ability to respond meaningfully: Yes  Morbid Ideation: no   Suicide Assessment: no suicidal ideation, plan, or intent  Homicidal Ideation: no     History:    Medications:   Current Outpatient Medications   Medication Sig Dispense Refill    oxybutynin (DITROPAN XL) 5 MG extended release tablet Take 1 tablet by mouth daily 30 tablet 5    DULoxetine (CYMBALTA) 60 MG extended release capsule Take 1 capsule by mouth daily 30 capsule 5    aspirin 81

## 2023-05-24 ENCOUNTER — HOSPITAL ENCOUNTER (OUTPATIENT)
Dept: ULTRASOUND IMAGING | Age: 68
Discharge: HOME | End: 2023-05-24
Payer: COMMERCIAL

## 2023-05-24 DIAGNOSIS — E04.1 THYROID NODULE INCIDENTALLY NOTED ON IMAGING STUDY: ICD-10-CM

## 2023-05-24 PROCEDURE — 76536 US EXAM OF HEAD AND NECK: CPT

## 2023-05-24 NOTE — CONSULTS
Session ID: [id number]  Request ID: [id number]  Language: Kiswahili  Status: Fulfilled   ID: #[id number]   Name: Srini Gomez

## 2023-06-01 ENCOUNTER — OFFICE VISIT (OUTPATIENT)
Dept: ORTHOPEDIC SURGERY | Age: 68
End: 2023-06-01
Payer: COMMERCIAL

## 2023-06-01 VITALS — WEIGHT: 181 LBS | HEIGHT: 60 IN | RESPIRATION RATE: 16 BRPM | BODY MASS INDEX: 35.53 KG/M2

## 2023-06-01 DIAGNOSIS — M54.12 CERVICAL RADICULOPATHY: Primary | ICD-10-CM

## 2023-06-01 DIAGNOSIS — M48.02 FORAMINAL STENOSIS OF CERVICAL REGION: ICD-10-CM

## 2023-06-01 PROCEDURE — G8400 PT W/DXA NO RESULTS DOC: HCPCS | Performed by: STUDENT IN AN ORGANIZED HEALTH CARE EDUCATION/TRAINING PROGRAM

## 2023-06-01 PROCEDURE — 3017F COLORECTAL CA SCREEN DOC REV: CPT | Performed by: STUDENT IN AN ORGANIZED HEALTH CARE EDUCATION/TRAINING PROGRAM

## 2023-06-01 PROCEDURE — 1036F TOBACCO NON-USER: CPT | Performed by: STUDENT IN AN ORGANIZED HEALTH CARE EDUCATION/TRAINING PROGRAM

## 2023-06-01 PROCEDURE — G8427 DOCREV CUR MEDS BY ELIG CLIN: HCPCS | Performed by: STUDENT IN AN ORGANIZED HEALTH CARE EDUCATION/TRAINING PROGRAM

## 2023-06-01 PROCEDURE — G8417 CALC BMI ABV UP PARAM F/U: HCPCS | Performed by: STUDENT IN AN ORGANIZED HEALTH CARE EDUCATION/TRAINING PROGRAM

## 2023-06-01 PROCEDURE — 1090F PRES/ABSN URINE INCON ASSESS: CPT | Performed by: STUDENT IN AN ORGANIZED HEALTH CARE EDUCATION/TRAINING PROGRAM

## 2023-06-01 PROCEDURE — 1123F ACP DISCUSS/DSCN MKR DOCD: CPT | Performed by: STUDENT IN AN ORGANIZED HEALTH CARE EDUCATION/TRAINING PROGRAM

## 2023-06-01 PROCEDURE — 99213 OFFICE O/P EST LOW 20 MIN: CPT | Performed by: STUDENT IN AN ORGANIZED HEALTH CARE EDUCATION/TRAINING PROGRAM

## 2023-06-01 NOTE — PROGRESS NOTES
(1.524 m), weight 181 lb (82.1 kg), not currently breastfeeding. GENERAL EXAM:  General Apparence: Patient is adequately groomed with no evidence of malnutrition. Orientation: The patient is oriented to time, place and person. Mood & Affect:The patient's mood and affect are appropriate. Vascular: Examination reveals no swelling tenderness in upper or lower extremities. Good capillary refill. Lymphatic: The lymphatic examination bilaterally reveals all areas to be without enlargement or induration  Sensation: Sensation is intact without deficit  Coordination/Balance: Good coordination     CERVICAL EXAMINATION:  Inspection: Local inspection shows no step-off or bruising. Cervical alignment is normal.     Palpation: No evidence of tenderness at the midline, and trapezius. Paraspinal tenderness is not present. There is no step-off or paraspinal spasm. Range of Motion: Range of Motion:  limited by 25% in all planes due to pain   Strength: 5/5 bilateral upper extremities   Special Tests:      Spurling's +left, L'Hermitte's & Mata's negative bilaterally. Horowitz and Impingement tests are negative bilaterally. Cubital and Carpal tunnel Tinel's negative bilaterally. Skin:There are no rashes, ulcerations or lesions in right & left upper extremities. Reflexes: Bilaterally triceps, biceps and brachioradialis are 2+. Clonus absent bilaterally at the feet. Additional Examinations:       RIGHT UPPER EXTREMITY:  Inspection/examination of the right upper extremity does not show any tenderness, deformity or injury. Range of motion is full. There is no gross instability. There are no rashes, ulcerations or lesions. Strength and tone are normal.  LEFT UPPER EXTREMITY: Inspection/examination of the left upper extremity does not show any tenderness, deformity or injury. Range of motion is full. There is no gross instability. There are no rashes, ulcerations or lesions.  Strength and tone are

## 2023-06-09 ENCOUNTER — OFFICE VISIT (OUTPATIENT)
Dept: PSYCHOLOGY | Age: 68
End: 2023-06-09
Payer: COMMERCIAL

## 2023-06-09 DIAGNOSIS — F33.1 MODERATE EPISODE OF RECURRENT MAJOR DEPRESSIVE DISORDER (HCC): Primary | ICD-10-CM

## 2023-06-09 PROCEDURE — 90832 PSYTX W PT 30 MINUTES: CPT | Performed by: PSYCHOLOGIST

## 2023-06-09 PROCEDURE — 1123F ACP DISCUSS/DSCN MKR DOCD: CPT | Performed by: PSYCHOLOGIST

## 2023-06-09 PROCEDURE — 1036F TOBACCO NON-USER: CPT | Performed by: PSYCHOLOGIST

## 2023-06-09 NOTE — PROGRESS NOTES
History:    Medications:   Current Outpatient Medications   Medication Sig Dispense Refill    oxybutynin (DITROPAN XL) 5 MG extended release tablet Take 1 tablet by mouth daily 30 tablet 5    DULoxetine (CYMBALTA) 60 MG extended release capsule Take 1 capsule by mouth daily 30 capsule 5    aspirin 81 MG chewable tablet 1 tablet daily       No current facility-administered medications for this visit. Social History:   Social History     Socioeconomic History    Marital status:      Spouse name: Not on file    Number of children: Not on file    Years of education: Not on file    Highest education level: Not on file   Occupational History    Not on file   Tobacco Use    Smoking status: Never    Smokeless tobacco: Never   Vaping Use    Vaping Use: Never used   Substance and Sexual Activity    Alcohol use: No    Drug use: No     Frequency: 3.0 times per week    Sexual activity: Not on file   Other Topics Concern    Not on file   Social History Narrative    Not on file     Social Determinants of Health     Financial Resource Strain: Medium Risk    Difficulty of Paying Living Expenses: Somewhat hard   Food Insecurity: No Food Insecurity    Worried About Running Out of Food in the Last Year: Never true    Ran Out of Food in the Last Year: Never true   Transportation Needs: Unknown    Lack of Transportation (Medical): Not on file    Lack of Transportation (Non-Medical): No   Physical Activity: Not on file   Stress: Not on file   Social Connections: Not on file   Intimate Partner Violence: Not on file   Housing Stability: Unknown    Unable to Pay for Housing in the Last Year: Not on file    Number of Places Lived in the Last Year: Not on file    Unstable Housing in the Last Year: No     TOBACCO:   reports that she has never smoked. She has never used smokeless tobacco.  ETOH:   reports no history of alcohol use.   Family History:   Family History   Problem Relation Age of Onset    Diabetes Sister      A:  PHQ-9

## 2023-06-30 ENCOUNTER — OFFICE VISIT (OUTPATIENT)
Dept: PSYCHOLOGY | Age: 68
End: 2023-06-30
Payer: COMMERCIAL

## 2023-06-30 DIAGNOSIS — F33.1 MODERATE EPISODE OF RECURRENT MAJOR DEPRESSIVE DISORDER (HCC): Primary | ICD-10-CM

## 2023-06-30 PROCEDURE — 1036F TOBACCO NON-USER: CPT | Performed by: PSYCHOLOGIST

## 2023-06-30 PROCEDURE — 1123F ACP DISCUSS/DSCN MKR DOCD: CPT | Performed by: PSYCHOLOGIST

## 2023-06-30 PROCEDURE — 90832 PSYTX W PT 30 MINUTES: CPT | Performed by: PSYCHOLOGIST

## 2023-07-18 ENCOUNTER — OFFICE VISIT (OUTPATIENT)
Dept: INTERNAL MEDICINE CLINIC | Age: 68
End: 2023-07-18
Payer: COMMERCIAL

## 2023-07-18 VITALS
BODY MASS INDEX: 35.94 KG/M2 | SYSTOLIC BLOOD PRESSURE: 102 MMHG | OXYGEN SATURATION: 98 % | WEIGHT: 184 LBS | DIASTOLIC BLOOD PRESSURE: 64 MMHG | HEART RATE: 68 BPM

## 2023-07-18 DIAGNOSIS — M79.10 MUSCULAR ACHES: ICD-10-CM

## 2023-07-18 DIAGNOSIS — M54.12 CERVICAL RADICULOPATHY: ICD-10-CM

## 2023-07-18 DIAGNOSIS — M79.10 MUSCULAR ACHES: Primary | ICD-10-CM

## 2023-07-18 LAB
ANION GAP SERPL CALCULATED.3IONS-SCNC: 7 MMOL/L (ref 3–16)
BUN SERPL-MCNC: 14 MG/DL (ref 7–20)
CALCIUM SERPL-MCNC: 9.7 MG/DL (ref 8.3–10.6)
CHLORIDE SERPL-SCNC: 106 MMOL/L (ref 99–110)
CO2 SERPL-SCNC: 30 MMOL/L (ref 21–32)
CREAT SERPL-MCNC: 0.9 MG/DL (ref 0.6–1.2)
GFR SERPLBLD CREATININE-BSD FMLA CKD-EPI: >60 ML/MIN/{1.73_M2}
GLUCOSE SERPL-MCNC: 92 MG/DL (ref 70–99)
POTASSIUM SERPL-SCNC: 5.1 MMOL/L (ref 3.5–5.1)
SODIUM SERPL-SCNC: 143 MMOL/L (ref 136–145)

## 2023-07-18 PROCEDURE — 99213 OFFICE O/P EST LOW 20 MIN: CPT

## 2023-07-18 PROCEDURE — 1123F ACP DISCUSS/DSCN MKR DOCD: CPT

## 2023-07-18 RX ORDER — OXYBUTYNIN CHLORIDE 5 MG/1
5 TABLET, EXTENDED RELEASE ORAL DAILY
Qty: 30 TABLET | Refills: 5 | Status: CANCELLED | OUTPATIENT
Start: 2023-07-18

## 2023-07-18 RX ORDER — TIZANIDINE 2 MG/1
2 TABLET ORAL NIGHTLY PRN
Qty: 30 TABLET | Refills: 0 | Status: SHIPPED | OUTPATIENT
Start: 2023-07-18

## 2023-07-18 ASSESSMENT — ENCOUNTER SYMPTOMS
NAUSEA: 0
COLOR CHANGE: 0
CHEST TIGHTNESS: 0
WHEEZING: 0
SHORTNESS OF BREATH: 0
COUGH: 0
BLOOD IN STOOL: 0
BACK PAIN: 1
VOMITING: 0
ABDOMINAL PAIN: 0

## 2023-07-18 NOTE — ASSESSMENT & PLAN NOTE
She was encouraged to follow-up with new referral to Dr. Brent Skaggs for epidural injection and then follow-up with Ortho.

## 2023-07-18 NOTE — PROGRESS NOTES
Yesy Perkins (:  1955) is a 78 y.o. female,Established patient, here for evaluation of the following chief complaint(s):  Pain (Leg/hip pain /Cramps in legs/Legs feel very heavy, )    ASSESSMENT/PLAN:  1. Muscular aches  Assessment & Plan:  Check electrolytes to ensure no abnormalities causing symptoms. Tizanidine ordered for as needed to help with muscle spasms. No edema, erythema, bruising noted. Orders:  -     Basic Metabolic Panel; Future  -     tiZANidine (ZANAFLEX) 2 MG tablet; Take 1 tablet by mouth nightly as needed (muscle spasms), Disp-30 tablet, R-0Normal  2. Cervical radiculopathy  Assessment & Plan:  She was encouraged to follow-up with new referral to Dr. Corwin White for epidural injection and then follow-up with Ortho. Return for patient has multiple concerns today, she needs a follow-up appoinment with Feliciano Mead to cover them. SUBJECTIVE/OBJECTIVE:  HPI    52-year-old Ukrainian-speaking female patient in for acute visit today.  line used for entire visit. It was somewhat difficult to get thorough HPI due to language barrier. Patient states that \"this all happened a long time ago and has been bothering me for a long time\", pointing to her entire back, squeezing her arms, and squeezing down both legs. Reports for the last week she's had increased right upper leg pain that feels like muscle aches. Then reports that both of her legs have been painful. States that she's found it helps at times if she gets up and walks around. States she has taken tylenol. Reviewing her chart I've noted:   She has completed physical therapy. She's had a MRI of cervical spine (C6-7: Moderate bilateral foraminal stenosis, disc degeneration) and Xray of right hip (noted osteoarthritic changes of hip, arthropathy of lower lumbar spine).    She has been seen at [facility] by Nhi COLE, on multiple occasions but per her notes the patient hasn't complained of lower extremity

## 2023-07-18 NOTE — ASSESSMENT & PLAN NOTE
Check electrolytes to ensure no abnormalities causing symptoms. Tizanidine ordered for as needed to help with muscle spasms. No edema, erythema, bruising noted.

## 2023-08-15 ENCOUNTER — HOSPITAL ENCOUNTER (EMERGENCY)
Age: 68
Discharge: HOME | End: 2023-08-15
Payer: COMMERCIAL

## 2023-08-15 VITALS
HEART RATE: 65 BPM | BODY MASS INDEX: 35.54 KG/M2 | OXYGEN SATURATION: 98 % | TEMPERATURE: 97.8 F | RESPIRATION RATE: 18 BRPM | WEIGHT: 182 LBS | SYSTOLIC BLOOD PRESSURE: 134 MMHG | DIASTOLIC BLOOD PRESSURE: 82 MMHG

## 2023-08-15 DIAGNOSIS — G89.29 CHRONIC NECK PAIN: Primary | ICD-10-CM

## 2023-08-15 DIAGNOSIS — M54.2 CHRONIC NECK PAIN: Primary | ICD-10-CM

## 2023-08-15 PROCEDURE — 6370000000 HC RX 637 (ALT 250 FOR IP): Performed by: PHYSICIAN ASSISTANT

## 2023-08-15 PROCEDURE — 99283 EMERGENCY DEPT VISIT LOW MDM: CPT

## 2023-08-15 RX ORDER — LIDOCAINE 4 G/G
1 PATCH TOPICAL DAILY
Qty: 30 PATCH | Refills: 0 | Status: SHIPPED | OUTPATIENT
Start: 2023-08-15 | End: 2023-09-14

## 2023-08-15 RX ORDER — ACETAMINOPHEN 500 MG
500 TABLET ORAL EVERY 6 HOURS PRN
COMMUNITY

## 2023-08-15 RX ORDER — LIDOCAINE 4 G/G
1 PATCH TOPICAL ONCE
Status: DISCONTINUED | OUTPATIENT
Start: 2023-08-15 | End: 2023-08-15 | Stop reason: HOSPADM

## 2023-08-15 RX ORDER — NAPROXEN 500 MG/1
500 TABLET ORAL 2 TIMES DAILY
Qty: 20 TABLET | Refills: 0 | Status: SHIPPED | OUTPATIENT
Start: 2023-08-15 | End: 2023-08-25

## 2023-08-15 RX ORDER — CYCLOBENZAPRINE HCL 10 MG
10 TABLET ORAL 3 TIMES DAILY PRN
Qty: 20 TABLET | Refills: 0 | Status: SHIPPED | OUTPATIENT
Start: 2023-08-15

## 2023-08-15 ASSESSMENT — ENCOUNTER SYMPTOMS
BACK PAIN: 0
ABDOMINAL PAIN: 0
SHORTNESS OF BREATH: 0
VOMITING: 0
COLOR CHANGE: 0
NAUSEA: 0

## 2023-08-15 ASSESSMENT — PAIN - FUNCTIONAL ASSESSMENT: PAIN_FUNCTIONAL_ASSESSMENT: 0-10

## 2023-08-15 ASSESSMENT — LIFESTYLE VARIABLES: HOW OFTEN DO YOU HAVE A DRINK CONTAINING ALCOHOL: NEVER

## 2023-08-15 NOTE — ED PROVIDER NOTES
[facility]        Pt Name: Dwight Daugherty  MRN: [id number]  [address] 1955  Date of evaluation: 8/15/2023  Provider: Cassius Dunham PA-C  PCP: DUANE Godoy CNP  Note Started: 2:31 PM EDT 8/15/23      ISRRAEL. I have evaluated this patient. CHIEF COMPLAINT       Chief Complaint   Patient presents with    Neck Pain     Arrived per family d/t neck pain that additionally affects left leg; went home from work a couple of days ago d/t the pain; described pain as pinched nerves in the neck       HISTORY OF PRESENT ILLNESS: 1 or more Elements     History from : Patient    Limitations to history : Language Salvadorean [id number]    Dwight Daugherty is a 78 y.o. female who presents to the emergency department complaining of right-sided neck pain. She has had neck pain like this in the past.  This has been going on for several years. It worsened over the past few weeks. She sits at her computer for long periods of time and feels a lot of tension in her neck. She did mention that she does have history of sciatica in the right lower extremity but denies any acute back or leg pain currently. She is here in the emergency department with primary chief complaint of neck pain. She denies any new injury, headache, dizziness lightheadedness, chest pain, shortness of breath, numbness, tingling or weakness. She walked into the emergency department with steady gait. She took Tylenol this morning. Nursing Notes were all reviewed and agreed with or any disagreements were addressed in the HPI. REVIEW OF SYSTEMS :      Review of Systems   Constitutional:  Negative for chills and fever. Eyes:  Negative for visual disturbance. Respiratory:  Negative for shortness of breath. Cardiovascular:  Negative for chest pain. Gastrointestinal:  Negative for abdominal pain, nausea and vomiting. Genitourinary: Negative.     Musculoskeletal:

## 2023-08-21 ENCOUNTER — OFFICE VISIT (OUTPATIENT)
Dept: INTERNAL MEDICINE CLINIC | Age: 68
End: 2023-08-21
Payer: COMMERCIAL

## 2023-08-21 VITALS — HEIGHT: 60 IN | HEART RATE: 77 BPM | WEIGHT: 181 LBS | OXYGEN SATURATION: 99 % | BODY MASS INDEX: 35.53 KG/M2

## 2023-08-21 DIAGNOSIS — Z78.0 POSTMENOPAUSAL: ICD-10-CM

## 2023-08-21 DIAGNOSIS — M54.12 CERVICAL RADICULOPATHY: ICD-10-CM

## 2023-08-21 DIAGNOSIS — F33.1 MODERATE EPISODE OF RECURRENT MAJOR DEPRESSIVE DISORDER (HCC): ICD-10-CM

## 2023-08-21 DIAGNOSIS — Z00.00 ANNUAL PHYSICAL EXAM: Primary | ICD-10-CM

## 2023-08-21 DIAGNOSIS — M25.512 CHRONIC LEFT SHOULDER PAIN: ICD-10-CM

## 2023-08-21 DIAGNOSIS — R60.9 DEPENDENT EDEMA: ICD-10-CM

## 2023-08-21 DIAGNOSIS — Z78.9 LANGUAGE BARRIER: ICD-10-CM

## 2023-08-21 DIAGNOSIS — M50.20 CERVICAL HERNIATION: ICD-10-CM

## 2023-08-21 DIAGNOSIS — G89.29 CHRONIC LEFT SHOULDER PAIN: ICD-10-CM

## 2023-08-21 PROCEDURE — 99397 PER PM REEVAL EST PAT 65+ YR: CPT | Performed by: NURSE PRACTITIONER

## 2023-08-21 RX ORDER — CYCLOBENZAPRINE HCL 10 MG
10 TABLET ORAL 3 TIMES DAILY PRN
Qty: 30 TABLET | Refills: 3 | Status: SHIPPED | OUTPATIENT
Start: 2023-08-21

## 2023-08-21 RX ORDER — DULOXETIN HYDROCHLORIDE 60 MG/1
60 CAPSULE, DELAYED RELEASE ORAL DAILY
Qty: 30 CAPSULE | Refills: 5 | Status: SHIPPED | OUTPATIENT
Start: 2023-08-21

## 2023-08-21 RX ORDER — NAPROXEN 500 MG/1
500 TABLET ORAL 2 TIMES DAILY PRN
Qty: 30 TABLET | Refills: 3 | Status: SHIPPED | OUTPATIENT
Start: 2023-08-21

## 2023-08-24 ENCOUNTER — HOSPITAL ENCOUNTER (OUTPATIENT)
Dept: GENERAL RADIOLOGY | Age: 68
Discharge: HOME | End: 2023-08-24
Payer: COMMERCIAL

## 2023-08-24 DIAGNOSIS — Z78.0 POSTMENOPAUSAL: ICD-10-CM

## 2023-08-24 PROCEDURE — 77080 DXA BONE DENSITY AXIAL: CPT

## 2023-08-30 ASSESSMENT — ENCOUNTER SYMPTOMS
NAUSEA: 0
DIARRHEA: 0
VOMITING: 0
BACK PAIN: 1
ABDOMINAL PAIN: 1
RESPIRATORY NEGATIVE: 1

## 2023-09-28 ENCOUNTER — OFFICE VISIT (OUTPATIENT)
Dept: PSYCHOLOGY | Age: 68
End: 2023-09-28
Payer: COMMERCIAL

## 2023-09-28 DIAGNOSIS — F33.1 MODERATE EPISODE OF RECURRENT MAJOR DEPRESSIVE DISORDER (HCC): Primary | ICD-10-CM

## 2023-09-28 PROCEDURE — 90832 PSYTX W PT 30 MINUTES: CPT | Performed by: PSYCHOLOGIST

## 2023-09-28 PROCEDURE — 1123F ACP DISCUSS/DSCN MKR DOCD: CPT | Performed by: PSYCHOLOGIST

## 2023-09-28 PROCEDURE — 1036F TOBACCO NON-USER: CPT | Performed by: PSYCHOLOGIST

## 2023-09-28 ASSESSMENT — PATIENT HEALTH QUESTIONNAIRE - PHQ9
4. FEELING TIRED OR HAVING LITTLE ENERGY: 0
5. POOR APPETITE OR OVEREATING: 1
SUM OF ALL RESPONSES TO PHQ QUESTIONS 1-9: 3
7. TROUBLE CONCENTRATING ON THINGS, SUCH AS READING THE NEWSPAPER OR WATCHING TELEVISION: 1
1. LITTLE INTEREST OR PLEASURE IN DOING THINGS: 0
3. TROUBLE FALLING OR STAYING ASLEEP: 0
9. THOUGHTS THAT YOU WOULD BE BETTER OFF DEAD, OR OF HURTING YOURSELF: 0
8. MOVING OR SPEAKING SO SLOWLY THAT OTHER PEOPLE COULD HAVE NOTICED. OR THE OPPOSITE, BEING SO FIGETY OR RESTLESS THAT YOU HAVE BEEN MOVING AROUND A LOT MORE THAN USUAL: 0
10. IF YOU CHECKED OFF ANY PROBLEMS, HOW DIFFICULT HAVE THESE PROBLEMS MADE IT FOR YOU TO DO YOUR WORK, TAKE CARE OF THINGS AT HOME, OR GET ALONG WITH OTHER PEOPLE: 0
6. FEELING BAD ABOUT YOURSELF - OR THAT YOU ARE A FAILURE OR HAVE LET YOURSELF OR YOUR FAMILY DOWN: 1
SUM OF ALL RESPONSES TO PHQ QUESTIONS 1-9: 3

## 2023-09-28 NOTE — PROGRESS NOTES
on file   Other Topics Concern    Not on file   Social History Narrative    Not on file     Social Determinants of Health     Financial Resource Strain: Medium Risk (2/2/2023)    Overall Financial Resource Strain (CARDIA)     Difficulty of Paying Living Expenses: Somewhat hard   Food Insecurity: No Food Insecurity (2/2/2023)    Hunger Vital Sign     Worried About Running Out of Food in the Last Year: Never true     Ran Out of Food in the Last Year: Never true   Transportation Needs: Unknown (2/2/2023)    PRAPARE - Transportation     Lack of Transportation (Medical): Not on file     Lack of Transportation (Non-Medical): No   Physical Activity: Not on file   Stress: Not on file   Social Connections: Not on file   Intimate Partner Violence: Not on file   Housing Stability: Unknown (2/2/2023)    Housing Stability Vital Sign     Unable to Pay for Housing in the Last Year: Not on file     Number of Places Lived in the Last Year: Not on file     Unstable Housing in the Last Year: No     TOBACCO:   reports that she has never smoked. She has never used smokeless tobacco.  ETOH:   reports no history of alcohol use. Family History:   Family History   Problem Relation Age of Onset    Diabetes Sister        A:        1/9/2023     3:58 PM 11/15/2022     4:17 PM   PHQ Scores   PHQ2 Score 1 2   PHQ9 Score 8 2     Interpretation of Total Score Depression Severity: 1-4 = Minimal depression, 5-9 = Mild depression, 10-14 = Moderate depression, 15-19 = Moderately severe depression, 20-27 = Severe depression         No data to display              Interpretation of CLAUDIA-7 score: 5-9 = mild anxiety, 10-14 = moderate anxiety, 15+ = severe anxiety. Recommend referral to behavioral health for scores 10 or greater.     Diagnosis:  Major depressive disorder; recurrent and moderate        Diagnosis Date    Hypertension        Plan:  Pt interventions:  Established rapport, Conducted functional assessment, [place]-setting to identify pt's primary

## 2023-09-28 NOTE — PATIENT INSTRUCTIONS
Healthy Coping Strategies    __Art    __Gardening    __Meditation    __Reading    __Therapy    __Dance    __Hobbies    __Movies    __Sports    __Walking    __Exercise    __Listening to music    __Playing an instrument    __Talking    __Writing

## 2024-01-10 NOTE — PROGRESS NOTES
Name_______________________________________Printed:____________________  Date and time of surgery__1/17/24  0730______________________Arrival Time:___0600  [facility] [place], [place]_____________   1. The instructions given regarding when and if a patient needs to stop oral intake prior to surgery varies.Follow the specific instructions you were given                  XXXX___Nothing to eat or to drink after Midnight the night before.                   ____Carbo loading or instructions will be given to select patients-if you have been given those instructions -please do the following                           The evening before your surgery after dinner before midnight drink 40 ounces of gatorade.If you are diabetic use sugar free.  The morning of surgery drink 40 ounces of water.This needs to be finished 3 hours prior to your surgery start time.    2. Take the following pills with a small sip of water on the morning of surgery___________none________________________________________                  Do not take blood pressure medications ending in pril or sartan the [place] prior to surgery or the morning of surgery. Dr Helms's patient are not to take any medications the AM of surgery.         3. Aspirin, Ibuprofen, Advil, Naproxen, Vitamin E and other Anti-inflammatory products and supplements should be stopped for 5 -7days before surgery or as directed by your physician.   4. Check with your Doctor regarding stopping Plavix, Coumadin,Eliquis, Lovenox,Effient,Pradaxa,Xarelto, Fragmin or other blood thinners and follow their instructions.   5. Do not smoke, and do not drink any alcoholic beverages 24 hours prior to surgery.  This includes NA Beer.Refrain from the usage of any recreational drugs.   6. You may brush your teeth and gargle the morning of surgery.  DO NOT SWALLOW WATER   7. You MUST make arrangements for a responsible adult to stay on site while you are here and take you home after your surgery. You will not be allowed to leave  picture ID and insurance card.             19.  Visit our web site for additional information:  [email or web address]              20.During flu season no children under the age of 14 are permitted in the hospital for the safety of all patients.                              21. If you take a long acting insulin in the evening only  take half of your usual  dose the night  before your procedure              22. If you use a c-pap please bring DOS if staying overnight,             23.For your convenience [facility] has a pharmacy on site to fill your prescriptions.             24. If you use oxygen and have a portable tank please bring it  with you the DOS             25. Bring a complete list of all your medications with name and dose include any supplements.             26. Other__________________________________________   *Please call pre admission testing if you any further questions   [place]         [phone]   [place] [phone]   [place]            [phone]    [facility]  [phone]   [facility]   [phone]       VISITOR POLICY(subject to change)    Current policy is 2 visitors per patient. No children. Mask is  at the discretion of the facility. Visiting hours are 8a-8p. Overnight visitors will be at the discretion of the nurse. All policies subject to change.      All above information reviewed with patient in person or by phone.Patient verbalizes understanding.All questions and concerns addressed.                                                                                                 Patient/Rep____________________                                                                                                                                      ARRANGED    Company __[employer]____    Language __Spanish____    Date __1/17/24_______    Arrival Time _0600_____    Length of time __3.5 hours____    [place] [place]    Confirmation Number _[id number]_____

## 2024-01-17 ENCOUNTER — ANESTHESIA EVENT (OUTPATIENT)
Dept: OPERATING ROOM | Age: 69
End: 2024-01-17
Payer: COMMERCIAL

## 2024-01-17 ENCOUNTER — ANESTHESIA (OUTPATIENT)
Dept: OPERATING ROOM | Age: 69
End: 2024-01-17
Payer: COMMERCIAL

## 2024-01-17 ENCOUNTER — HOSPITAL ENCOUNTER (OUTPATIENT)
Age: 69
Setting detail: OUTPATIENT SURGERY
Discharge: HOME | End: 2024-01-17
Attending: OBSTETRICS & GYNECOLOGY | Admitting: OBSTETRICS & GYNECOLOGY
Payer: COMMERCIAL

## 2024-01-17 VITALS
TEMPERATURE: 96.8 F | WEIGHT: 189.5 LBS | RESPIRATION RATE: 20 BRPM | BODY MASS INDEX: 37.21 KG/M2 | HEART RATE: 65 BPM | SYSTOLIC BLOOD PRESSURE: 103 MMHG | OXYGEN SATURATION: 97 % | DIASTOLIC BLOOD PRESSURE: 59 MMHG | HEIGHT: 60 IN

## 2024-01-17 DIAGNOSIS — N95.0 POST-MENOPAUSAL BLEEDING: ICD-10-CM

## 2024-01-17 DIAGNOSIS — Z01.818 PREOP TESTING: Primary | ICD-10-CM

## 2024-01-17 LAB
ALBUMIN SERPL-MCNC: 4 G/DL (ref 3.4–5)
ALBUMIN/GLOB SERPL: 1.1 {RATIO} (ref 1.1–2.2)
ALP SERPL-CCNC: 72 U/L (ref 40–129)
ALT SERPL-CCNC: 10 U/L (ref 10–40)
ANION GAP SERPL CALCULATED.3IONS-SCNC: 9 MMOL/L (ref 3–16)
AST SERPL-CCNC: 21 U/L (ref 15–37)
BILIRUB SERPL-MCNC: 0.3 MG/DL (ref 0–1)
BUN SERPL-MCNC: 12 MG/DL (ref 7–20)
CALCIUM SERPL-MCNC: 9.2 MG/DL (ref 8.3–10.6)
CHLORIDE SERPL-SCNC: 108 MMOL/L (ref 99–110)
CO2 SERPL-SCNC: 24 MMOL/L (ref 21–32)
CREAT SERPL-MCNC: 0.8 MG/DL (ref 0.6–1.2)
GFR SERPLBLD CREATININE-BSD FMLA CKD-EPI: >60 ML/MIN/{1.73_M2}
GLUCOSE BLD-MCNC: 92 MG/DL (ref 70–99)
GLUCOSE SERPL-MCNC: 99 MG/DL (ref 70–99)
HCT VFR BLD AUTO: 40.9 % (ref 36–48)
HGB BLD-MCNC: 13.5 G/DL (ref 12–16)
PERFORMED ON: NORMAL
POTASSIUM SERPL-SCNC: 4.4 MMOL/L (ref 3.5–5.1)
PROT SERPL-MCNC: 7.6 G/DL (ref 6.4–8.2)
SODIUM SERPL-SCNC: 141 MMOL/L (ref 136–145)

## 2024-01-17 PROCEDURE — 85014 HEMATOCRIT: CPT

## 2024-01-17 PROCEDURE — 85018 HEMOGLOBIN: CPT

## 2024-01-17 PROCEDURE — 6360000002 HC RX W HCPCS: Performed by: OBSTETRICS & GYNECOLOGY

## 2024-01-17 PROCEDURE — 6360000002 HC RX W HCPCS: Performed by: STUDENT IN AN ORGANIZED HEALTH CARE EDUCATION/TRAINING PROGRAM

## 2024-01-17 PROCEDURE — 6370000000 HC RX 637 (ALT 250 FOR IP): Performed by: STUDENT IN AN ORGANIZED HEALTH CARE EDUCATION/TRAINING PROGRAM

## 2024-01-17 PROCEDURE — 3600000004 HC SURGERY LEVEL 4 BASE: Performed by: OBSTETRICS & GYNECOLOGY

## 2024-01-17 PROCEDURE — 3600000014 HC SURGERY LEVEL 4 ADDTL 15MIN: Performed by: OBSTETRICS & GYNECOLOGY

## 2024-01-17 PROCEDURE — 36415 COLL VENOUS BLD VENIPUNCTURE: CPT

## 2024-01-17 PROCEDURE — A4217 STERILE WATER/SALINE, 500 ML: HCPCS | Performed by: OBSTETRICS & GYNECOLOGY

## 2024-01-17 PROCEDURE — 6360000002 HC RX W HCPCS: Performed by: REGISTERED NURSE

## 2024-01-17 PROCEDURE — 2580000003 HC RX 258: Performed by: REGISTERED NURSE

## 2024-01-17 PROCEDURE — 7100000011 HC PHASE II RECOVERY - ADDTL 15 MIN: Performed by: OBSTETRICS & GYNECOLOGY

## 2024-01-17 PROCEDURE — 2500000003 HC RX 250 WO HCPCS: Performed by: REGISTERED NURSE

## 2024-01-17 PROCEDURE — 80053 COMPREHEN METABOLIC PANEL: CPT

## 2024-01-17 PROCEDURE — 7100000001 HC PACU RECOVERY - ADDTL 15 MIN: Performed by: OBSTETRICS & GYNECOLOGY

## 2024-01-17 PROCEDURE — 2709999900 HC NON-CHARGEABLE SUPPLY: Performed by: OBSTETRICS & GYNECOLOGY

## 2024-01-17 PROCEDURE — 7100000000 HC PACU RECOVERY - FIRST 15 MIN: Performed by: OBSTETRICS & GYNECOLOGY

## 2024-01-17 PROCEDURE — 88305 TISSUE EXAM BY PATHOLOGIST: CPT

## 2024-01-17 PROCEDURE — 7100000010 HC PHASE II RECOVERY - FIRST 15 MIN: Performed by: OBSTETRICS & GYNECOLOGY

## 2024-01-17 PROCEDURE — 3700000000 HC ANESTHESIA ATTENDED CARE: Performed by: OBSTETRICS & GYNECOLOGY

## 2024-01-17 PROCEDURE — 2580000003 HC RX 258: Performed by: OBSTETRICS & GYNECOLOGY

## 2024-01-17 PROCEDURE — 3700000001 HC ADD 15 MINUTES (ANESTHESIA): Performed by: OBSTETRICS & GYNECOLOGY

## 2024-01-17 RX ORDER — PROPOFOL 10 MG/ML
INJECTION, EMULSION INTRAVENOUS PRN
Status: DISCONTINUED | OUTPATIENT
Start: 2024-01-17 | End: 2024-01-17 | Stop reason: SDUPTHER

## 2024-01-17 RX ORDER — IBUPROFEN 800 MG/1
800 TABLET ORAL EVERY 8 HOURS PRN
Qty: 40 TABLET | Refills: 3 | OUTPATIENT
Start: 2024-01-17

## 2024-01-17 RX ORDER — SODIUM CHLORIDE 9 MG/ML
INJECTION, SOLUTION INTRAVENOUS PRN
Status: DISCONTINUED | OUTPATIENT
Start: 2024-01-17 | End: 2024-01-17 | Stop reason: HOSPADM

## 2024-01-17 RX ORDER — FENTANYL CITRATE 50 UG/ML
INJECTION, SOLUTION INTRAMUSCULAR; INTRAVENOUS
Status: DISCONTINUED
Start: 2024-01-17 | End: 2024-01-17 | Stop reason: HOSPADM

## 2024-01-17 RX ORDER — ONDANSETRON 2 MG/ML
4 INJECTION INTRAMUSCULAR; INTRAVENOUS
Status: DISCONTINUED | OUTPATIENT
Start: 2024-01-17 | End: 2024-01-17 | Stop reason: HOSPADM

## 2024-01-17 RX ORDER — SODIUM CHLORIDE 9 MG/ML
INJECTION, SOLUTION INTRAVENOUS CONTINUOUS PRN
Status: DISCONTINUED | OUTPATIENT
Start: 2024-01-17 | End: 2024-01-17 | Stop reason: SDUPTHER

## 2024-01-17 RX ORDER — FENTANYL CITRATE 50 UG/ML
50 INJECTION, SOLUTION INTRAMUSCULAR; INTRAVENOUS EVERY 5 MIN PRN
Status: COMPLETED | OUTPATIENT
Start: 2024-01-17 | End: 2024-01-17

## 2024-01-17 RX ORDER — DEXAMETHASONE SODIUM PHOSPHATE 4 MG/ML
INJECTION, SOLUTION INTRA-ARTICULAR; INTRALESIONAL; INTRAMUSCULAR; INTRAVENOUS; SOFT TISSUE PRN
Status: DISCONTINUED | OUTPATIENT
Start: 2024-01-17 | End: 2024-01-17 | Stop reason: SDUPTHER

## 2024-01-17 RX ORDER — SODIUM CHLORIDE 9 MG/ML
INJECTION, SOLUTION INTRAVENOUS CONTINUOUS
Status: DISCONTINUED | OUTPATIENT
Start: 2024-01-17 | End: 2024-01-17 | Stop reason: HOSPADM

## 2024-01-17 RX ORDER — ACETAMINOPHEN 325 MG/1
650 TABLET ORAL ONCE
Status: COMPLETED | OUTPATIENT
Start: 2024-01-17 | End: 2024-01-17

## 2024-01-17 RX ORDER — SODIUM CHLORIDE 0.9 % (FLUSH) 0.9 %
5-40 SYRINGE (ML) INJECTION EVERY 12 HOURS SCHEDULED
Status: DISCONTINUED | OUTPATIENT
Start: 2024-01-17 | End: 2024-01-17 | Stop reason: HOSPADM

## 2024-01-17 RX ORDER — SODIUM CHLORIDE 0.9 % (FLUSH) 0.9 %
5-40 SYRINGE (ML) INJECTION PRN
Status: DISCONTINUED | OUTPATIENT
Start: 2024-01-17 | End: 2024-01-17 | Stop reason: HOSPADM

## 2024-01-17 RX ORDER — PHENYLEPHRINE HCL IN 0.9% NACL 1 MG/10 ML
SYRINGE (ML) INTRAVENOUS PRN
Status: DISCONTINUED | OUTPATIENT
Start: 2024-01-17 | End: 2024-01-17 | Stop reason: SDUPTHER

## 2024-01-17 RX ORDER — ONDANSETRON 2 MG/ML
INJECTION INTRAMUSCULAR; INTRAVENOUS PRN
Status: DISCONTINUED | OUTPATIENT
Start: 2024-01-17 | End: 2024-01-17 | Stop reason: SDUPTHER

## 2024-01-17 RX ORDER — GLYCOPYRROLATE 0.2 MG/ML
INJECTION INTRAMUSCULAR; INTRAVENOUS PRN
Status: DISCONTINUED | OUTPATIENT
Start: 2024-01-17 | End: 2024-01-17 | Stop reason: SDUPTHER

## 2024-01-17 RX ORDER — LIDOCAINE HYDROCHLORIDE 20 MG/ML
INJECTION, SOLUTION EPIDURAL; INFILTRATION; INTRACAUDAL; PERINEURAL PRN
Status: DISCONTINUED | OUTPATIENT
Start: 2024-01-17 | End: 2024-01-17 | Stop reason: SDUPTHER

## 2024-01-17 RX ORDER — MAGNESIUM HYDROXIDE 1200 MG/15ML
LIQUID ORAL CONTINUOUS PRN
Status: COMPLETED | OUTPATIENT
Start: 2024-01-17 | End: 2024-01-17

## 2024-01-17 RX ORDER — OXYCODONE HYDROCHLORIDE 5 MG/1
5 TABLET ORAL
Status: DISCONTINUED | OUTPATIENT
Start: 2024-01-17 | End: 2024-01-17 | Stop reason: HOSPADM

## 2024-01-17 RX ORDER — FENTANYL CITRATE 50 UG/ML
INJECTION, SOLUTION INTRAMUSCULAR; INTRAVENOUS PRN
Status: DISCONTINUED | OUTPATIENT
Start: 2024-01-17 | End: 2024-01-17 | Stop reason: SDUPTHER

## 2024-01-17 RX ORDER — HYDROMORPHONE HYDROCHLORIDE 2 MG/ML
0.5 INJECTION, SOLUTION INTRAMUSCULAR; INTRAVENOUS; SUBCUTANEOUS EVERY 5 MIN PRN
Status: DISCONTINUED | OUTPATIENT
Start: 2024-01-17 | End: 2024-01-17 | Stop reason: HOSPADM

## 2024-01-17 RX ORDER — SODIUM CHLORIDE, SODIUM LACTATE, POTASSIUM CHLORIDE, CALCIUM CHLORIDE 600; 310; 30; 20 MG/100ML; MG/100ML; MG/100ML; MG/100ML
INJECTION, SOLUTION INTRAVENOUS CONTINUOUS
Status: DISCONTINUED | OUTPATIENT
Start: 2024-01-17 | End: 2024-01-17 | Stop reason: HOSPADM

## 2024-01-17 RX ORDER — LIDOCAINE HYDROCHLORIDE 10 MG/ML
0.5 INJECTION, SOLUTION EPIDURAL; INFILTRATION; INTRACAUDAL; PERINEURAL ONCE
Status: DISCONTINUED | OUTPATIENT
Start: 2024-01-17 | End: 2024-01-17 | Stop reason: HOSPADM

## 2024-01-17 RX ORDER — IBUPROFEN 800 MG/1
800 TABLET ORAL EVERY 8 HOURS PRN
Qty: 40 TABLET | Refills: 3 | Status: SHIPPED | OUTPATIENT
Start: 2024-01-17

## 2024-01-17 RX ORDER — APREPITANT 40 MG/1
40 CAPSULE ORAL ONCE
Status: COMPLETED | OUTPATIENT
Start: 2024-01-17 | End: 2024-01-17

## 2024-01-17 RX ADMIN — APREPITANT 40 MG: 40 CAPSULE ORAL at 07:18

## 2024-01-17 RX ADMIN — Medication 200 MCG: at 07:40

## 2024-01-17 RX ADMIN — SODIUM CHLORIDE: 9 INJECTION, SOLUTION INTRAVENOUS at 06:33

## 2024-01-17 RX ADMIN — SODIUM CHLORIDE: 9 INJECTION, SOLUTION INTRAVENOUS at 07:30

## 2024-01-17 RX ADMIN — PROPOFOL 200 MG: 10 INJECTION, EMULSION INTRAVENOUS at 07:35

## 2024-01-17 RX ADMIN — Medication 300 MCG: at 07:47

## 2024-01-17 RX ADMIN — Medication 300 MCG: at 07:53

## 2024-01-17 RX ADMIN — GLYCOPYRROLATE 0.2 MG: 0.2 INJECTION, SOLUTION INTRAMUSCULAR; INTRAVENOUS at 07:42

## 2024-01-17 RX ADMIN — ACETAMINOPHEN 650 MG: 325 TABLET ORAL at 07:17

## 2024-01-17 RX ADMIN — LIDOCAINE HYDROCHLORIDE 100 MG: 20 INJECTION, SOLUTION EPIDURAL; INFILTRATION; INTRACAUDAL; PERINEURAL at 07:35

## 2024-01-17 RX ADMIN — DEXAMETHASONE SODIUM PHOSPHATE 8 MG: 4 INJECTION, SOLUTION INTRAMUSCULAR; INTRAVENOUS at 07:40

## 2024-01-17 RX ADMIN — FENTANYL CITRATE 50 MCG: 50 INJECTION, SOLUTION INTRAMUSCULAR; INTRAVENOUS at 07:35

## 2024-01-17 RX ADMIN — FENTANYL CITRATE 50 MCG: 50 INJECTION INTRAMUSCULAR; INTRAVENOUS at 08:35

## 2024-01-17 RX ADMIN — ONDANSETRON 4 MG: 2 INJECTION INTRAMUSCULAR; INTRAVENOUS at 07:40

## 2024-01-17 RX ADMIN — CEFAZOLIN 2000 MG: 2 INJECTION, POWDER, FOR SOLUTION INTRAMUSCULAR; INTRAVENOUS at 07:28

## 2024-01-17 RX ADMIN — Medication 300 MCG: at 08:04

## 2024-01-17 RX ADMIN — FENTANYL CITRATE 50 MCG: 50 INJECTION INTRAMUSCULAR; INTRAVENOUS at 08:40

## 2024-01-17 RX ADMIN — Medication 300 MCG: at 07:58

## 2024-01-17 ASSESSMENT — PAIN SCALES - GENERAL
PAINLEVEL_OUTOF10: 0
PAINLEVEL_OUTOF10: 5

## 2024-01-17 ASSESSMENT — PAIN DESCRIPTION - PAIN TYPE: TYPE: SURGICAL PAIN

## 2024-01-17 ASSESSMENT — PAIN - FUNCTIONAL ASSESSMENT: PAIN_FUNCTIONAL_ASSESSMENT: NONE - DENIES PAIN

## 2024-01-17 ASSESSMENT — ENCOUNTER SYMPTOMS: SHORTNESS OF BREATH: 0

## 2024-01-17 NOTE — PROGRESS NOTES
Intermittent Straight Cath performed per TREE Juarez RN. Output documented under Flowsheets: Intake/ Output.    80mL of urine noted

## 2024-01-17 NOTE — DISCHARGE INSTRUCTIONS
GYN DISCHARGE INSTRUCTIONS    Follow your surgeon's instructions.  Make a follow-up appointment to see your surgeon as directed.  Observe the operative area for signs of excessive bleeding. Call for vaginal bleeding heavier than a normal period.  Observe the operative area for signs of infection, such as increased pain, redness, fever greater than 101 degrees, swelling, foul odor, or yellow/green drainage. If present, contact your surgeon.  Take medication as directed.Take pain medication with food.Do not drive while taking narcotics.  If you have problems urinating, notify your surgeon.  Take showers instead of baths the next few days.  No tampons,douching or sexual intercourse unless advised by your surgeon.  No heavy lifting, workouts or aerobics for 3-4 days.  What you can expect:  Drainage similar to period that gets lighter in flow and turns brown.  Some cramping for 24 hours  Bleeding intermittent for 2-3 weeks  Some soreness in legs and back  Heavier bleeding during next period for LEEP procedures  Call surgeon for any problems or questions.    ANESTHESIA DISCHARGE INSTRUCTIONS    Wear your seatbelt home.  You are under the influence of drugs-do not drink alcohol,drive,operate machinery,or make any important decisions or sign any legal documentsfor 24 hours  A responsible adult needs to be with you for 24 hours.  You may experience lightheadedness,dizziness,or sleepiness following surgery.  Rest at home today- increase activity as tolerated.  Progress slowly to a regular diet unless your physician has instructed you otherwise.Drink plenty of water.  If nausea becomes a problem call your physician.  Coughing,sore throat,and muscle aches are other side effects of anesthesia,and should improve with time.  Do not drive,operate machinery while taking narcotics.

## 2024-01-17 NOTE — PROGRESS NOTES
Discharge instructions review with patient and pt family member. All home medications have been reviewed, pt v/u.  Pt discharged via wheelchair. Pt discharged with instructions, prescription and all belongings. Pt family member taking stable pt home.

## 2024-01-17 NOTE — PROGRESS NOTES
Teaching / education initiated regarding perioperative experience, expectations, and pain management during stay. Patient verbalized understanding.      Jonathon/[employer]/[id number] at bedside at all times

## 2024-01-17 NOTE — PROGRESS NOTES
Pt awake and alert at this time. Pt on RA, and VSS. Pt denies nausea, tolerating PO.  Pt meets criteria to be discharged from Phase 1.

## 2024-01-17 NOTE — ANESTHESIA PRE PROCEDURE
Department of Anesthesiology  Preprocedure Note       Name:  Leticia Eid   Age:  68 y.o.  :  1955                                          MRN:  [id number]         Date:  2024      Surgeon: Surgeon(s):  Misael Adams DO    Procedure: Procedure(s):  HYSTEROSCOPY, DILATATION AND CURETTAGE    Medications prior to admission:   Prior to Admission medications    Medication Sig Start Date End Date Taking? Authorizing Provider   DULoxetine (CYMBALTA) 60 MG extended release capsule Take 1 capsule by mouth daily  Patient not taking: Reported on 2024   Solomon Balderrama APRN - CNP   naproxen (NAPROSYN) 500 MG tablet Take 1 tablet by mouth 2 times daily as needed for Pain  Patient not taking: Reported on 2024   Solomon Balderrama APRN - CNP   cyclobenzaprine (FLEXERIL) 10 MG tablet Take 1 tablet by mouth 3 times daily as needed for Muscle spasms  Patient not taking: Reported on 2024   Solomon Balderrama APRN - CNP   acetaminophen (TYLENOL) 500 MG tablet Take 1 tablet by mouth every 6 hours as needed for Pain  Patient not taking: Reported on 2024    ProviderJayy MD   aspirin 81 MG chewable tablet 1 tablet daily    ProviderJayy MD       Current medications:    Current Facility-Administered Medications   Medication Dose Route Frequency Provider Last Rate Last Admin    ceFAZolin (ANCEF) 2,000 mg in sodium chloride 0.9 % 50 mL IVPB (mini-bag)  2,000 mg IntraVENous On Call to OR Misael Adams,  mL/hr at 24 0634 2,000 mg at 24 0634    lactated ringers IV soln infusion   IntraVENous Continuous Misael Adams DO        lidocaine PF 1 % injection 0.5 mL  0.5 mL IntraDERmal Once Misael Adams DO        0.9 % sodium chloride infusion   IntraVENous Continuous Misael Adams,  mL/hr at 24 0633 New Bag at 24 0633       Allergies:  No Known Allergies    Problem List:    Patient Active Problem List   Diagnosis Code

## 2024-01-17 NOTE — H&P
Department of Gynecology   Pre-operative History and Physical        DIAGNOSIS:  post menopausal bleeding    PLANNED PROCEDURE:  diagnostic hysteroscopy, dilation and curettage    Reason for Admission:  surgery    History obtained from pt    Past Medical History:    Past Medical History:   Diagnosis Date    Hypertension         Past Surgical History:    Past Surgical History:   Procedure Laterality Date    BUNIONECTOMY      COLONOSCOPY N/A 9/6/2019    COLONOSCOPY performed by Rodrigo Rain MD at [facility] ENDOSCOPY    DILATION AND CURETTAGE OF UTERUS  4/2/12    Dilatation and curetage, hysteroscopy, endometrial biopsy    DILATION AND CURETTAGE OF UTERUS  03/09/2017    HYSTEROSCOPY DILATATION AND CURETTAGE MYOSURE    HYSTEROSCOPY      HYSTEROSCOPY N/A 08/16/2018    HYSTEROSCOPY DILATATION AND CURETTAGE MYOSURE    UPPER GASTROINTESTINAL ENDOSCOPY N/A 3/1/2021    EGD BIOPSY performed by Rodrigo Rain MD at [facility] ENDOSCOPY        Medications Prior to Admission:  Medications Prior to Admission: DULoxetine (CYMBALTA) 60 MG extended release capsule, Take 1 capsule by mouth daily (Patient not taking: Reported on 1/17/2024)  naproxen (NAPROSYN) 500 MG tablet, Take 1 tablet by mouth 2 times daily as needed for Pain (Patient not taking: Reported on 1/17/2024)  cyclobenzaprine (FLEXERIL) 10 MG tablet, Take 1 tablet by mouth 3 times daily as needed for Muscle spasms (Patient not taking: Reported on 1/17/2024)  acetaminophen (TYLENOL) 500 MG tablet, Take 1 tablet by mouth every 6 hours as needed for Pain (Patient not taking: Reported on 1/17/2024)  aspirin 81 MG chewable tablet, 1 tablet daily     Allergies:  No Known Allergies     Social History:   reports that she has never smoked. She has never used smokeless tobacco. She reports that she does not drink alcohol and does not use drugs.     PHYSICAL EXAM:    Patient Vitals for the past 24 hrs:   BP Temp Temp src Pulse Resp SpO2 Height Weight   01/17/24 0618 119/70 97.5 °F

## 2024-01-17 NOTE — ANESTHESIA POSTPROCEDURE EVALUATION
Department of Anesthesiology  Postprocedure Note    Patient: Leticia Eid  MRN: [id number]  YOB: 1955  Date of evaluation: 1/17/2024    Procedure Summary       Date: 01/17/24 Room / Location: [address]    Anesthesia Start: 0731 Anesthesia Stop: 0824    Procedure: HYSTEROSCOPY, DILATATION AND CURETTAGE (Vagina ) Diagnosis:       Post-menopausal bleeding      (Post-menopausal bleeding [N95.0])    Surgeons: Misael Adams DO Responsible Provider: Tracy Chopra MD    Anesthesia Type: general ASA Status: 2            Anesthesia Type: No value filed.    Can Phase I: Can Score: 10    Can Phase II: Can Score: 10    Anesthesia Post Evaluation    Patient location during evaluation: bedside  Patient participation: complete - patient participated  Level of consciousness: awake and alert  Pain score: 1  Airway patency: patent  Nausea & Vomiting: no vomiting  Cardiovascular status: hemodynamically stable  Respiratory status: nonlabored ventilation  Hydration status: stable  Multimodal analgesia pain management approach  Pain management: adequate    No notable events documented.

## 2024-01-17 NOTE — SUBJECTIVE & OBJECTIVE
Subjective:     Prolonged vaginal bleeding.Post menopausal.    Objective:     Vitals:    01/10/24 1341 01/17/24 0618   BP:  119/70   Pulse:  70   Resp:  18   Temp:  97.5 °F (36.4 °C)   TempSrc:  Temporal   SpO2:  100%   Weight: 86.2 kg (190 lb) 86 kg (189 lb 8 oz)   Height: 1.524 m (5') 1.524 m (5')        Intake andOutput:  Current Shift: No intake/output data recorded.  Last three shifts: No intake/output data recorded.     Physical Exam  HEENT unremarkable  Lungs clear  HRRR  Abd soft.     Office US shows thickened endometrium.     Medications:  Current Facility-Administered Medications   Medication Dose Route Frequency    lactated ringers IV soln infusion   IntraVENous Continuous    lidocaine PF 1 % injection 0.5 mL  0.5 mL IntraDERmal Once    0.9 % sodium chloride infusion   IntraVENous Continuous        Medications Reviewed    :A: Post menopausal bleeding        Refused in office biopsy  Plan: hysteroscopy, dilation and curettage.

## 2024-01-17 NOTE — PROGRESS NOTES
Pt arrived from OR to PACU bay 4. Reported received from OR staff. Pt asleep upon arrival to room, spontaneous respirations noted. Pt arrives with simple mask in place, infusing 6L oxygen, vitals as charted.

## 2024-01-17 NOTE — PROGRESS NOTES
This is an operative summary on Leticia Eid    Preoperative diagnosis postmenopausal uterine bleeding    Postoperative diagnosis postmenopausal uterine bleeding, path pending    Procedure hysteroscopy and D&C    Surgeon Bryan    Anesthesia General    Blood loss minimal    After informed consent was signed a 68-year-old  female was taken to the operative suite given general anesthetics placed in the dorsolithotomy position she had her perineum and vagina prepped reprepped and draped in her bladder emptied with a red rubber catheter.  A side open speculum was placed in the vagina but this was ultimately switched to just using Arias retractors the cervix was nulliparous and very tight.  The hysteroscope needed to be placed into the cervix to see which way the cervical canal went and then it was progressively dilated enough to allow placement of the hysteroscope but still could not get a dilated enough to be able to use the outflow tract of the hysteroscope which had to be removed in order to place the hysteroscope for visualization of the endometrial cavity was suboptimal but no obvious masses were seen.  The cervix was then curetted a very small amount of tissue.  The cervix was lacerated with a single-tooth tenaculum and needed to be oversewn with 3 stitches of 3-0 chromic.  Patient tolerated procedure well was taken recovery in stable condition discharge medication will be Motrin she will follow in the office in a week we will discuss the pathology report and where we are going in the future.

## 2024-01-22 ENCOUNTER — OFFICE VISIT (OUTPATIENT)
Dept: INTERNAL MEDICINE CLINIC | Age: 69
End: 2024-01-22
Payer: COMMERCIAL

## 2024-01-22 VITALS
WEIGHT: 194 LBS | DIASTOLIC BLOOD PRESSURE: 86 MMHG | OXYGEN SATURATION: 99 % | BODY MASS INDEX: 38.09 KG/M2 | HEART RATE: 78 BPM | SYSTOLIC BLOOD PRESSURE: 120 MMHG | HEIGHT: 60 IN

## 2024-01-22 DIAGNOSIS — Z78.9 LANGUAGE BARRIER: ICD-10-CM

## 2024-01-22 DIAGNOSIS — M50.20 CERVICAL HERNIATION: ICD-10-CM

## 2024-01-22 DIAGNOSIS — M54.12 CERVICAL RADICULOPATHY: ICD-10-CM

## 2024-01-22 DIAGNOSIS — Z98.890 STATUS POST D&C: ICD-10-CM

## 2024-01-22 DIAGNOSIS — N95.0 POSTMENOPAUSAL BLEEDING: Primary | ICD-10-CM

## 2024-01-22 DIAGNOSIS — F33.1 MODERATE EPISODE OF RECURRENT MAJOR DEPRESSIVE DISORDER (HCC): ICD-10-CM

## 2024-01-22 PROCEDURE — 1090F PRES/ABSN URINE INCON ASSESS: CPT | Performed by: NURSE PRACTITIONER

## 2024-01-22 PROCEDURE — 3017F COLORECTAL CA SCREEN DOC REV: CPT | Performed by: NURSE PRACTITIONER

## 2024-01-22 PROCEDURE — 99214 OFFICE O/P EST MOD 30 MIN: CPT | Performed by: NURSE PRACTITIONER

## 2024-01-22 PROCEDURE — 1036F TOBACCO NON-USER: CPT | Performed by: NURSE PRACTITIONER

## 2024-01-22 PROCEDURE — G8399 PT W/DXA RESULTS DOCUMENT: HCPCS | Performed by: NURSE PRACTITIONER

## 2024-01-22 PROCEDURE — G8484 FLU IMMUNIZE NO ADMIN: HCPCS | Performed by: NURSE PRACTITIONER

## 2024-01-22 PROCEDURE — G8417 CALC BMI ABV UP PARAM F/U: HCPCS | Performed by: NURSE PRACTITIONER

## 2024-01-22 PROCEDURE — G8427 DOCREV CUR MEDS BY ELIG CLIN: HCPCS | Performed by: NURSE PRACTITIONER

## 2024-01-22 PROCEDURE — 1123F ACP DISCUSS/DSCN MKR DOCD: CPT | Performed by: NURSE PRACTITIONER

## 2024-01-22 RX ORDER — DULOXETIN HYDROCHLORIDE 30 MG/1
30 CAPSULE, DELAYED RELEASE ORAL DAILY
Qty: 30 CAPSULE | Refills: 5 | Status: SHIPPED | OUTPATIENT
Start: 2024-01-22

## 2024-01-22 ASSESSMENT — PATIENT HEALTH QUESTIONNAIRE - PHQ9
6. FEELING BAD ABOUT YOURSELF - OR THAT YOU ARE A FAILURE OR HAVE LET YOURSELF OR YOUR FAMILY DOWN: 0
2. FEELING DOWN, DEPRESSED OR HOPELESS: 1
SUM OF ALL RESPONSES TO PHQ9 QUESTIONS 1 & 2: 1
10. IF YOU CHECKED OFF ANY PROBLEMS, HOW DIFFICULT HAVE THESE PROBLEMS MADE IT FOR YOU TO DO YOUR WORK, TAKE CARE OF THINGS AT HOME, OR GET ALONG WITH OTHER PEOPLE: 1
SUM OF ALL RESPONSES TO PHQ QUESTIONS 1-9: 1
5. POOR APPETITE OR OVEREATING: 0
SUM OF ALL RESPONSES TO PHQ QUESTIONS 1-9: 1
SUM OF ALL RESPONSES TO PHQ QUESTIONS 1-9: 1
1. LITTLE INTEREST OR PLEASURE IN DOING THINGS: 0
9. THOUGHTS THAT YOU WOULD BE BETTER OFF DEAD, OR OF HURTING YOURSELF: 0
4. FEELING TIRED OR HAVING LITTLE ENERGY: 0
3. TROUBLE FALLING OR STAYING ASLEEP: 0
7. TROUBLE CONCENTRATING ON THINGS, SUCH AS READING THE NEWSPAPER OR WATCHING TELEVISION: 0
SUM OF ALL RESPONSES TO PHQ QUESTIONS 1-9: 1
8. MOVING OR SPEAKING SO SLOWLY THAT OTHER PEOPLE COULD HAVE NOTICED. OR THE OPPOSITE, BEING SO FIGETY OR RESTLESS THAT YOU HAVE BEEN MOVING AROUND A LOT MORE THAN USUAL: 0

## 2024-01-30 ENCOUNTER — HOSPITAL ENCOUNTER (OUTPATIENT)
Dept: WOMENS IMAGING | Age: 69
Discharge: HOME | End: 2024-01-30
Payer: COMMERCIAL

## 2024-01-30 VITALS — HEIGHT: 60 IN | WEIGHT: 194 LBS | BODY MASS INDEX: 38.09 KG/M2

## 2024-01-30 DIAGNOSIS — Z12.31 SCREENING MAMMOGRAM FOR BREAST CANCER: ICD-10-CM

## 2024-01-30 PROCEDURE — 77063 BREAST TOMOSYNTHESIS BI: CPT

## 2024-01-31 ENCOUNTER — TELEPHONE (OUTPATIENT)
Dept: INTERNAL MEDICINE CLINIC | Age: 69
End: 2024-01-31

## 2024-01-31 ASSESSMENT — ENCOUNTER SYMPTOMS
RESPIRATORY NEGATIVE: 1
GASTROINTESTINAL NEGATIVE: 1

## 2024-01-31 NOTE — PROGRESS NOTES
Constitutional:       General: She is not in acute distress.     Appearance: She is well-developed. She is not diaphoretic.   HENT:      Head: Normocephalic and atraumatic.   Eyes:      General: No scleral icterus.     Conjunctiva/sclera: Conjunctivae normal.   Neck:      Vascular: No JVD.   Cardiovascular:      Rate and Rhythm: Normal rate and regular rhythm.   Pulmonary:      Effort: Pulmonary effort is normal. No respiratory distress.      Breath sounds: Normal breath sounds. No wheezing.   Abdominal:      General: There is no distension.      Palpations: Abdomen is soft.      Tenderness: There is no abdominal tenderness. There is no guarding or rebound.   Musculoskeletal:         General: Normal range of motion.      Cervical back: Neck supple.   Skin:     General: Skin is warm and dry.   Neurological:      Mental Status: She is alert and oriented to person, place, and time.   Psychiatric:         Behavior: Behavior normal.         Thought Content: Thought content normal.        /86   Pulse 78   Ht 1.524 m (5')   Wt 88 kg (194 lb)   SpO2 99%   BMI 37.89 kg/m²          1/22/2024     3:43 PM 9/28/2023     7:27 PM 1/9/2023     3:58 PM 11/15/2022     4:17 PM   PHQ Scores   PHQ2 Score 1  1 2   PHQ9 Score 1 3 8 2     Interpretation of Total Score Depression Severity: 1-4 = Minimal depression, 5-9 = Mild depression, 10-14 = Moderate depression, 15-19 = Moderately severe depression, 20-27 =Severe depression        ASSESSMENT/PLAN:  Leticia was seen today for results and depression.  Diagnoses and all orders for this visit:    Postmenopausal bleeding  Status post D&C  -Postmenopausal bleeding bleeding which was evaluated by gynecology and she underwent hospital D&C  -Pathology appears to be unremarkable  -She has had some mild spotting post D&C but no ongoing vaginal bleeding  -Recommend she follow-up with gynecology if bleeding persists    Moderate episode of recurrent major depressive disorder  No

## 2024-01-31 NOTE — TELEPHONE ENCOUNTER
Called pt re: to discuss rescheduling upon her request. Spoke to pts alternate contact and rescheduled appt.

## 2024-02-21 ENCOUNTER — OFFICE VISIT (OUTPATIENT)
Dept: PSYCHOLOGY | Age: 69
End: 2024-02-21
Payer: COMMERCIAL

## 2024-02-21 DIAGNOSIS — F33.1 MODERATE EPISODE OF RECURRENT MAJOR DEPRESSIVE DISORDER (HCC): Primary | ICD-10-CM

## 2024-02-21 PROCEDURE — 1123F ACP DISCUSS/DSCN MKR DOCD: CPT | Performed by: PSYCHOLOGIST

## 2024-02-21 PROCEDURE — 90832 PSYTX W PT 30 MINUTES: CPT | Performed by: PSYCHOLOGIST

## 2024-02-21 PROCEDURE — 1036F TOBACCO NON-USER: CPT | Performed by: PSYCHOLOGIST

## 2024-02-21 NOTE — PROGRESS NOTES
Minimal depression, 5-9 = Mild depression, 10-14 = Moderate depression, 15-19 = Moderately severe depression, 20-27 = Severe depression         No data to display              Interpretation of CLAUDIA-7 score: 5-9 = mild anxiety, 10-14 = moderate anxiety, 15+ = severe anxiety. Recommend referral to behavioral health for scores 10 or greater.    Diagnosis:  Major depressive disorder; recurrent and moderate     Plan:  Pt interventions:  Brookesmith-setting to identify pt's primary goals for [facility] visit / overall health, Supportive techniques, Emphasized self-care as important for managing overall health, and Provided Psychoeducation re: behavioral activation

## 2024-02-22 ENCOUNTER — HOSPITAL ENCOUNTER (OUTPATIENT)
Dept: GENERAL RADIOLOGY | Age: 69
Discharge: HOME | End: 2024-02-22
Payer: COMMERCIAL

## 2024-02-22 ENCOUNTER — OFFICE VISIT (OUTPATIENT)
Dept: INTERNAL MEDICINE CLINIC | Age: 69
End: 2024-02-22
Payer: COMMERCIAL

## 2024-02-22 ENCOUNTER — HOSPITAL ENCOUNTER (OUTPATIENT)
Age: 69
Discharge: HOME | End: 2024-02-22
Payer: COMMERCIAL

## 2024-02-22 VITALS
HEART RATE: 74 BPM | BODY MASS INDEX: 39.66 KG/M2 | WEIGHT: 202 LBS | OXYGEN SATURATION: 97 % | SYSTOLIC BLOOD PRESSURE: 118 MMHG | DIASTOLIC BLOOD PRESSURE: 60 MMHG | HEIGHT: 60 IN

## 2024-02-22 DIAGNOSIS — S69.92XA INJURY OF LEFT HAND, INITIAL ENCOUNTER: Primary | ICD-10-CM

## 2024-02-22 DIAGNOSIS — M79.89 SWELLING OF LEFT HAND: ICD-10-CM
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
## 2024-02-22 DIAGNOSIS — M79.642 LEFT HAND PAIN: ICD-10-CM

## 2024-02-22 PROCEDURE — G8399 PT W/DXA RESULTS DOCUMENT: HCPCS | Performed by: NURSE PRACTITIONER

## 2024-02-22 PROCEDURE — 1090F PRES/ABSN URINE INCON ASSESS: CPT | Performed by: NURSE PRACTITIONER

## 2024-02-22 PROCEDURE — G8484 FLU IMMUNIZE NO ADMIN: HCPCS | Performed by: NURSE PRACTITIONER

## 2024-02-22 PROCEDURE — 3017F COLORECTAL CA SCREEN DOC REV: CPT | Performed by: NURSE PRACTITIONER

## 2024-02-22 PROCEDURE — 73140 X-RAY EXAM OF FINGER(S): CPT

## 2024-02-22 PROCEDURE — G8427 DOCREV CUR MEDS BY ELIG CLIN: HCPCS | Performed by: NURSE PRACTITIONER

## 2024-02-22 PROCEDURE — 99213 OFFICE O/P EST LOW 20 MIN: CPT | Performed by: NURSE PRACTITIONER

## 2024-02-22 PROCEDURE — 1123F ACP DISCUSS/DSCN MKR DOCD: CPT | Performed by: NURSE PRACTITIONER

## 2024-02-22 PROCEDURE — 1036F TOBACCO NON-USER: CPT | Performed by: NURSE PRACTITIONER

## 2024-02-22 PROCEDURE — G8417 CALC BMI ABV UP PARAM F/U: HCPCS | Performed by: NURSE PRACTITIONER

## 2024-02-22 RX ORDER — METHYLPREDNISOLONE 4 MG/1
TABLET ORAL
Qty: 1 KIT | Refills: 0 | Status: SHIPPED | OUTPATIENT
Start: 2024-02-22 | End: 2024-02-28

## 2024-02-29 ASSESSMENT — ENCOUNTER SYMPTOMS
GASTROINTESTINAL NEGATIVE: 1
RESPIRATORY NEGATIVE: 1

## 2024-03-17 ENCOUNTER — APPOINTMENT (OUTPATIENT)
Dept: GENERAL RADIOLOGY | Age: 69
End: 2024-03-17
Payer: COMMERCIAL

## 2024-03-17 ENCOUNTER — HOSPITAL ENCOUNTER (EMERGENCY)
Age: 69
Discharge: HOME | End: 2024-03-17
Attending: EMERGENCY MEDICINE
Payer: COMMERCIAL

## 2024-03-17 VITALS
WEIGHT: 195 LBS | RESPIRATION RATE: 18 BRPM | TEMPERATURE: 97.8 F | DIASTOLIC BLOOD PRESSURE: 87 MMHG | HEIGHT: 60 IN | BODY MASS INDEX: 38.28 KG/M2 | HEART RATE: 97 BPM | OXYGEN SATURATION: 95 % | SYSTOLIC BLOOD PRESSURE: 147 MMHG

## 2024-03-17 DIAGNOSIS — S80.02XA CONTUSION OF LEFT KNEE AND LOWER LEG, INITIAL ENCOUNTER: ICD-10-CM

## 2024-03-17 DIAGNOSIS — S80.12XA CONTUSION OF LEFT KNEE AND LOWER LEG, INITIAL ENCOUNTER: ICD-10-CM
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
## 2024-03-17 DIAGNOSIS — S40.022A ARM CONTUSION, LEFT, INITIAL ENCOUNTER: ICD-10-CM

## 2024-03-17 PROCEDURE — 73562 X-RAY EXAM OF KNEE 3: CPT

## 2024-03-17 PROCEDURE — 99284 EMERGENCY DEPT VISIT MOD MDM: CPT

## 2024-03-17 PROCEDURE — 73070 X-RAY EXAM OF ELBOW: CPT

## 2024-03-17 PROCEDURE — 73060 X-RAY EXAM OF HUMERUS: CPT

## 2024-03-17 PROCEDURE — 73030 X-RAY EXAM OF SHOULDER: CPT

## 2024-03-17 PROCEDURE — 6370000000 HC RX 637 (ALT 250 FOR IP): Performed by: EMERGENCY MEDICINE

## 2024-03-17 PROCEDURE — 90714 TD VACC NO PRESV 7 YRS+ IM: CPT | Performed by: EMERGENCY MEDICINE

## 2024-03-17 PROCEDURE — 90471 IMMUNIZATION ADMIN: CPT | Performed by: EMERGENCY MEDICINE

## 2024-03-17 PROCEDURE — 6360000002 HC RX W HCPCS: Performed by: EMERGENCY MEDICINE

## 2024-03-17 RX ORDER — IBUPROFEN 600 MG/1
600 TABLET ORAL ONCE
Status: COMPLETED | OUTPATIENT
Start: 2024-03-17 | End: 2024-03-17

## 2024-03-17 RX ADMIN — IBUPROFEN 600 MG: 600 TABLET, FILM COATED ORAL at 14:59

## 2024-03-17 RX ADMIN — CLOSTRIDIUM TETANI TOXOID ANTIGEN (FORMALDEHYDE INACTIVATED) AND CORYNEBACTERIUM DIPHTHERIAE TOXOID ANTIGEN (FORMALDEHYDE INACTIVATED) 0.5 ML: 5; 2 INJECTION, SUSPENSION INTRAMUSCULAR at 14:59

## 2024-03-17 ASSESSMENT — PAIN DESCRIPTION - ORIENTATION: ORIENTATION: RIGHT;LEFT

## 2024-03-17 ASSESSMENT — PAIN DESCRIPTION - FREQUENCY: FREQUENCY: CONTINUOUS

## 2024-03-17 ASSESSMENT — PAIN - FUNCTIONAL ASSESSMENT: PAIN_FUNCTIONAL_ASSESSMENT: 0-10

## 2024-03-17 ASSESSMENT — PAIN SCALES - GENERAL: PAINLEVEL_OUTOF10: 10

## 2024-03-17 ASSESSMENT — PAIN DESCRIPTION - PAIN TYPE: TYPE: ACUTE PAIN

## 2024-03-17 ASSESSMENT — PAIN DESCRIPTION - DESCRIPTORS: DESCRIPTORS: ACHING

## 2024-03-17 NOTE — ED PROVIDER NOTES
lacerations. Normal range of motion. Tenderness present over the lateral joint line.      Right lower leg: Normal. No edema.      Left lower leg: Normal. No edema.      Right ankle: No swelling, deformity, ecchymosis or lacerations. No tenderness. Normal range of motion. Anterior drawer test negative. Normal pulse.      Left ankle: No swelling, deformity, ecchymosis or lacerations. No tenderness. Normal range of motion. Anterior drawer test negative. Normal pulse.      Right foot: Normal range of motion and normal capillary refill. No swelling, deformity, bunion, Charcot foot, foot drop, laceration, tenderness, bony tenderness or crepitus. Normal pulse.      Left foot: Normal range of motion and normal capillary refill. No swelling, deformity, bunion, Charcot foot, foot drop, laceration, tenderness, bony tenderness or crepitus. Normal pulse.      Comments: Significant tenderness and swelling and ecchymoses to the lateral left upper arm and elbow    , Lateral left knee with significant contusions, abrasions   Skin:     General: Skin is warm and dry.      Capillary Refill: Capillary refill takes less than 2 seconds.      Findings: No lesion or rash.   Neurological:      General: No focal deficit present.      Mental Status: She is alert and oriented to person, place, and time.      GCS: GCS eye subscore is 4. GCS verbal subscore is 5. GCS motor subscore is 6.      Cranial Nerves: No cranial nerve deficit, dysarthria or facial asymmetry.      Sensory: Sensation is intact. No sensory deficit.      Motor: Motor function is intact. No weakness, tremor, atrophy, abnormal muscle tone or pronator drift.      Coordination: Coordination is intact. Coordination normal. Finger-Nose-Finger Test and Heel to Shin Test normal.   Psychiatric:         Mood and Affect: Mood normal.         Behavior: Behavior normal.         DIAGNOSTIC RESULTS   LABS:    Labs Reviewed - No data to display    When ordered only abnormal lab results are  Additional Notes: Right side of neck 0.8cm by 0.8cm\\n\\nUpper back 1.0cm by 1.0cm Detail Level: Zone Render Risk Assessment In Note?: no

## 2024-03-21 ENCOUNTER — OFFICE VISIT (OUTPATIENT)
Dept: INTERNAL MEDICINE CLINIC | Age: 69
End: 2024-03-21
Payer: COMMERCIAL

## 2024-03-21 VITALS
DIASTOLIC BLOOD PRESSURE: 80 MMHG | HEART RATE: 78 BPM | BODY MASS INDEX: 40.17 KG/M2 | HEIGHT: 60 IN | OXYGEN SATURATION: 97 % | WEIGHT: 204.6 LBS | SYSTOLIC BLOOD PRESSURE: 124 MMHG

## 2024-03-21 DIAGNOSIS — M25.511 ACUTE PAIN OF RIGHT SHOULDER: ICD-10-CM

## 2024-03-21 DIAGNOSIS — M79.642 LEFT HAND PAIN: ICD-10-CM

## 2024-03-21 DIAGNOSIS — Z78.9 LANGUAGE BARRIER: ICD-10-CM

## 2024-03-21 DIAGNOSIS — M25.512 ACUTE PAIN OF LEFT SHOULDER: ICD-10-CM

## 2024-03-21 DIAGNOSIS — M25.511 ACUTE PAIN OF RIGHT SHOULDER: Primary | ICD-10-CM

## 2024-03-21 LAB
DEPRECATED RDW RBC AUTO: 14.3 % (ref 12.4–15.4)
HCT VFR BLD AUTO: 36.2 % (ref 36–48)
HGB BLD-MCNC: 11.9 G/DL (ref 12–16)
MCH RBC QN AUTO: 28 PG (ref 26–34)
MCHC RBC AUTO-ENTMCNC: 33 G/DL (ref 31–36)
MCV RBC AUTO: 84.9 FL (ref 80–100)
PLATELET # BLD AUTO: 256 K/UL (ref 135–450)
PMV BLD AUTO: 9.3 FL (ref 5–10.5)
RBC # BLD AUTO: 4.26 M/UL (ref 4–5.2)
WBC # BLD AUTO: 6.9 K/UL (ref 4–11)

## 2024-03-21 PROCEDURE — 1090F PRES/ABSN URINE INCON ASSESS: CPT | Performed by: INTERNAL MEDICINE

## 2024-03-21 PROCEDURE — G8399 PT W/DXA RESULTS DOCUMENT: HCPCS | Performed by: INTERNAL MEDICINE

## 2024-03-21 PROCEDURE — G8417 CALC BMI ABV UP PARAM F/U: HCPCS | Performed by: INTERNAL MEDICINE

## 2024-03-21 PROCEDURE — 1036F TOBACCO NON-USER: CPT | Performed by: INTERNAL MEDICINE

## 2024-03-21 PROCEDURE — 1123F ACP DISCUSS/DSCN MKR DOCD: CPT | Performed by: INTERNAL MEDICINE

## 2024-03-21 PROCEDURE — G8484 FLU IMMUNIZE NO ADMIN: HCPCS | Performed by: INTERNAL MEDICINE

## 2024-03-21 PROCEDURE — G8427 DOCREV CUR MEDS BY ELIG CLIN: HCPCS | Performed by: INTERNAL MEDICINE

## 2024-03-21 PROCEDURE — 3017F COLORECTAL CA SCREEN DOC REV: CPT | Performed by: INTERNAL MEDICINE

## 2024-03-21 PROCEDURE — 99214 OFFICE O/P EST MOD 30 MIN: CPT | Performed by: INTERNAL MEDICINE

## 2024-03-21 SDOH — ECONOMIC STABILITY: FOOD INSECURITY: WITHIN THE PAST 12 MONTHS, YOU WORRIED THAT YOUR FOOD WOULD RUN OUT BEFORE YOU GOT MONEY TO BUY MORE.: NEVER TRUE

## 2024-03-21 SDOH — ECONOMIC STABILITY: FOOD INSECURITY: WITHIN THE PAST 12 MONTHS, THE FOOD YOU BOUGHT JUST DIDN'T LAST AND YOU DIDN'T HAVE MONEY TO GET MORE.: NEVER TRUE

## 2024-03-21 SDOH — ECONOMIC STABILITY: INCOME INSECURITY: HOW HARD IS IT FOR YOU TO PAY FOR THE VERY BASICS LIKE FOOD, HOUSING, MEDICAL CARE, AND HEATING?: NOT HARD AT ALL

## 2024-03-21 ASSESSMENT — ENCOUNTER SYMPTOMS
SWOLLEN GLANDS: 0
SORE THROAT: 0

## 2024-03-21 NOTE — PATIENT INSTRUCTIONS
Recursos financieros del área de [place]*  (Llame al 211 si necesita más recursos)    [facility] Financial Assistance  Lo que ofrecen: programas de asistencia financiera diseñados para ayudarle a encontrar recursos que puedan ayudarle a pagar la factura de [facility]. Niyah socrates en los siguientes enlaces para obtener más información sobre los programas de asistencia financiera disponibles en nuestras regiones.  Número de teléfono: [phone]  Cómo solicitar el Programa de asistencia financiera de [facility]:     Opción 1: para solicitar asistencia financiera, un paciente (o brasher marcelo u otro proveedor) debe completar la solicitud de asistencia financiera. Se pueden obtener copias de la solicitud de asistencia financiera y obtener el Programa de Asistencia Financiera (Financial Assistance Program, FAP) de forma gratuita llamando al Departamento de Atención al Cliente de [facility] al [phone].  Opción 2: la solicitud de asistencia financiera y la política pueden obtenerse de forma gratuita descargando jessica copia del sitio web de [facility]:  [email or web address]  [facility]: Programa de asociación  Lo que ofrecen: si la presión financiera está obstaculizando brasher capacidad para satisfacer las necesidades de atención de la griffin, el Programa de asociación de [facility] podría ayudarlo. Proporciona apoyo a pacientes que se enfrentan a problemas de griffin complejos y ofelia sociales. Estos servicios se proporcionan sin costo alguno.   Pacientes elegibles  Adultos con un nivel de pobreza igual o inferior al 200 %.  Sin seguro, con seguro insuficiente o con riesgo de perder el seguro de griffin.  Puede ser elegible para recibir:  Asistencia individual para la inscripción en Medicaid, cobertura de atención de la griffin de [employer], asistencia financiera y otros programas de beneficios.  Gestión de casos a corto o jax plazo para ayudarlo a ponerse en contacto
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
## 2024-03-22 NOTE — RESULT ENCOUNTER NOTE
Please let the patient know that results shows that her hemoglobin has dropped by about 1-1/2 points given the significant ecchymosis she had that is not unusual I would have her repeat the blood test when she sees her PCP and we would plan on that in about 1 month

## 2024-04-10 ENCOUNTER — OFFICE VISIT (OUTPATIENT)
Dept: PSYCHOLOGY | Age: 69
End: 2024-04-10
Payer: COMMERCIAL

## 2024-04-10 DIAGNOSIS — F33.1 MODERATE EPISODE OF RECURRENT MAJOR DEPRESSIVE DISORDER (HCC): Primary | ICD-10-CM

## 2024-04-10 PROCEDURE — 1036F TOBACCO NON-USER: CPT | Performed by: PSYCHOLOGIST

## 2024-04-10 PROCEDURE — 90834 PSYTX W PT 45 MINUTES: CPT | Performed by: PSYCHOLOGIST

## 2024-04-10 PROCEDURE — 1123F ACP DISCUSS/DSCN MKR DOCD: CPT | Performed by: PSYCHOLOGIST

## 2024-04-10 NOTE — PATIENT INSTRUCTIONS
[facility]  Hospitalist Discharge Summary      Date of Admission:  12/6/2023  Date of Discharge:  1/10/2024  Discharging Provider: Golden Logan MD  Discharge Service: Hospitalist Service    Discharge Diagnoses     Acute COPD exacerbation  Hospital acquired pneumonia  Acute on chronic hypoxemic respiratory failure   Lung mass s/p radiation treatment and probable radiation pneumonitis.  BRENNEN   Pulmonary edema    Clinically Significant Risk Factors          Follow-ups Needed After Discharge   Follow-up Appointments     Follow Up and recommended labs and tests      Follow up with Nursing home physician.  No follow up labs or test are   needed.            Unresulted Labs Ordered in the Past 30 Days of this Admission       No orders found from 11/6/2023 to 12/7/2023.          Discharge Disposition   Discharged to home  Condition at discharge: Stable    Hospital Course      Harrison Thomas is a 77 year old male with medical history significant for chronic hypoxic respiratory failure [has oxygen as needed at home], COPD, RUL lung mass s/p radiation, untreated BRENNEN, HTN, HLD, CAD, hypothyroidism admitted on 12/6/2023 with acute on chronic hypoxic respiratory failure likely 2/2 COPD exacerbation.      COPD exacerbation  Hospital acquired pneumonia  Acute on chronic hypoxemic respiratory failure   Lung mass s/p radiation treatment and probable radiation pneumonitis.  BRENNEN   Pulmonary edema  *Follows with Dr. Handy with pulmonology at [facility], last seen in September 2023.  On low dose chronic prednisone (2mg).  *Patient presented to the ED with increased shortness of breath over the past week, increased oxygen use at home.  *Admission clinical picture consistent with COPD exacerbation - poor air movement with diffuse expiratory wheezing - placed on steroids and nebs.  *BNP and procalcitonin wnl, COVID/flu/RSV negative on admission, resp viral panel negative (12/15).  *CT pulm angio 12/11 negative for PE,  shows mild bronchial wall thickening and extensive emphysema.  *Underwent sniff test this admission that was negative.  *Received 5-day treatment course azithromycin at admission, resumed  mg every other day dosing for prophylaxis.  *Intermittently has required high flow nasal cannula and BiPAP  *[facility] pulmonary following.  *Transitioned from IV solumedrol to prednisone 12/20.  -Continue pantoprazole until tapered back to PTA dose of prednisone.  -Duonebs qid.  -Home inhaler on hold.  -Respiratory culture obtained 12/22/23, grew 1+ Candida albicans.  -[facility] pulmonary following, discussed with Dr. Connor on 12/29.  Has received empiric intermittent IV diuretics for potential volume overload with inconsistent results.  -Hypoxia improving, patient down to 1 L oxygen via nasal cannula however continues to be severely short of breath with minimal activity  -As per pulmonary recommendations started on MetaNebs yesterday, RT following  -Prior hospitalist discussed with daughter on the phone and with the patient at the bedside.  He is still opposed to going to transitional care.  He has had a prolonged hospitalization and respiratory status is still not stable.  He is going to be superhigh risk for readmission with his tenuous pulmonary status.  I discussed palliative care evaluation which he is thinking about however he would like to discuss with pulmonary tomorrow to see if there are any other options for improvement.  -Clinical exam concerning for volume overload again, he is very crackly.  Was give Lasix 40 mg IV x 1.  Plan:  -Repeat chest x-ray 1/1/24 reveals nearly resolved right upper lobe opacity, and improved bibasilar opacities consistent with improving infection/inflammation. Cardiomediastinal silhouette is unremarkable.    -Palliative was consulted to assist with goals of care -> pursing TCU given improvement and restorative cares.  -Repeat chest x-ray on 1/5/24 -follow-up results -> New platelike opacity  within the right upper lung (similar to 12/23/2023) could reflect infectious/inflammatory process versus platelike atelectasis. Similar blunting of the right costophrenic angle, likely scarring/pleural thickening. No left pleural effusion. No convincing pneumothorax. Unchanged cardiomediastinal silhouette.  -SW consulted for TCU placement.  -Now on RA  -Continue Duonebs QID and Mucomyst QID (do with DuoNebs)     Hypernatremia - resolved  -Sodium now within normal limits.     Hypertension   Hyperlipidemia  Coronary artery disease  -Continue PTA metoprolol, amlodipine, aspirin, rosuvastatin     Chronic low back pain   *Patient is supposed to be getting a lumbar MRI soon.  Can be done as an outpatient.  -Continue PTA oxycodone.  -Gabapentin changed from 600 mg bid to 400 mg tid, continue.  -Hold naproxen for now in the setting of high-dose steroids since he would be at high risk for upper GI bleed and ulcers.  -Oral PPI for prophylaxis.  -Continue PRN Flexeril.     Constipation, resolved  *Given concern for abdominal distention, obtained abdominal x-ray on 12/18 that still showed large amount of stool throughout the colon.  Started having bowel movement on 12/19/23 and has been having at least daily bowel movements since then.     Acute urinary retention-resolved  *Required straight cath x3 12/20-12/21 and martinez placed.  -Started flomax 12/21/23.  -successful trial of voiding on 12/23/23.   -Continue to monitor for retention     Hx of papillary thyroid cancer s/p thyroidectomy   Hypothyroidism   -Continue PTA levothyroxine.     Insomnia   -Continue PTA Trazodone.     Leukocytosis  -Related to steroids and pneumonia.  -WBC back to normal.     Thrombocytopenia, resolved  Platelets in low 100s.  Not on any heparin products on admission through 12/20.  He is on baby aspirin twice daily, no signs of bleeding.  -Platelets 134 on 12/16/23, slowly trended down but now resolved  -Lovenox prophylaxis started 12/21/23 as  platelets have trended up and are back to normal -> ASA 81 mg BID     Bilateral pulmonary nodules  CT chest 12/11 showing scattered small bilateral pulmonary nodules measuring up to 5 mm are unchanged.  -Continued surveillance recommended.       Consultations This Hospital Stay   RESPIRATORY CARE IP CONSULT  CARE MANAGEMENT / SOCIAL WORK IP CONSULT  PHYSICAL THERAPY ADULT IP CONSULT  PULMONARY IP CONSULT  PULMONARY IP CONSULT  PHYSICAL THERAPY ADULT IP CONSULT  SPIRITUAL HEALTH SERVICES IP CONSULT  SPIRITUAL HEALTH SERVICES IP CONSULT  PALLIATIVE CARE ADULT IP CONSULT  PHYSICAL THERAPY ADULT IP CONSULT  SMOKING CESSATION PROGRAM IP CONSULT    Code Status   No CPR- Do NOT Intubate    Time Spent on this Encounter   I, Golden Logan MD, personally saw the patient today and spent greater than 30 minutes discharging this patient.       Golden Logan MD  [place] [address] [facility]  [address] [place] [address]  Phone: [phone]  ______________________________________________________________________    Physical Exam   Vital Signs: Temp: 98.2  F (36.8  C) Temp src: Oral BP: (!) 140/83 Pulse: 70   Resp: 20 SpO2: 90 % O2 Device: None (Room air)    Weight: 178 lbs 11.2 oz  ----------------------------------------------------------------------------------------       Primary Care Physician   Yaneli Dozier    Discharge Orders      Medication Therapy Management Referral      General info for SNF    Length of Stay Estimate: Short Term Care: Estimated # of Days <30  Condition at Discharge: Stable  Level of care:skilled   Rehabilitation Potential: Fair  Admission H&P remains valid and up-to-date: Yes  Recent Chemotherapy: N/A  Use Nursing Home Standing Orders: Yes     Mantoux instructions    Give two-step Mantoux (PPD) Per Facility Policy Yes     Follow Up and recommended labs and tests    Follow up with Nursing home physician.  No follow up labs or test are needed.     Intake and output    Every  shift     Daily weights    Call Provider for weight gain of more than 2 pounds per day or 5 pounds per week.     Reason for your hospital stay    You had shortness of breath from COPD exacerbation.     Activity - Up with assistive device     No CPR- Do NOT Intubate     Physical Therapy Adult Consult    Evaluate and treat as clinically indicated.    Reason:  physical deconditioning     Oxygen (SNF/TCU) Discharge     Fall precautions     Diet    Follow this diet upon discharge: Orders Placed This Encounter      Regular Diet Adult       Significant Results and Procedures   Most Recent 3 CBC's:  Recent Labs   Lab Test 01/10/24  1054 01/07/24  0843 01/05/24  2356 12/29/23  1255 12/28/23  0724 12/26/23  1615   WBC  --   --  6.1  --  7.0 4.6   HGB  --   --  11.5*  --  13.2* 12.8*   MCV  --   --  93  --  93 94   * 170 175   < > 215 169    < > = values in this interval not displayed.     Most Recent 3 BMP's:  Recent Labs   Lab Test 01/10/24  1234 01/10/24  0834 01/10/24  0207 01/09/24  1216 01/09/24  1107 01/07/24  1252 01/07/24  0843 01/06/24  0744 01/06/24  0734 01/05/24  1751 01/05/24  1507 01/03/24  1726 01/03/24  1244 01/01/24  1738 01/01/24  1654   NA  --   --   --   --   --   --  146*  --   --   --   --   --  142  --  142   POTASSIUM  --   --   --   --   --   --  3.6  3.6  --  3.6  --  4.2  --  4.0   < > 4.0   CHLORIDE  --   --   --   --   --   --  105  --   --   --   --   --  102  --  100   CO2  --   --   --   --   --   --  33*  --   --   --   --   --  34*  --  34*   BUN  --   --   --   --   --   --  10.9  --   --   --   --   --  12.7  --  12.1   CR  --   --   --   --  0.93  --  0.79  --  0.78  --   --   --  0.88  --  0.81   ANIONGAP  --   --   --   --   --   --  8  --   --   --   --   --  6*  --  8   KARLIE  --   --   --   --   --   --  8.7*  --   --   --   --   --  8.9  --  9.1   GLC 99 80 122*   < >  --    < > 80   < >  --    < >  --    < > 126*   < > 173*    < > = values in this interval not displayed.      Most Recent 2 LFT's:  Recent Labs   Lab Test 12/08/23  1158 12/06/23  1801   AST 11 21   ALT 19 24   ALKPHOS 155* 179*   BILITOTAL 0.4 0.3     Most Recent 3 INR's:  Recent Labs   Lab Test 06/13/23  0654 04/21/23  1043 02/11/22  0749   INR 1.09 0.94 1.03     Most Recent 3 Troponin's:  Recent Labs   Lab Test 02/24/19  2119   TROPI <0.015     Most Recent 3 BNP's:  Recent Labs   Lab Test 12/18/23  1313 12/06/23  1801 01/15/23  0322   NTBNPI 515 369 2,374*     Most Recent D-dimer:No lab results found.  Most Recent Cholesterol Panel:  Recent Labs   Lab Test 12/01/23  0749   CHOL 149   LDL 65   HDL 50   TRIG 172*     7-Day Micro Results       No results found for the last 168 hours.          Most Recent TSH and T4:  Recent Labs   Lab Test 12/01/23  0749   TSH 1.76   T4 1.49     Most Recent Hemoglobin A1c:  Recent Labs   Lab Test 12/07/23  1257   A1C 5.4     Most Recent 6 glucoses:  Recent Labs   Lab Test 01/10/24  1234 01/10/24  0834 01/10/24  0207 01/09/24  2102 01/09/24  1709 01/09/24  1216   GLC 99 80 122* 92 143* 148*     Most Recent Urinalysis:  Recent Labs   Lab Test 01/15/23  1126 04/05/19  1225   COLOR Yellow Yellow   APPEARANCE Slightly Cloudy* Clear   URINEGLC Negative Negative   URINEBILI Negative Negative   URINEKETONE 40* Negative   SG 1.020 1.015   UBLD Large* Moderate*   URINEPH 5.5 7.5*   PROTEIN 70* Negative   UROBILINOGEN  --  0.2   NITRITE Negative Positive*   LEUKEST Negative Negative   RBCU 14* O - 2   WBCU 0 5-10*     Most Recent ESR & CRP:  Recent Labs   Lab Test 12/01/23  0749 02/09/23  1225 01/10/23  0709 12/28/22  1045 12/03/22  1004   SED  --   --   --   --  24*   CRP  --   --  181.0*   < >  --    CRPI 4.38   < >  --   --  26.80*    < > = values in this interval not displayed.   ,   Results for orders placed or performed during the hospital encounter of 12/06/23   XR Chest Port 1 View    Narrative    EXAM: XR CHEST PORT 1 VIEW  LOCATION: [facility]  DATE:  12/6/2023    INDICATION: SOB  COMPARISON: CT chest 10/26/2023      Impression    IMPRESSION: Faint residual opacities in the right upper lobe corresponding to right upper lobe mass and posttreatment changes. No other focal opacities. No pleural effusion or pneumothorax. Normal cardiomediastinal silhouette.   XR Chest/Heart Fluoro    Narrative    CHEST/HEART FLUORO 12/11/2023 9:29 AM     HISTORY: Right upper lobe lung cancer post radiation with severe  dyspnea.    COMPARISON: None.    FLUOROSCOPY TIME: 0.1 minutes.    SPOT FILMS: 1    FINDINGS: The hemidiaphragms move downward in a symmetric fashion when  the patient sniffs.      Impression    IMPRESSION: Negative sniff test.    RANDELL LOTT MD         SYSTEM ID:  [id number]   CT Chest Pulmonary Embolism w Contrast    Narrative    EXAM: CT CHEST PULMONARY EMBOLISM W CONTRAST  LOCATION: [facility]  DATE: 12/11/2023    INDICATION: Abrupt worsening of dyspnea 3 d PTA and history of RUL lung cancer post radiation.   COMPARISON: 10/26/2023.  TECHNIQUE: CT chest pulmonary angiogram during arterial phase injection of IV contrast. Multiplanar reformats and MIP reconstructions were performed. Dose reduction techniques were used.   CONTRAST: 68 mL Isovue 370    FINDINGS:  ANGIOGRAM CHEST: Pulmonary arteries are normal caliber and negative for pulmonary emboli. Thoracic aorta is negative for dissection. No CT evidence of right heart strain.    LUNGS AND PLEURA: Prominent emphysematous changes in the lungs. Similar-appearing mostly linear area of consolidation and volume loss in the right upper lobe just superior and lateral to the right hilum compatible with posttreatment related changes.   Nothing definite for acute pneumonitis. Multiple scattered small bilateral pulmonary nodules measuring up to 5 mm are unchanged but remain indeterminate. Mild generalized bronchial wall thickening more so in the lower lungs suggests bronchiolitis. No   pleural  effusions.    MEDIASTINUM/AXILLAE: Normal.    CORONARY ARTERY CALCIFICATION: Severe.    UPPER ABDOMEN: Stable postoperative changes partial right hepatectomy. Cholelithiasis. Benign-appearing right renal cyst.    MUSCULOSKELETAL: Mild to moderate degenerative changes in the spine and stable mild multilevel vertebral body compression fractures in the midthoracic spine.      Impression    IMPRESSION:  1.  Negative for pulmonary embolism.    2.  Stable area of linear consolidation and volume loss in the right upper lobe compatible with posttreatment related changes.    3.  Marked emphysematous changes in the lungs. Nothing definite for acute pneumonitis. However, there is some generalized bronchial wall thickening more so in the lower lungs suggesting bronchiolitis.    4.  Scattered small bilateral pulmonary nodules measuring up to 5 mm are unchanged. Continued surveillance recommended.    5.  Severe coronary artery calcification.    6.  Cholelithiasis.   XR Chest Port 1 View    Narrative    CHEST ONE VIEW  12/18/2023 8:26 AM     HISTORY: COPD, mass.    COMPARISON: December 6, 2023      Impression    IMPRESSION: Increased consolidation in the right upper lobe since  comparison. Lungs otherwise clear.     RANDELL LOTT MD         SYSTEM ID:  [id number]   XR Abdomen 1 View    Narrative    XR ABDOMEN 1 VIEW 12/18/2023 6:19 PM    HISTORY: Distended abdomen    COMPARISON: None.      Impression    IMPRESSION: Large amount of stool throughout the colon consistent with  constipation. No obstruction.    ES KEANE MD         SYSTEM ID:  [id number]   CT Chest w/o Contrast    Narrative    EXAM: CT CHEST WITHOUT CONTRAST  LOCATION: [facility]  DATE/TIME: 12/21/2023 6:32 AM CST    INDICATION: History of a right upper lobe lung cancer status post radiation. Persistent hypoxic respiratory failure.  COMPARISON: 12/11/2023.    TECHNIQUE: CT chest without IV contrast. Multiplanar reformats were obtained. Dose  reduction techniques were used.  CONTRAST: None.    FINDINGS:    LUNGS AND PLEURA: Moderate to severe emphysematous changes in the lungs. A band-like opacity in the upper right lung (series 6 image 66), unchanged since the prior study and likely representing the site of the right upper lobe lung cancer described in   the clinical history status post treatment. Several small patchy opacities are scattered within bilateral lower lobes and less so the right middle lobe, new since 12/11/2023. A few less than 0.6 cm diameter nodules scattered within both lungs, unchanged.   Mild diffuse bronchial wall thickening.    MEDIASTINUM/AXILLAE: Atherosclerotic calcification in the thoracic aorta.    CORONARY ARTERY CALCIFICATION: Present.    MUSCULOSKELETAL: No acute findings.    UPPER ABDOMEN: A few tiny probable gallstones in the gallbladder.      Impression    IMPRESSION:   1.  Several small patchy opacities scattered within bilateral lower lobes and less so the right middle lobe, new since 12/11/2023. These likely represent pneumonia.  2.  Mild diffuse bronchial wall thickening, most likely infectious or inflammatory in etiology.  3.  Moderate to severe emphysematous changes in the lungs.   XR Chest Port 1 View    Narrative    EXAM: XR CHEST PORT 1 VIEW  LOCATION: [facility]  DATE: 12/23/2023    INDICATION: Increased WOB, previous abnormal CT chest exam  COMPARISON: CT chest 12/21/2023      Impression    IMPRESSION: Low lung volumes. Stable right upper lobe and patchy bibasilar airspace opacities consistent with pneumonia. Stable blunting of the right costophrenic recess likely related to scarring. Stable heart size.   XR Chest Port 1 View    Narrative    EXAM: XR CHEST PORT 1 VIEW  LOCATION: [facility]  DATE: 1/1/2024    INDICATION: PNA f u  COMPARISON: Chest radiograph 12/23/2023. CT chest 12/21/2023.      Impression    IMPRESSION: Nearly resolved right upper lobe  opacity, and improved bibasilar opacities consistent with improving infection/inflammation. Cardiomediastinal silhouette is unremarkable.               XR Chest Port 1 View    Narrative    CHEST ONE VIEW  2024 3:32 PM     HISTORY: Hypoxia.    COMPARISON: Chest radiograph 2024.      Impression    IMPRESSION: New platelike opacity within the right upper lung (similar  to 2023) could reflect infectious/inflammatory process versus  platelike atelectasis. Similar blunting of the right costophrenic  angle, likely scarring/pleural thickening. No left pleural effusion.  No convincing pneumothorax. Unchanged cardiomediastinal silhouette.    JENNIFER ARREDONDO MD         SYSTEM ID:  [id number]   Echocardiogram Complete     Value    LVEF  60-65%    Narrative    [id number]  [id number]  [id number]^MCKAY^HSRAVAN^JULIANNA     [facility]  Echocardiography Laboratory  [address] [place] [address]     Name: ABDIRASHID EPSTEIN  MRN: [id number]  : 1946  Study Date: 2023 09:08 AM  Age: 77 yrs  Gender: Male  Patient Location: [facility]  Reason For Study: CHF  Ordering Physician: SHRAVAN BASHIR  Referring Physician: SHRAVAN BASHIR  Performed By: Ciarra Mata     BSA: 2.0 m2  Height: 70 in  Weight: 179 lb  HR: 64  ______________________________________________________________________________  Procedure  Complete Echo Adult. Optison (NDC #0155-6095) given intravenously.  ______________________________________________________________________________  Interpretation Summary     The visual ejection fraction is 60-65%.  The left ventricle is normal in structure, function and size.  No regional wall motion abnormalities noted.  The right ventricle is normal in structure, function and size.  There is no pericardial effusion.     No significant change when compared to 23 study  ______________________________________________________________________________  Left  Ventricle  The left ventricle is normal in structure, function and size. There is normal  left ventricular wall thickness. The visual ejection fraction is 60-65%. Left  ventricular diastolic function is indeterminate. No regional wall motion  abnormalities noted.     Right Ventricle  The right ventricle is normal in structure, function and size.     Atria  Normal left atrial size. Right atrial size is normal.     Mitral Valve  There is mild (1+) mitral regurgitation.     Tricuspid Valve  There is trace to mild tricuspid regurgitation.     Aortic Valve  No aortic regurgitation is present.     Pulmonic Valve  The pulmonic valve is not well visualized.     Vessels  Normal size aorta. Normal size ascending aorta. The inferior vena cava is  normal.     Pericardium  There is no pericardial effusion.     ______________________________________________________________________________  MMode/2D Measurements & Calculations  IVSd: 1.1 cm  LVIDd: 3.8 cm  LVIDs: 2.3 cm  LVPWd: 0.95 cm  FS: 40.6 %     LV mass(C)d: 121.3 grams  LV mass(C)dI: 60.9 grams/m2  Ao root diam: 3.1 cm  asc Aorta Diam: 3.4 cm  LVOT diam: 2.0 cm  LVOT area: 3.1 cm2  Ao root diam index Ht(cm/m): 1.7  Ao root diam index BSA (cm/m2): 1.6  Asc Ao diam index BSA (cm/m2): 1.7  Asc Ao diam index Ht(cm/m): 1.9  LA Volume (BP): 55.5 ml  LA Volume Index (BP): 27.9 ml/m2     RWT: 0.50  TAPSE: 2.4 cm     Doppler Measurements & Calculations  MV E max yung: 112.0 cm/sec  MV A max yung: 104.0 cm/sec  MV E/A: 1.1  MV dec slope: 619.0 cm/sec2  MV dec time: 0.18 sec  Ao V2 max: 153.0 cm/sec  Ao max P.0 mmHg  BALDEMAR(V,D): 2.4 cm2  LV V1 max P.5 mmHg  LV V1 max: 117.0 cm/sec  LV V1 VTI: 26.4 cm  SV(LVOT): 82.9 ml  SI(LVOT): 41.7 ml/m2  PA V2 max: 104.0 cm/sec  PA max P.3 mmHg  PA acc time: 0.07 sec  AV Yung Ratio (DI): 0.76  E/E' av.3  Lateral E/e': 10.3  Medial E/e': 12.4  RV S Yung: 18.7 cm/sec    garantía o responsabilidad por brasher uso de esta información.               Recuperación de la depresión: Instrucciones de cuidado  Recovering From Depression: Care Instructions  Instrucciones de cuidado     Tener un buen cuidado de usted mismo es importante a medida que se recupera de la depresión. Con el tiempo, a medida que el tratamiento funcione alistair síntomas desaparecerán. No lo abandone. Al contrario, concentre brasher energía en recuperarse.  Brasher estado de ánimo mejorará. Solo tomará un tiempo. Concéntrese en cosas que le pueden ayudar a sentirse mejor, cara estar con amigos o familiares, comer dixie y descansar lo suficiente. Mickey tómese las cosas con tranquilidad. No vane muchas actividades demasiado pronto. Comenzará a sentirse mejor poco a poco.  La atención de seguimiento es jessica parte clave de brasher tratamiento y seguridad. Asegúrese de hacer y acudir a todas las citas, y llame a brasher médico si está teniendo problemas. También es jessica buena idea saber los resultados de alistair exámenes y mantener jessica lista de los medicamentos que meli.  ¿Cómo puede cuidarse en el hogar?  Sea realista  Si debe hacer jessica tarea que le llevará mucho tiempo, divídala en varias etapas pequeñas que usted pueda manejar y vane solo lo que pueda.  Es posible que quiera posponer las decisiones importantes hasta que se haya recuperado de la depresión. Si tiene planes que tendrán un gran impacto en brasher south, cara casarse, divorciarse o cambiar de trabajo, intente esperar un poco. Háblelo con ailstair amigos y seres queridos, quienes pueden ayudarle a analizar el panorama completo.  Es importante acercarse a las personas para pedirles ayuda. No se aísle. Deje que brasher marcelo y amigos le ayuden. Encuentre a alguien en quien pueda confiar y hable con carlos persona.  Sea paciente y bondadoso consigo mismo. Recuerde que la depresión no es brasher culpa y no es algo que usted pueda superar solo con fuerza de voluntad. El tratamiento es importante para la depresión, al    ______________________________________________________________________________  Report approved by: MICHEL Duval 12/24/2023 10:19 AM           *Note: Due to a large number of results and/or encounters for the requested time period, some results have not been displayed. A complete set of results can be found in Results Review.       Discharge Medications   Current Discharge Medication List        START taking these medications    Details   acetylcysteine (MUCOMYST) 20 % neb solution Take 2 mLs by nebulization 4 times daily    Associated Diagnoses: Chronic obstructive pulmonary disease with acute exacerbation (H)      bisacodyl (DULCOLAX) 10 MG suppository Place 1 suppository (10 mg) rectally daily as needed for constipation    Associated Diagnoses: Chronic obstructive pulmonary disease with acute exacerbation (H)      calcium carbonate (TUMS) 500 MG chewable tablet Take 1 tablet (500 mg) by mouth 4 times daily as needed for heartburn    Associated Diagnoses: Chronic obstructive pulmonary disease with acute exacerbation (H)      carboxymethylcellulose PF (REFRESH PLUS) 0.5 % ophthalmic solution Place 1 drop into both eyes every hour as needed for dry eyes    Associated Diagnoses: Chronic obstructive pulmonary disease with acute exacerbation (H)      ipratropium - albuterol 0.5 mg/2.5 mg/3 mL (DUONEB) 0.5-2.5 (3) MG/3ML neb solution Take 1 vial (3 mLs) by nebulization 4 times daily    Associated Diagnoses: Chronic obstructive pulmonary disease with acute exacerbation (H)      pantoprazole (PROTONIX) 40 MG EC tablet Take 1 tablet (40 mg) by mouth every morning (before breakfast)    Associated Diagnoses: Chronic obstructive pulmonary disease with acute exacerbation (H)      polyethylene glycol (MIRALAX) 17 GM/Dose powder Take 17 g by mouth daily    Associated Diagnoses: Chronic obstructive pulmonary disease with acute exacerbation (H)      tamsulosin (FLOMAX) 0.4 MG capsule Take 1 capsule (0.4 mg) by mouth  daily    Associated Diagnoses: Chronic obstructive pulmonary disease with acute exacerbation (H)           CONTINUE these medications which have CHANGED    Details   furosemide (LASIX) 40 MG tablet Take 1 tablet (40 mg) by mouth daily    Associated Diagnoses: Chronic obstructive pulmonary disease with acute exacerbation (H)      gabapentin (NEURONTIN) 400 MG capsule Take 1 capsule (400 mg) by mouth 3 times daily    Associated Diagnoses: Chronic obstructive pulmonary disease with acute exacerbation (H)      metoprolol succinate ER (TOPROL XL) 50 MG 24 hr tablet Take 1 tablet (50 mg) by mouth every evening    Associated Diagnoses: Chronic obstructive pulmonary disease with acute exacerbation (H)      oxyCODONE (ROXICODONE) 5 MG tablet Take 1 tablet (5 mg) by mouth every 6 hours as needed for moderate pain  Qty: 15 tablet, Refills: 0    Associated Diagnoses: Chronic obstructive pulmonary disease with acute exacerbation (H)      potassium chloride ER (KLOR-CON M) 20 MEQ CR tablet Take 1 tablet (20 mEq) by mouth daily    Associated Diagnoses: Hypokalemia      predniSONE (DELTASONE) 5 MG tablet Take 2 tablets (10 mg) by mouth daily for 7 days, THEN 1 tablet (5 mg) daily for 7 days, THEN 0.5 tablets (2.5 mg) daily for 30 days.    Associated Diagnoses: Chronic obstructive pulmonary disease with acute exacerbation (H)           CONTINUE these medications which have NOT CHANGED    Details   acetaminophen (TYLENOL) 500 MG tablet Take 1,000 mg by mouth every 8 hours as needed      albuterol (ACCUNEB) 1.25 MG/3ML neb solution Take 1 vial (1.25 mg) by nebulization every 4 hours as needed for shortness of breath or wheezing  Qty: 90 mL, Refills: 4    Associated Diagnoses: COPD exacerbation (H)      albuterol (PROAIR HFA/PROVENTIL HFA/VENTOLIN HFA) 108 (90 Base) MCG/ACT inhaler INHALE 2 PUFFS BY MOUTH EVERY 6 HOURS AS NEEDED FOR WHEEZE OR FOR SHORTNESS OF BREATH  Qty: 18 g, Refills: 3    Associated Diagnoses: Chronic obstructive  pulmonary disease with (acute) exacerbation (H)      alendronate (FOSAMAX) 70 MG tablet Take 1 tablet (70 mg) by mouth every 7 days  Qty: 12 tablet, Refills: 3    Associated Diagnoses: Senile osteoporosis      amLODIPine (NORVASC) 5 MG tablet Take 1 tablet (5 mg) by mouth at bedtime  Qty: 90 tablet, Refills: 3    Associated Diagnoses: Benign essential hypertension      ascorbic acid 1000 MG TABS tablet Take 1,000 mg by mouth daily      aspirin 81 MG EC tablet Take 81 mg by mouth 2 times daily    Associated Diagnoses: Closed fracture of neck of right femur, initial encounter (H)      azithromycin (ZITHROMAX) 500 MG tablet Take 1 tablet (500 mg) by mouth every other day  Qty: 31 tablet, Refills: 3    Associated Diagnoses: COPD, very severe (H)      calcium carbonate (OS-KARLIE) 1500 (600 Ca) MG tablet Take 600 mg by mouth daily      cholecalciferol 25 MCG (1000 UT) TABS Take 1,000 Units by mouth daily       cyclobenzaprine (FLEXERIL) 5 MG tablet Take 1 tablet (5 mg) by mouth nightly as needed for muscle spasms  Qty: 21 tablet, Refills: 1    Associated Diagnoses: Falls, initial encounter; Acute right-sided low back pain without sciatica; Chronic right-sided low back pain with right-sided sciatica      DULoxetine (CYMBALTA) 20 MG capsule Take 1 capsule (20 mg) by mouth 2 times daily  Qty: 180 capsule, Refills: 3    Associated Diagnoses: PAVAN (generalized anxiety disorder); Situational depression      Fluticasone-Umeclidin-Vilant (TRELEGY ELLIPTA) 100-62.5-25 MCG/ACT oral inhaler Inhale 1 puff into the lungs daily  Qty: 90 each, Refills: 3    Associated Diagnoses: Obstructive chronic bronchitis with exacerbation (H)      guaiFENesin (MUCINEX) 600 MG 12 hr tablet Take 600 mg by mouth 2 times daily as needed for congestion      hydrocortisone 2.5 % cream Apply topically 2 times daily as needed for itching    Associated Diagnoses: Pruritic rash      levothyroxine (SYNTHROID/LEVOTHROID) 125 MCG tablet Take 1 tablet (125 mcg) by  mouth daily  Qty: 90 tablet, Refills: 3    Associated Diagnoses: Postsurgical hypothyroidism; Papillary thyroid carcinoma (H)      MegaRed Omega-3 Krill Oil 500 MG CAPS Take 500 mg by mouth daily    Comments: Patient reported. OTC.      Melatonin 10 MG TABS tablet Take 10 mg by mouth At Bedtime    Comments: Patient reported. OTC.      multivitamin w/minerals (THERA-VIT-M) tablet Take 1 tablet by mouth daily       naloxone (NARCAN) 4 MG/0.1ML nasal spray Spray 1 spray (4 mg) into one nostril alternating nostrils once as needed for opioid reversal every 2-3 minutes until assistance arrives  Qty: 0.2 mL, Refills: 3    Associated Diagnoses: Spinal stenosis of lumbar region with neurogenic claudication; Metastasis from thyroid cancer (H); Rheumatoid arthritis involving multiple sites with positive rheumatoid factor (H)      nitroGLYcerin (NITROSTAT) 0.4 MG sublingual tablet FOR CHEST PAIN PLACE 1 TAB UNDER TONGUE EVERY 5 MIN FOR 3 DOSES. IF SYMPTOMS PERSIST 5 MIN AFTER 1ST DOSE CALL 911  Qty: 25 tablet, Refills: 3    Comments: DX Code Needed  PLEASE SEND PT STATES ONES HE HAS ARE CLOSE TO .  Associated Diagnoses: Coronary artery disease involving native coronary artery of native heart without angina pectoris      senna-docusate (SENOKOT-S/PERICOLACE) 8.6-50 MG tablet Take 1 tablet by mouth 2 times daily as needed for constipation      traZODone (DESYREL) 50 MG tablet Take 50 mg by mouth At Bedtime      rosuvastatin (CRESTOR) 10 MG tablet TAKE 1 TABLET BY MOUTH EVERY DAY  Qty: 90 tablet, Refills: 3    Associated Diagnoses: Hyperlipidemia, unspecified           STOP taking these medications       diclofenac (VOLTAREN) 1 % topical gel Comments:   Reason for Stopping:         naproxen (NAPROSYN) 500 MG tablet Comments:   Reason for Stopping:             Allergies   Allergies   Allergen Reactions    Levaquin Difficulty breathing    Atorvastatin Calcium      Other reaction(s): Cramps   lipitor    Cats     Clopidogrel  Bisulfate     Dogs     Hctz [Hydrochlorothiazide]      Rash on legs    Levofloxacin     Sulfasalazine Other (See Comments)     Stomach cramps
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
## 2024-04-24 ENCOUNTER — TELEPHONE (OUTPATIENT)
Dept: INTERNAL MEDICINE CLINIC | Age: 69
End: 2024-04-24

## 2024-04-24 NOTE — TELEPHONE ENCOUNTER
Called pts niece (alternate contact) to discuss paperwork and resources for pt. She did not answer. Left voicemail

## 2024-04-29 ENCOUNTER — OFFICE VISIT (OUTPATIENT)
Dept: INTERNAL MEDICINE CLINIC | Age: 69
End: 2024-04-29
Payer: COMMERCIAL

## 2024-04-29 VITALS
DIASTOLIC BLOOD PRESSURE: 68 MMHG | SYSTOLIC BLOOD PRESSURE: 122 MMHG | OXYGEN SATURATION: 97 % | BODY MASS INDEX: 39.62 KG/M2 | HEIGHT: 60 IN | HEART RATE: 79 BPM | WEIGHT: 201.8 LBS

## 2024-04-29 DIAGNOSIS — Z60.3 LANGUAGE BARRIER: ICD-10-CM

## 2024-04-29 DIAGNOSIS — M79.622 LEFT UPPER ARM PAIN: ICD-10-CM

## 2024-04-29 DIAGNOSIS — Z75.8 LANGUAGE BARRIER: ICD-10-CM

## 2024-04-29 DIAGNOSIS — L02.212 ABSCESS OF LOWER BACK: ICD-10-CM

## 2024-04-29 DIAGNOSIS — M25.511 CHRONIC RIGHT SHOULDER PAIN: Primary | ICD-10-CM
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
## 2024-04-29 DIAGNOSIS — G89.29 CHRONIC RIGHT SHOULDER PAIN: Primary | ICD-10-CM

## 2024-04-29 PROCEDURE — G8417 CALC BMI ABV UP PARAM F/U: HCPCS | Performed by: NURSE PRACTITIONER

## 2024-04-29 PROCEDURE — G8427 DOCREV CUR MEDS BY ELIG CLIN: HCPCS | Performed by: NURSE PRACTITIONER

## 2024-04-29 PROCEDURE — 1090F PRES/ABSN URINE INCON ASSESS: CPT | Performed by: NURSE PRACTITIONER

## 2024-04-29 PROCEDURE — 99214 OFFICE O/P EST MOD 30 MIN: CPT | Performed by: NURSE PRACTITIONER

## 2024-04-29 PROCEDURE — 1123F ACP DISCUSS/DSCN MKR DOCD: CPT | Performed by: NURSE PRACTITIONER

## 2024-04-29 PROCEDURE — 1036F TOBACCO NON-USER: CPT | Performed by: NURSE PRACTITIONER

## 2024-04-29 PROCEDURE — 3017F COLORECTAL CA SCREEN DOC REV: CPT | Performed by: NURSE PRACTITIONER

## 2024-04-29 PROCEDURE — G8399 PT W/DXA RESULTS DOCUMENT: HCPCS | Performed by: NURSE PRACTITIONER

## 2024-04-29 RX ORDER — SULFAMETHOXAZOLE AND TRIMETHOPRIM 800; 160 MG/1; MG/1
1 TABLET ORAL 2 TIMES DAILY
Qty: 14 TABLET | Refills: 0 | Status: SHIPPED | OUTPATIENT
Start: 2024-04-29 | End: 2024-05-06

## 2024-04-29 SDOH — SOCIAL STABILITY - SOCIAL INSECURITY: ACCULTURATION DIFFICULTY: Z60.3

## 2024-04-29 ASSESSMENT — ENCOUNTER SYMPTOMS
RESPIRATORY NEGATIVE: 1
GASTROINTESTINAL NEGATIVE: 1
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
## 2024-04-30 ENCOUNTER — TELEPHONE (OUTPATIENT)
Dept: INTERNAL MEDICINE CLINIC | Age: 69
End: 2024-04-30

## 2024-05-01 ENCOUNTER — OFFICE VISIT (OUTPATIENT)
Dept: PSYCHOLOGY | Age: 69
End: 2024-05-01
Payer: COMMERCIAL

## 2024-05-01 DIAGNOSIS — F33.1 MODERATE EPISODE OF RECURRENT MAJOR DEPRESSIVE DISORDER (HCC): Primary | ICD-10-CM

## 2024-05-01 PROCEDURE — 1036F TOBACCO NON-USER: CPT | Performed by: PSYCHOLOGIST

## 2024-05-01 PROCEDURE — 90832 PSYTX W PT 30 MINUTES: CPT | Performed by: PSYCHOLOGIST

## 2024-05-01 PROCEDURE — 1123F ACP DISCUSS/DSCN MKR DOCD: CPT | Performed by: PSYCHOLOGIST

## 2024-05-01 NOTE — PATIENT INSTRUCTIONS
7 tipos de descanso    1. Otis físico    La falta de sueño y el exceso de entrenamiento agotan la energía de tu cuerpo. Consigue 2 tipos de descanso físico:  Pasivo:  -Dormir más de 7 horas por noche  -Julia siestas energéticas (según sea necesario)  Activo:  -Estirar  -Recibir un masaje  -Utilizar silla ergonómica + escritorio.    2. Otis mental    El déficit de descanso mental es causado por sobrecargar brasher pensamiento.  -Escribe tus tareas pendientes  -Listas de verificación de referencia (embalaje, comestibles, etc.)  -Crear un ritual de cierre para separar el trabajo y la south.  -Tómate un descanso de la resolución de problemas.  -Meditar    3. Otis social    Evalúa tus relaciones.  -Pasa más tiempo con personas que te dan energía.  -Pasa menos tiempo con personas que te roban la energía.  -Si eres introvertido, reserva tiempo para estar solo.    4. Otis espiritual    Sé parte de algo más diana que tú mismo  -Voluntario  -Trabajar en un trabajo que se sienta impulsado por un propósito.  -Participar en actividades basadas en la fe (si están alineadas con brasher sistema de creencias)    5. Otis sensorial    El individuo moderno está sobreestimulado.  -Tómate un descanso de las redes sociales.  -Desactivar notificaciones (sonidos y alertas visuales)  -Limitar las videoconferencias  -Establecer un ambiente relajante para la noche (música relajante, hector, etc.)    6. Otis emocional    El déficit de descanso emocional ocurre cuando sientes que no puedes ser auténtico.  Ex. Kathryn azafata que siempre debe sonreír, a pesar de los groseros pasajeros.  -Pasa tiempo con personas con las que puedas ser auténtico.  -Intenta hablar con un terapeuta para liberar el trabajo emocional.    7. Otis creativo    Apreciar la belleza en cualquier forma, ya sea natural o creada por el hombre.  Natural:  -Disfrutar de un amanecer o atardecer.  -Salir a caminar por la naturaleza.  Creado por humanos:  -Visitar un
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
## 2024-05-02 LAB
BACTERIA SPEC AEROBE CULT: ABNORMAL
GRAM STN SPEC: ABNORMAL

## 2024-05-21 ENCOUNTER — HOSPITAL ENCOUNTER (OUTPATIENT)
Dept: PHYSICAL THERAPY | Age: 69
Setting detail: THERAPIES SERIES
Discharge: HOME | End: 2024-05-21
Payer: COMMERCIAL

## 2024-05-21 DIAGNOSIS — G89.29 CHRONIC RIGHT SHOULDER PAIN: Primary | ICD-10-CM

## 2024-05-21 DIAGNOSIS — M25.511 CHRONIC RIGHT SHOULDER PAIN: Primary | ICD-10-CM

## 2024-05-21 PROCEDURE — 97161 PT EVAL LOW COMPLEX 20 MIN: CPT

## 2024-05-21 PROCEDURE — 97140 MANUAL THERAPY 1/> REGIONS: CPT

## 2024-05-21 NOTE — PLAN OF CARE
[facility] - Outpatient Rehabilitation and Therapy [address], [place] [address] office: [phone] fax: [phone]     Physical Therapy Initial Evaluation Certification      Dear Solomon Balderrama APRN*,    We had the pleasure of evaluating the following patient for physical therapy services at [facility].  A summary of our findings can be found in the initial assessment below.  This includes our plan of care.  If you have any questions or concerns regarding these findings, please do not hesitate to contact me at the office phone number listed above.  Thank you for the referral.     Physician Signature:_______________________________Date:__________________  By signing above (or electronic signature), therapist’s plan is approved by physician       Physical Therapy: TREATMENT/PROGRESS NOTE   Patient: Leticia Eid (68 y.o. female)   Examination Date: 2024   :  1955 MRN: [id number]   Visit #: 1   Insurance Allowable Auth Needed   20 PCY []Yes    [x]No    Insurance: Payor: UNITED HEALTHCARE / Plan: UNITED HEALTHCARE / Product Type: *No Product type* /   Insurance ID: [id number] - (Commercial)  Secondary Insurance (if applicable):    Treatment Diagnosis:     ICD-10-CM    1. Chronic right shoulder pain  M25.511     G89.29          Medical Diagnosis:  Chronic right shoulder pain [M25.511, G89.29]   Referring Physician: Solomon Balderrama APRN*  PCP: Solomon Balderrama APRN - CNP     Plan of care signed (Y/N):     Date of Patient follow up with Physician:      Progress Report/POC: EVAL today  POC update due: (10 visits /OR AUTH LIMITS, whichever is less)  2024                                             Precautions/ Contra-indications:           Latex allergy:  NO  Pacemaker:    NO  Contraindications for Manipulation: None  Date of Surgery: n/a  Other:    Red Flags:  None    C-SSRS Triggered by Intake questionnaire:   Patient answered 'NO'

## 2024-05-22 ENCOUNTER — OFFICE VISIT (OUTPATIENT)
Dept: PSYCHOLOGY | Age: 69
End: 2024-05-22
Payer: COMMERCIAL

## 2024-05-22 DIAGNOSIS — F33.1 MODERATE EPISODE OF RECURRENT MAJOR DEPRESSIVE DISORDER (HCC): Primary | ICD-10-CM

## 2024-05-22 PROCEDURE — 90832 PSYTX W PT 30 MINUTES: CPT | Performed by: PSYCHOLOGIST

## 2024-05-22 PROCEDURE — 1123F ACP DISCUSS/DSCN MKR DOCD: CPT | Performed by: PSYCHOLOGIST

## 2024-05-22 PROCEDURE — 1036F TOBACCO NON-USER: CPT | Performed by: PSYCHOLOGIST

## 2024-05-22 NOTE — PROGRESS NOTES
Behavioral Health Consultation  TWIN Reid M.A.  Psychology Practicum Student  Supervised by Leti Souza, Ph.D.  5/22/2024  4:26 PM      Time spent with Patient: 30 minutes  This is patient's sixth [facility] appointment.    Reason for Consult:  depression  Referring Provider: DUANE Roa-CNP     S:  Pt seen for f/u of depression. Pt has struggled w pain. Pt fell at work last week and has had more arm pain. Pt had first physical therapy appointment yesterday, is scheduled to have 5 sessions. Pt expressed pain increases when she gets home and attempts to rest. Pts pain is making sleep more challenging. Discussed pain gate. Pt expressed overeating due to stress. Practiced breathing exercise. Pt will do the breathing exercise when she gets home from work/during evenings. Discussed transition to new provider.        O:  MSE:    Appearance: good hygiene   Attitude: cooperative and friendly  Consciousness: alert  Orientation: oriented to person, place, time, general circumstance  Memory: recent and remote memory intact  Attention/Concentration: intact during session  Psychomotor Activity:normal  Eye Contact: normal  Speech: normal rate and volume, well-articulated  Mood: normal  Affect: euthymic  Perception: within normal limits  Thought Content: within normal limits  Thought Process: logical, coherent and goal-directed  Insight: good  Judgment: intact  Ability to understand instructions: Yes  Ability to respond meaningfully: Yes  Morbid Ideation: no   Suicide Assessment: no suicidal ideation, plan, or intent  Homicidal Ideation: no      History:    Medications:   Current Outpatient Medications   Medication Sig Dispense Refill    DULoxetine (CYMBALTA) 30 MG extended release capsule Take 1 capsule by mouth daily 30 capsule 5     No current facility-administered medications for this visit.       Social History:   Social History     Socioeconomic History    Marital status:      Spouse name: Not on file

## 2024-05-22 NOTE — PATIENT INSTRUCTIONS
automáticamente al cerebro. Más dixie, existe dentro de la médula harris un mecanismo de nemesio que determina el yoel en que las señales de dolor se transmiten al cerebro. Cuando la nemesio está abierta de par en par, llegan más señales de dolor que cuando está cerrada. Generalmente, en lugar de estar completamente abierta o cerrada, la nemesio está abierta en distintos grados.     Factores que abren la nemesio del dolor    Factores físicos  Alcance de la lesión  Preparación del sistema nervioso para enviar señales de dolor.  Nivel de actividad inadecuado    Estrés emocional  Depresión  Preocuparse  Ansiedad  Tensión  Enojo    Factores mentales  Centrándose en el dolor  Aburrimiento debido a la mínima participación en las actividades de la south.  Actitudes no adaptativas    Factores que cierran la nemesio del dolor    Factores físicos  Aplicación de calor o frío  Masaje  Nivel de actividad apropiado    Factores emocionales  Evitar emociones excesivas  Emociones positivas  Manejando el estrés    Factores mentales  Distracción lejos del dolor  Aumento de actividades sociales  Actitud positiva    Comportamiento  Ejercicio apropiado  Aumento de actividades de south positivas  Alimentación saludable  Abstenerse de hábitos poco saludables

## 2024-05-30 ENCOUNTER — HOSPITAL ENCOUNTER (OUTPATIENT)
Dept: PHYSICAL THERAPY | Age: 69
Setting detail: THERAPIES SERIES
Discharge: HOME | End: 2024-05-30
Payer: COMMERCIAL

## 2024-05-30 PROCEDURE — 97110 THERAPEUTIC EXERCISES: CPT | Performed by: SPECIALIST/TECHNOLOGIST

## 2024-05-30 PROCEDURE — 97140 MANUAL THERAPY 1/> REGIONS: CPT | Performed by: SPECIALIST/TECHNOLOGIST

## 2024-05-30 NOTE — FLOWSHEET NOTE
[facility] - Outpatient Rehabilitation and Therapy [address], [place] [address] office: [phone] fax: [phone]      Physical Therapy: TREATMENT/PROGRESS NOTE   Patient: Leticia Eid (68 y.o. female)   Examination Date: 2024   :  1955 MRN: [id number]   Visit #: 2   Insurance Allowable Auth Needed   20 PCY []Yes    [x]No    Insurance: Payor: UNITED HEALTHCARE / Plan: UNITED HEALTHCARE / Product Type: *No Product type* /   Insurance ID: [id number] - (Commercial)  Secondary Insurance (if applicable):    Treatment Diagnosis:     ICD-10-CM    1. Chronic right shoulder pain  M25.511     G89.29          Medical Diagnosis:  Chronic right shoulder pain [M25.511, G89.29]   Referring Physician: Solomon Balderrama APRN*  PCP: Solomon Balderrama APRN - CNP     Plan of care signed (Y/N):     Date of Patient follow up with Physician:      Progress Report/POC:  no  POC update due: (10 visits /OR AUTH LIMITS, whichever is less)  2024                                             Precautions/ Contra-indications:           Latex allergy:  NO  Pacemaker:    NO  Contraindications for Manipulation: None  Date of Surgery: n/a  Other:    Red Flags:  None    C-SSRS Triggered by Intake questionnaire:   Patient answered 'NO' to both behavioral questions on intake.  No further screening warranted    Preferred Language for Healthcare:   [x] English       [] other:    SUBJECTIVE EXAMINATION    Uzbek Interpretor:  Rios [id number]  Patient stated complaint: Pt is having pain across the anterior RT shoulder and notes more pain after work later in the evening. Pts arm is ok when resting. Opening the door seems to bother her arms with burning symptoms .      Test used Initial score  2024   Pain Summary VAS 9    Functional questionnaire Quick DASH 47 / 81%    Other:              Pain:  Pain location: R shoulder  Patient describes pain to be dull  Pain decreases with:  medication

## 2024-06-01 ENCOUNTER — HOSPITAL ENCOUNTER (OUTPATIENT)
Dept: PHYSICAL THERAPY | Age: 69
Setting detail: THERAPIES SERIES
Discharge: HOME | End: 2024-06-01
Payer: COMMERCIAL

## 2024-06-01 PROCEDURE — 97110 THERAPEUTIC EXERCISES: CPT

## 2024-06-01 PROCEDURE — 97140 MANUAL THERAPY 1/> REGIONS: CPT

## 2024-06-01 PROCEDURE — 97530 THERAPEUTIC ACTIVITIES: CPT

## 2024-06-01 NOTE — FLOWSHEET NOTE
progress  [] Patient is not progressing as expected and requires additional follow up with physician  [] Other:     TREATMENT PLAN     Frequency/Duration: 2x/week for 4-6 weeks for the following treatment interventions:    Interventions:  Therapeutic Exercise (47817) including: strength training, ROM, and functional mobility  Therapeutic Activities (31561) including: functional mobility training and education.  Neuromuscular Re-education (03591) activation and proprioception, including postural re-education.    Manual Therapy (37364) as indicated to include: Passive Range of Motion, Gr I-IV mobilizations, Soft Tissue Mobilization, Trigger Point Release, and Myofascial Release  Patient education on postural re-education, activity modification, and progression of HEP    Plan: POC initiated as per evaluation    Electronically Signed by Tana Goff, PTA [id number]  Date: 06/01/2024     Note: Portions of this note have been templated and/or copied from initial evaluation, reassessments and prior notes for documentation efficiency.    Note: If patient does not return for scheduled/recommended follow up visits, this note will serve as a discharge from care along with the most recent update on progress.

## 2024-06-03 ENCOUNTER — HOSPITAL ENCOUNTER (OUTPATIENT)
Dept: PHYSICAL THERAPY | Age: 69
Setting detail: THERAPIES SERIES
Discharge: HOME | End: 2024-06-03
Payer: COMMERCIAL

## 2024-06-03 PROCEDURE — 97140 MANUAL THERAPY 1/> REGIONS: CPT

## 2024-06-03 PROCEDURE — 97110 THERAPEUTIC EXERCISES: CPT

## 2024-06-03 PROCEDURE — 97530 THERAPEUTIC ACTIVITIES: CPT

## 2024-06-03 NOTE — FLOWSHEET NOTE
increasing ROM, reducing/eliminating soft tissue swelling/inflammation/restriction, improving soft tissue extensibility and allowing for proper ROM for normal function with self care, mobility, lifting and ambulation    GOALS     Patient stated goal: decrease pain and return to work pain-free  [] Progressing: [] Met: [] Not Met: [] Adjusted    Therapist goals for Patient:   Short Term Goals: To be achieved in: 2 weeks  1. Independent in HEP (performing at least 5x/week) and progression per patient tolerance, in order to prevent re-injury.   [] Progressing: [] Met: [] Not Met: [] Adjusted  2. Patient will have a decrease in pain to <3/10 to facilitate improvement in movement, function, and ADLs as indicated by Functional Deficits.  [] Progressing: [] Met: [] Not Met: [] Adjusted    Long Term Goals: To be achieved in: 8 weeks  1. Disability index score of 10% or less for the Quick DASH to assist with reaching prior level of function with activities such as cleaning, working.  [] Progressing: [] Met: [] Not Met: [] Adjusted  2. 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   [] Progressing: [] Met: [] Not Met: [] Adjusted  3. Patient will demonstrate increased Strength of R shoulder in all planes to at least 4/5 throughout without pain to allow for proper functional mobility to enable patient to return to working safely.   [] Progressing: [] Met: [] Not Met: [] Adjusted      Overall Progression Towards Functional goals/ Treatment Progress Update:  [] Patient is progressing as expected towards functional goals listed.    [] Progression is slowed due to complexities/Impairments listed.  [] Progression has been slowed due to co-morbidities.  [x] Plan just implemented, too soon (<30days) to assess goals

## 2024-06-05 ENCOUNTER — HOSPITAL ENCOUNTER (EMERGENCY)
Age: 69
Discharge: HOME | End: 2024-06-05
Attending: EMERGENCY MEDICINE
Payer: COMMERCIAL

## 2024-06-05 ENCOUNTER — OFFICE VISIT (OUTPATIENT)
Dept: PSYCHOLOGY | Age: 69
End: 2024-06-05
Payer: COMMERCIAL

## 2024-06-05 ENCOUNTER — APPOINTMENT (OUTPATIENT)
Dept: CT IMAGING | Age: 69
End: 2024-06-05
Payer: COMMERCIAL

## 2024-06-05 VITALS
WEIGHT: 205.4 LBS | TEMPERATURE: 98.3 F | DIASTOLIC BLOOD PRESSURE: 69 MMHG | RESPIRATION RATE: 18 BRPM | SYSTOLIC BLOOD PRESSURE: 133 MMHG | BODY MASS INDEX: 40.11 KG/M2 | OXYGEN SATURATION: 100 % | HEART RATE: 67 BPM

## 2024-06-05 DIAGNOSIS — M25.511 CHRONIC RIGHT SHOULDER PAIN: Primary | ICD-10-CM

## 2024-06-05 DIAGNOSIS — G89.29 CHRONIC RIGHT SHOULDER PAIN: Primary | ICD-10-CM

## 2024-06-05 DIAGNOSIS — M54.2 NECK PAIN: ICD-10-CM

## 2024-06-05 DIAGNOSIS — F33.1 MODERATE EPISODE OF RECURRENT MAJOR DEPRESSIVE DISORDER (HCC): Primary | ICD-10-CM

## 2024-06-05 PROCEDURE — 99284 EMERGENCY DEPT VISIT MOD MDM: CPT

## 2024-06-05 PROCEDURE — 72125 CT NECK SPINE W/O DYE: CPT

## 2024-06-05 PROCEDURE — 90832 PSYTX W PT 30 MINUTES: CPT | Performed by: PSYCHOLOGIST

## 2024-06-05 PROCEDURE — 1036F TOBACCO NON-USER: CPT | Performed by: PSYCHOLOGIST

## 2024-06-05 PROCEDURE — 6370000000 HC RX 637 (ALT 250 FOR IP): Performed by: EMERGENCY MEDICINE

## 2024-06-05 PROCEDURE — 1123F ACP DISCUSS/DSCN MKR DOCD: CPT | Performed by: PSYCHOLOGIST

## 2024-06-05 RX ORDER — CYCLOBENZAPRINE HCL 10 MG
10 TABLET ORAL ONCE
Status: COMPLETED | OUTPATIENT
Start: 2024-06-05 | End: 2024-06-05

## 2024-06-05 RX ORDER — CYCLOBENZAPRINE HCL 10 MG
10 TABLET ORAL 3 TIMES DAILY PRN
Qty: 20 TABLET | Refills: 0 | Status: SHIPPED | OUTPATIENT
Start: 2024-06-05 | End: 2024-06-15

## 2024-06-05 RX ORDER — METHYLPREDNISOLONE 4 MG/1
TABLET ORAL
Qty: 1 KIT | Refills: 0 | Status: SHIPPED | OUTPATIENT
Start: 2024-06-05

## 2024-06-05 RX ADMIN — CYCLOBENZAPRINE 10 MG: 10 TABLET, FILM COATED ORAL at 09:11

## 2024-06-05 ASSESSMENT — PAIN SCALES - GENERAL
PAINLEVEL_OUTOF10: 10
PAINLEVEL_OUTOF10: 10

## 2024-06-05 ASSESSMENT — PAIN - FUNCTIONAL ASSESSMENT: PAIN_FUNCTIONAL_ASSESSMENT: 0-10

## 2024-06-05 NOTE — ED PROVIDER NOTES
in stable condition.    I have discussed the findings of today's workup with the patient and addressed the patient's questions and concerns.  Important warning signs as well as new or worsening symptoms which would necessitate immediate return to the ED were discussed.  The plan is to discharge from the ED at this time, and the patient is in stable condition.  The patient acknowledged understanding is agreeable with this plan.    Patient was given scripts for the following medications. I counseled patient how to take these medications.   Discharge Medication List as of 6/5/2024 10:10 AM        START taking these medications    Details   cyclobenzaprine (FLEXERIL) 10 MG tablet Take 1 tablet by mouth 3 times daily as needed for Muscle spasms (CAUTION: Can cause dizziness, don't drive while taking.), Disp-20 tablet, R-0Normal      methylPREDNISolone (MEDROL, FINA,) 4 MG tablet Take by mouth., Disp-1 kit, R-0Normal             Follow-up with:  Solomon Balderrama, APRN - CNP  [address] [address]  [phone]    Schedule an appointment as soon as possible for a visit in 1 week  For symptom re-evaluation    Jin Cabello MD  [address] [facility]  [address]  [place] [address]  [phone]    Schedule an appointment as soon as possible for a visit in 1 week  For symptom re-evaluation    [facility]  [address] [address]  [phone]  Go to   For symptom re-evaluation, If symptoms worsen      Critical Care Time:  None    DISCLAIMER: This chart was created using Dragon dictation software.  Efforts were made by me to ensure accuracy, however some errors may be present due to limitations of this technology and occasionally words are not transcribed correctly.        Flaco Chase MD  06/05/24 [id number]

## 2024-06-05 NOTE — PROGRESS NOTES
Total Score Depression Severity: 1-4 = Minimal depression, 5-9 = Mild depression, 10-14 = Moderate depression, 15-19 = Moderately severe depression, 20-27 = Severe depression         No data to display              Interpretation of CLAUDIA-7 score: 5-9 = mild anxiety, 10-14 = moderate anxiety, 15+ = severe anxiety. Recommend referral to behavioral health for scores 10 or greater.    Diagnosis:    Major depressive disorder; moderate, recurrent    Plan:  Pt interventions:  Supportive techniques, Emphasized self-care as important for managing overall health, and Emphasized importance of regular practice of relaxation strategies to target / promote reduced tension

## 2024-06-05 NOTE — PATIENT INSTRUCTIONS
Relajación Muscular Progresiva (8 Grupos Musculares)           Para cada renny de músculos, tense los músculos involucrados aproximadamente entre 1/3 y 2/3 de la tensión máxima posible (suficiente para sentir tensión lew no dolor).  Mantén la posición tensa liza unos 4 segundos y luego tye que los músculos se relajen en alistair posiciones naturales de descanso liza unos 40 segundos.      1. Ambos brazos: levante las kati de las abel y luego cierre el puño.  Lleva los puños hasta los hombros mientras tensas los bíceps.  indefinido   2. Ambas piernas: levante ambas piernas del suelo, estire las rodillas y apunte los dedos de los pies hacia la rodrigue.  indefinido   3. Abdomen: Aprieta estos músculos cara si estuvieras a punto de recibir un golpe en el estómago.  indefinido   4. Pecho: Respire muy profundamente (a través de la parte superior del pecho, no del diafragma) y manténgalo así.  indefinido   5. Hombros: Levante ambos hombros hacia las orejas.  indefinido   6. Parte posterior del wilner: [place] y baje la barbilla hacia el pecho.  indefinido   7. Ojos: entrecerrar los ojos.   indefinido   8. Frente: Levanta las sejal.      Progressive Muscle Relaxation (8 Muscle Groups)       For each muscle group, tense the muscles involved about 1/3 to 2/3 of the maximum tension possible (enough to feel tension but not any pain).  Hold in the tensed position for about 4 seconds, then let the muscles relax in their natural resting positions for about 40 seconds.      Both Arms:  Turn your palms up, then make a fist.  Bring your fists up to your shoulders while tensing the biceps.    Both Legs:  Lift both legs off of the ground, straighten your knees, and point your toes toward your head.    Abdomen:  Tighten these muscles as if you were about to be hit in the stomach.    Chest:  Take a very deep breath (through your upper chest, not your diaphragm) and hold it.    Shoulders:  Lift both shoulders up toward your

## 2024-06-06 ENCOUNTER — OFFICE VISIT (OUTPATIENT)
Dept: INTERNAL MEDICINE CLINIC | Age: 69
End: 2024-06-06
Payer: COMMERCIAL

## 2024-06-06 ENCOUNTER — APPOINTMENT (OUTPATIENT)
Dept: PHYSICAL THERAPY | Age: 69
End: 2024-06-06
Payer: COMMERCIAL

## 2024-06-06 VITALS
DIASTOLIC BLOOD PRESSURE: 78 MMHG | HEART RATE: 80 BPM | BODY MASS INDEX: 40.76 KG/M2 | SYSTOLIC BLOOD PRESSURE: 124 MMHG | WEIGHT: 207.6 LBS | OXYGEN SATURATION: 96 % | HEIGHT: 60 IN

## 2024-06-06 DIAGNOSIS — M25.511 ACUTE PAIN OF RIGHT SHOULDER: Primary | ICD-10-CM

## 2024-06-06 DIAGNOSIS — M54.12 CERVICAL RADICULOPATHY: ICD-10-CM

## 2024-06-06 PROCEDURE — 1036F TOBACCO NON-USER: CPT | Performed by: INTERNAL MEDICINE

## 2024-06-06 PROCEDURE — G8399 PT W/DXA RESULTS DOCUMENT: HCPCS | Performed by: INTERNAL MEDICINE

## 2024-06-06 PROCEDURE — 1123F ACP DISCUSS/DSCN MKR DOCD: CPT | Performed by: INTERNAL MEDICINE

## 2024-06-06 PROCEDURE — 1090F PRES/ABSN URINE INCON ASSESS: CPT | Performed by: INTERNAL MEDICINE

## 2024-06-06 PROCEDURE — 99214 OFFICE O/P EST MOD 30 MIN: CPT | Performed by: INTERNAL MEDICINE

## 2024-06-06 PROCEDURE — 3017F COLORECTAL CA SCREEN DOC REV: CPT | Performed by: INTERNAL MEDICINE

## 2024-06-06 PROCEDURE — G8417 CALC BMI ABV UP PARAM F/U: HCPCS | Performed by: INTERNAL MEDICINE

## 2024-06-06 PROCEDURE — G8427 DOCREV CUR MEDS BY ELIG CLIN: HCPCS | Performed by: INTERNAL MEDICINE

## 2024-06-06 NOTE — PROGRESS NOTES
Leticia Eid (:  1955) is a 68 y.o. female,Established patient, here for evaluation of the following chief complaint(s):  Follow-up (ED  )      Assessment & Plan   ASSESSMENT/PLAN:  1. Acute pain of right shoulder  -     MRI SHOULDER RIGHT WO CONTRAST; Future  2. Cervical radiculopathy  -     EMG; Future  Patient continues to have significant pain in her right shoulder at this point she is to continue with the Medrol Dosepak and the muscle relaxant patient is to do the MRI of the shoulder    Patient does have some features that are suggestive of radicular pain and an EMG would be done for further evaluation and then once those 2 tests are done we will see the patient in the  Return if symptoms worsen or fail to improve.         Subjective   SUBJECTIVE/OBJECTIVE:    Lab Review   Lab Results   Component Value Date/Time     2024 06:20 AM     2023 02:43 PM     2022 10:34 PM    K 4.4 2024 06:20 AM    K 5.1 2023 02:43 PM    K 4.2 2022 10:34 PM    K 4.9 2022 07:48 PM    K 4.4 2021 04:53 AM    K 4.7 2021 03:19 AM    CO2 24 2024 06:20 AM    CO2 30 2023 02:43 PM    CO2 27 2022 10:34 PM    BUN 12 2024 06:20 AM    BUN 14 2023 02:43 PM    BUN 19 2022 10:34 PM    CREATININE 0.8 2024 06:20 AM    CREATININE 0.9 2023 02:43 PM    CREATININE 0.6 2022 10:34 PM    GLUCOSE 99 2024 06:20 AM    GLUCOSE 92 2023 02:43 PM    GLUCOSE 96 2022 10:34 PM    CALCIUM 9.2 2024 06:20 AM    CALCIUM 9.7 2023 02:43 PM    CALCIUM 8.9 2022 10:34 PM     Lab Results   Component Value Date/Time    WBC 6.9 2024 03:49 PM    WBC 7.2 2022 10:34 PM    WBC 6.0 2022 07:48 PM    HGB 11.9 2024 03:49 PM    HGB 13.5 2024 06:20 AM    HGB 12.0 2022 10:34 PM    HCT 36.2 2024 03:49 PM    HCT 40.9 2024 06:20 AM    HCT 37.4 2022 10:34 PM    MCV 84.9

## 2024-06-08 ENCOUNTER — APPOINTMENT (OUTPATIENT)
Dept: PHYSICAL THERAPY | Age: 69
End: 2024-06-08
Payer: COMMERCIAL

## 2024-06-10 ENCOUNTER — APPOINTMENT (OUTPATIENT)
Dept: PHYSICAL THERAPY | Age: 69
End: 2024-06-10
Payer: COMMERCIAL

## 2024-06-13 ENCOUNTER — APPOINTMENT (OUTPATIENT)
Dept: PHYSICAL THERAPY | Age: 69
End: 2024-06-13
Payer: COMMERCIAL

## 2024-06-15 ENCOUNTER — APPOINTMENT (OUTPATIENT)
Dept: PHYSICAL THERAPY | Age: 69
End: 2024-06-15
Payer: COMMERCIAL

## 2024-06-17 ENCOUNTER — APPOINTMENT (OUTPATIENT)
Dept: PHYSICAL THERAPY | Age: 69
End: 2024-06-17
Payer: COMMERCIAL

## 2024-06-22 ENCOUNTER — APPOINTMENT (OUTPATIENT)
Dept: PHYSICAL THERAPY | Age: 69
End: 2024-06-22
Payer: COMMERCIAL

## 2024-06-25 ENCOUNTER — OFFICE VISIT (OUTPATIENT)
Dept: ORTHOPEDIC SURGERY | Age: 69
End: 2024-06-25
Payer: COMMERCIAL

## 2024-06-25 VITALS — BODY MASS INDEX: 40.64 KG/M2 | HEIGHT: 60 IN | WEIGHT: 207 LBS

## 2024-06-25 DIAGNOSIS — M25.511 RIGHT SHOULDER PAIN, UNSPECIFIED CHRONICITY: Primary | ICD-10-CM

## 2024-06-25 PROCEDURE — 3017F COLORECTAL CA SCREEN DOC REV: CPT | Performed by: ORTHOPAEDIC SURGERY

## 2024-06-25 PROCEDURE — G8399 PT W/DXA RESULTS DOCUMENT: HCPCS | Performed by: ORTHOPAEDIC SURGERY

## 2024-06-25 PROCEDURE — 1036F TOBACCO NON-USER: CPT | Performed by: ORTHOPAEDIC SURGERY

## 2024-06-25 PROCEDURE — G8427 DOCREV CUR MEDS BY ELIG CLIN: HCPCS | Performed by: ORTHOPAEDIC SURGERY

## 2024-06-25 PROCEDURE — 1123F ACP DISCUSS/DSCN MKR DOCD: CPT | Performed by: ORTHOPAEDIC SURGERY

## 2024-06-25 PROCEDURE — G8417 CALC BMI ABV UP PARAM F/U: HCPCS | Performed by: ORTHOPAEDIC SURGERY

## 2024-06-25 PROCEDURE — 99204 OFFICE O/P NEW MOD 45 MIN: CPT | Performed by: ORTHOPAEDIC SURGERY

## 2024-06-25 PROCEDURE — 20610 DRAIN/INJ JOINT/BURSA W/O US: CPT | Performed by: ORTHOPAEDIC SURGERY

## 2024-06-25 PROCEDURE — 1090F PRES/ABSN URINE INCON ASSESS: CPT | Performed by: ORTHOPAEDIC SURGERY

## 2024-06-25 RX ORDER — BUPIVACAINE HYDROCHLORIDE 5 MG/ML
4 INJECTION, SOLUTION PERINEURAL ONCE
Status: COMPLETED | OUTPATIENT
Start: 2024-06-25 | End: 2024-06-25

## 2024-06-25 RX ORDER — METHYLPREDNISOLONE ACETATE 40 MG/ML
40 INJECTION, SUSPENSION INTRA-ARTICULAR; INTRALESIONAL; INTRAMUSCULAR; SOFT TISSUE ONCE
Status: COMPLETED | OUTPATIENT
Start: 2024-06-25 | End: 2024-06-25

## 2024-06-25 RX ADMIN — BUPIVACAINE HYDROCHLORIDE 20 MG: 5 INJECTION, SOLUTION PERINEURAL at 16:52

## 2024-06-25 RX ADMIN — METHYLPREDNISOLONE ACETATE 40 MG: 40 INJECTION, SUSPENSION INTRA-ARTICULAR; INTRALESIONAL; INTRAMUSCULAR; SOFT TISSUE at 16:51

## 2024-06-26 NOTE — PROGRESS NOTES
reviewing, and counseling on next steps.  Lastly, time was spent discussing orders with my staff as well as providing documentation in the chart.                    Gerald Matt MD            Orthopaedic Surgery Sports Medicine and Arthroscopy            [facility]            Team Physician [facility] [place] ([place])      Disclaimer:  This note was dictated with voice recognition software.  Though review and correction are routine, we apologize for any errors.

## 2024-07-31 DIAGNOSIS — M50.20 CERVICAL HERNIATION: ICD-10-CM

## 2024-07-31 DIAGNOSIS — M54.12 CERVICAL RADICULOPATHY: ICD-10-CM

## 2024-07-31 DIAGNOSIS — F33.1 MODERATE EPISODE OF RECURRENT MAJOR DEPRESSIVE DISORDER (HCC): ICD-10-CM

## 2024-07-31 RX ORDER — DULOXETIN HYDROCHLORIDE 30 MG/1
30 CAPSULE, DELAYED RELEASE ORAL DAILY
Qty: 30 CAPSULE | Refills: 5 | Status: SHIPPED | OUTPATIENT
Start: 2024-07-31

## 2024-09-08 DIAGNOSIS — F33.1 MODERATE EPISODE OF RECURRENT MAJOR DEPRESSIVE DISORDER (HCC): ICD-10-CM

## 2024-09-08 DIAGNOSIS — M50.20 CERVICAL HERNIATION: ICD-10-CM

## 2024-09-08 DIAGNOSIS — M54.12 CERVICAL RADICULOPATHY: ICD-10-CM

## 2024-09-09 RX ORDER — DULOXETIN HYDROCHLORIDE 30 MG/1
30 CAPSULE, DELAYED RELEASE ORAL DAILY
Qty: 90 CAPSULE | Refills: 1 | Status: SHIPPED | OUTPATIENT
Start: 2024-09-09

## 2025-01-03 ENCOUNTER — HOSPITAL ENCOUNTER (OUTPATIENT)
Dept: GENERAL RADIOLOGY | Age: 70
Discharge: HOME | End: 2025-01-03
Payer: COMMERCIAL

## 2025-01-03 ENCOUNTER — HOSPITAL ENCOUNTER (OUTPATIENT)
Age: 70
Discharge: HOME | End: 2025-01-03
Payer: COMMERCIAL

## 2025-01-03 ENCOUNTER — OFFICE VISIT (OUTPATIENT)
Dept: INTERNAL MEDICINE CLINIC | Age: 70
End: 2025-01-03

## 2025-01-03 VITALS
DIASTOLIC BLOOD PRESSURE: 76 MMHG | HEART RATE: 66 BPM | BODY MASS INDEX: 43.07 KG/M2 | WEIGHT: 219.4 LBS | HEIGHT: 60 IN | OXYGEN SATURATION: 98 % | SYSTOLIC BLOOD PRESSURE: 126 MMHG | TEMPERATURE: 97.3 F
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
## 2025-01-03 DIAGNOSIS — G89.29 CHRONIC MIDLINE LOW BACK PAIN WITH RIGHT-SIDED SCIATICA: ICD-10-CM
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
## 2025-01-03 DIAGNOSIS — M54.41 CHRONIC MIDLINE LOW BACK PAIN WITH RIGHT-SIDED SCIATICA: ICD-10-CM

## 2025-01-03 PROCEDURE — 73502 X-RAY EXAM HIP UNI 2-3 VIEWS: CPT

## 2025-01-03 PROCEDURE — 72100 X-RAY EXAM L-S SPINE 2/3 VWS: CPT

## 2025-01-03 PROCEDURE — 72072 X-RAY EXAM THORAC SPINE 3VWS: CPT

## 2025-01-03 RX ORDER — ACETAMINOPHEN 500 MG
1000 TABLET ORAL EVERY 6 HOURS PRN
Qty: 112 TABLET | Refills: 0 | Status: SHIPPED | OUTPATIENT
Start: 2025-01-03 | End: 2025-01-17

## 2025-01-03 RX ORDER — BACLOFEN 10 MG/1
5 TABLET ORAL NIGHTLY PRN
Qty: 7 TABLET | Refills: 0 | Status: SHIPPED | OUTPATIENT
Start: 2025-01-03 | End: 2025-01-17

## 2025-01-03 ASSESSMENT — ENCOUNTER SYMPTOMS
BLOOD IN STOOL: 0
VOMITING: 0
CONSTIPATION: 0
SHORTNESS OF BREATH: 0
BACK PAIN: 1
DIARRHEA: 0
CHEST TIGHTNESS: 0
COUGH: 0
NAUSEA: 0
SORE THROAT: 0
WHEEZING: 0
ABDOMINAL PAIN: 0
TROUBLE SWALLOWING: 0

## 2025-01-03 NOTE — PROGRESS NOTES
patient to physical therapy.  Patient provided with prescription for Tylenol and baclofen to use at night.  Patient educated on side effects of baclofen and drowsiness.  Patient understands and agrees with the plan.  No further questions or concerns.    Patient was instructed to watch out for worsening symptoms including but not limited to fevers, chills, lightheadedness, headache, vision changes, shortness of breath, chest pain, nausea, vomiting, bleeding, syncope etc. If their clinical condition worsened, patient was instructed to either call the office or go straight to the ED.  Patient expressed understanding and agrees with the plan.        No follow-ups on file.         Subjective   SUBJECTIVE/OBJECTIVE:    Lab Review   Lab Results   Component Value Date/Time     01/17/2024 06:20 AM     07/18/2023 02:43 PM     12/09/2022 10:34 PM    K 4.4 01/17/2024 06:20 AM    K 5.1 07/18/2023 02:43 PM    K 4.2 12/09/2022 10:34 PM    K 4.9 07/20/2022 07:48 PM    K 4.4 06/23/2021 04:53 AM    K 4.7 06/21/2021 03:19 AM    CO2 24 01/17/2024 06:20 AM    CO2 30 07/18/2023 02:43 PM    CO2 27 12/09/2022 10:34 PM    BUN 12 01/17/2024 06:20 AM    BUN 14 07/18/2023 02:43 PM    BUN 19 12/09/2022 10:34 PM    CREATININE 0.8 01/17/2024 06:20 AM    CREATININE 0.9 07/18/2023 02:43 PM    CREATININE 0.6 12/09/2022 10:34 PM    GLUCOSE 99 01/17/2024 06:20 AM    GLUCOSE 92 07/18/2023 02:43 PM    GLUCOSE 96 12/09/2022 10:34 PM    CALCIUM 9.2 01/17/2024 06:20 AM    CALCIUM 9.7 07/18/2023 02:43 PM    CALCIUM 8.9 12/09/2022 10:34 PM     Lab Results   Component Value Date/Time    WBC 6.9 03/21/2024 03:49 PM    WBC 7.2 12/09/2022 10:34 PM    WBC 6.0 07/20/2022 07:48 PM    HGB 11.9 03/21/2024 03:49 PM    HGB 13.5 01/17/2024 06:20 AM    HGB 12.0 12/09/2022 10:34 PM    HCT 36.2 03/21/2024 03:49 PM    HCT 40.9 01/17/2024 06:20 AM    HCT 37.4 12/09/2022 10:34 PM    MCV 84.9 03/21/2024 03:49 PM    MCV 83.6 12/09/2022 10:34 PM    MCV 84.6

## 2025-01-27 ENCOUNTER — OFFICE VISIT (OUTPATIENT)
Dept: INTERNAL MEDICINE CLINIC | Age: 70
End: 2025-01-27
Payer: COMMERCIAL

## 2025-01-27 VITALS
HEART RATE: 63 BPM | WEIGHT: 212 LBS | DIASTOLIC BLOOD PRESSURE: 84 MMHG | HEIGHT: 60 IN | OXYGEN SATURATION: 97 % | BODY MASS INDEX: 41.62 KG/M2 | SYSTOLIC BLOOD PRESSURE: 128 MMHG

## 2025-01-27 DIAGNOSIS — R42 VERTIGO: ICD-10-CM

## 2025-01-27 DIAGNOSIS — M54.12 CERVICAL RADICULOPATHY: ICD-10-CM

## 2025-01-27 DIAGNOSIS — E04.1 THYROID NODULE INCIDENTALLY NOTED ON IMAGING STUDY: ICD-10-CM

## 2025-01-27 DIAGNOSIS — Z91.81 AT HIGH RISK FOR FALLS: ICD-10-CM

## 2025-01-27 DIAGNOSIS — Z75.8 LANGUAGE BARRIER: ICD-10-CM

## 2025-01-27 DIAGNOSIS — M15.0 PRIMARY OSTEOARTHRITIS INVOLVING MULTIPLE JOINTS: ICD-10-CM

## 2025-01-27 DIAGNOSIS — Z60.3 LANGUAGE BARRIER: ICD-10-CM

## 2025-01-27 DIAGNOSIS — Z00.00 ANNUAL PHYSICAL EXAM: ICD-10-CM

## 2025-01-27 DIAGNOSIS — Z00.00 ANNUAL PHYSICAL EXAM: Primary | ICD-10-CM

## 2025-01-27 DIAGNOSIS — F33.1 MODERATE EPISODE OF RECURRENT MAJOR DEPRESSIVE DISORDER (HCC): ICD-10-CM

## 2025-01-27 LAB
25(OH)D3 SERPL-MCNC: 22 NG/ML
ALBUMIN SERPL-MCNC: 4.2 G/DL (ref 3.4–5)
ALBUMIN/GLOB SERPL: 1.5 {RATIO} (ref 1.1–2.2)
ALP SERPL-CCNC: 103 U/L (ref 40–129)
ALT SERPL-CCNC: 11 U/L (ref 10–40)
ANION GAP SERPL CALCULATED.3IONS-SCNC: 7 MMOL/L (ref 3–16)
AST SERPL-CCNC: 26 U/L (ref 15–37)
BASOPHILS # BLD: 0 K/UL (ref 0–0.2)
BASOPHILS NFR BLD: 0.3 %
BILIRUB SERPL-MCNC: <0.2 MG/DL (ref 0–1)
BUN SERPL-MCNC: 11 MG/DL (ref 7–20)
CALCIUM SERPL-MCNC: 10.1 MG/DL (ref 8.3–10.6)
CHLORIDE SERPL-SCNC: 101 MMOL/L (ref 99–110)
CHOLEST SERPL-MCNC: 201 MG/DL (ref 0–199)
CO2 SERPL-SCNC: 31 MMOL/L (ref 21–32)
CREAT SERPL-MCNC: 0.7 MG/DL (ref 0.6–1.2)
DEPRECATED RDW RBC AUTO: 15 % (ref 12.4–15.4)
EOSINOPHIL # BLD: 0 K/UL (ref 0–0.6)
EOSINOPHIL NFR BLD: 0.9 %
GFR SERPLBLD CREATININE-BSD FMLA CKD-EPI: >90 ML/MIN/{1.73_M2}
GLUCOSE SERPL-MCNC: 83 MG/DL (ref 70–99)
HCT VFR BLD AUTO: 40.8 % (ref 36–48)
HCV AB SERPL QL IA: NORMAL
HDLC SERPL-MCNC: 51 MG/DL (ref 40–60)
HGB BLD-MCNC: 13.4 G/DL (ref 12–16)
LDLC SERPL CALC-MCNC: 128 MG/DL
LYMPHOCYTES # BLD: 2.2 K/UL (ref 1–5.1)
LYMPHOCYTES NFR BLD: 42.7 %
MCH RBC QN AUTO: 27 PG (ref 26–34)
MCHC RBC AUTO-ENTMCNC: 32.8 G/DL (ref 31–36)
MCV RBC AUTO: 82.4 FL (ref 80–100)
MONOCYTES # BLD: 0.5 K/UL (ref 0–1.3)
MONOCYTES NFR BLD: 9.5 %
NEUTROPHILS # BLD: 2.4 K/UL (ref 1.7–7.7)
NEUTROPHILS NFR BLD: 46.6 %
PLATELET # BLD AUTO: 259 K/UL (ref 135–450)
PMV BLD AUTO: 9.5 FL (ref 5–10.5)
POTASSIUM SERPL-SCNC: 5.5 MMOL/L (ref 3.5–5.1)
PROT SERPL-MCNC: 7 G/DL (ref 6.4–8.2)
RBC # BLD AUTO: 4.95 M/UL (ref 4–5.2)
SODIUM SERPL-SCNC: 139 MMOL/L (ref 136–145)
TRIGL SERPL-MCNC: 110 MG/DL (ref 0–150)
TSH SERPL DL<=0.005 MIU/L-ACNC: 1.47 UIU/ML (ref 0.27–4.2)
VLDLC SERPL CALC-MCNC: 22 MG/DL
WBC # BLD AUTO: 5.2 K/UL (ref 4–11)

## 2025-01-27 PROCEDURE — 99397 PER PM REEVAL EST PAT 65+ YR: CPT | Performed by: NURSE PRACTITIONER

## 2025-01-27 RX ORDER — DULOXETIN HYDROCHLORIDE 60 MG/1
60 CAPSULE, DELAYED RELEASE ORAL DAILY
Qty: 90 CAPSULE | Refills: 3 | Status: SHIPPED | OUTPATIENT
Start: 2025-01-27

## 2025-01-27 RX ORDER — HYDROXYZINE HYDROCHLORIDE 25 MG/1
25 TABLET, FILM COATED ORAL 2 TIMES DAILY PRN
Qty: 30 TABLET | Refills: 2 | Status: SHIPPED | OUTPATIENT
Start: 2025-01-27

## 2025-01-27 SDOH — ECONOMIC STABILITY: FOOD INSECURITY: WITHIN THE PAST 12 MONTHS, YOU WORRIED THAT YOUR FOOD WOULD RUN OUT BEFORE YOU GOT MONEY TO BUY MORE.: NEVER TRUE

## 2025-01-27 SDOH — SOCIAL STABILITY - SOCIAL INSECURITY: ACCULTURATION DIFFICULTY: Z60.3

## 2025-01-27 SDOH — ECONOMIC STABILITY: FOOD INSECURITY: WITHIN THE PAST 12 MONTHS, THE FOOD YOU BOUGHT JUST DIDN'T LAST AND YOU DIDN'T HAVE MONEY TO GET MORE.: NEVER TRUE

## 2025-01-27 ASSESSMENT — ENCOUNTER SYMPTOMS
RESPIRATORY NEGATIVE: 1
GASTROINTESTINAL NEGATIVE: 1
SHORTNESS OF BREATH: 0
BACK PAIN: 1

## 2025-01-27 ASSESSMENT — PATIENT HEALTH QUESTIONNAIRE - PHQ9
SUM OF ALL RESPONSES TO PHQ9 QUESTIONS 1 & 2: 0
SUM OF ALL RESPONSES TO PHQ QUESTIONS 1-9: 0
9. THOUGHTS THAT YOU WOULD BE BETTER OFF DEAD, OR OF HURTING YOURSELF: NOT AT ALL
6. FEELING BAD ABOUT YOURSELF - OR THAT YOU ARE A FAILURE OR HAVE LET YOURSELF OR YOUR FAMILY DOWN: NOT AT ALL
8. MOVING OR SPEAKING SO SLOWLY THAT OTHER PEOPLE COULD HAVE NOTICED. OR THE OPPOSITE, BEING SO FIGETY OR RESTLESS THAT YOU HAVE BEEN MOVING AROUND A LOT MORE THAN USUAL: NOT AT ALL
2. FEELING DOWN, DEPRESSED OR HOPELESS: NOT AT ALL
SUM OF ALL RESPONSES TO PHQ QUESTIONS 1-9: 0
4. FEELING TIRED OR HAVING LITTLE ENERGY: NOT AT ALL
SUM OF ALL RESPONSES TO PHQ QUESTIONS 1-9: 0
SUM OF ALL RESPONSES TO PHQ QUESTIONS 1-9: 0
5. POOR APPETITE OR OVEREATING: NOT AT ALL
10. IF YOU CHECKED OFF ANY PROBLEMS, HOW DIFFICULT HAVE THESE PROBLEMS MADE IT FOR YOU TO DO YOUR WORK, TAKE CARE OF THINGS AT HOME, OR GET ALONG WITH OTHER PEOPLE: NOT DIFFICULT AT ALL
3. TROUBLE FALLING OR STAYING ASLEEP: NOT AT ALL
1. LITTLE INTEREST OR PLEASURE IN DOING THINGS: NOT AT ALL
7. TROUBLE CONCENTRATING ON THINGS, SUCH AS READING THE NEWSPAPER OR WATCHING TELEVISION: NOT AT ALL

## 2025-01-27 NOTE — PROGRESS NOTES
SUBJECTIVE:    Patient ID: Leticia Eid is a 69 y.o. female.    CC: Annual exam, dizziness, chronic back pain, depression    HPI: The patient presents the office today for any physical examination and follow-up of chronic medical conditions    Patient recently underwent x-rays which showed degenerative changes in multiple joints and spine.  Patient states she is overall improved from previous.  She is using Tylenol as needed.  She feels duloxetine has been helpful.    She has been having dizziness associated with positional changes.  She notices most when she lays down or when she sits up from lying down.  She reports no associated chest pain or shortness of breath.  She has not passed out.  She feels she drinks enough water.  She has managed this with slow positional changes.    She has a history of depression.  She feels this is stable on duloxetine.        Past Medical History:   Diagnosis Date    Hypertension         Current Outpatient Medications   Medication Sig Dispense Refill    DULoxetine (CYMBALTA) 30 MG extended release capsule TAKE 1 CAPSULE BY MOUTH DAILY 90 capsule 1    acetaminophen (TYLENOL) 500 MG tablet Take 2 tablets by mouth every 6 hours as needed for Pain 112 tablet 0     No current facility-administered medications for this visit.           Review of Systems   Constitutional: Negative.  Negative for chills and fever.   Respiratory: Negative.  Negative for shortness of breath.    Cardiovascular: Negative.  Negative for chest pain.   Gastrointestinal: Negative.    Genitourinary: Negative.    Musculoskeletal:  Positive for arthralgias and back pain.   Skin: Negative.    Neurological:  Positive for dizziness.   Psychiatric/Behavioral: Negative.           OBJECTIVE:  Physical Exam  Vitals reviewed.   Constitutional:       General: She is not in acute distress.     Appearance: She is well-developed. She is not diaphoretic.   HENT:      Head: Normocephalic and atraumatic.   Eyes:      General: No

## 2025-01-28 LAB
EST. AVERAGE GLUCOSE BLD GHB EST-MCNC: 119.8 MG/DL
HBA1C MFR BLD: 5.8 %

## 2025-01-29 DIAGNOSIS — E78.00 HYPERCHOLESTEREMIA: Primary | ICD-10-CM

## 2025-01-29 RX ORDER — ATORVASTATIN CALCIUM 20 MG/1
20 TABLET, FILM COATED ORAL NIGHTLY
Qty: 90 TABLET | Refills: 3 | Status: SHIPPED | OUTPATIENT
Start: 2025-01-29

## 2025-02-05 ENCOUNTER — PATIENT MESSAGE (OUTPATIENT)
Dept: INTERNAL MEDICINE CLINIC | Age: 70
End: 2025-02-05

## 2025-02-24 ENCOUNTER — OFFICE VISIT (OUTPATIENT)
Dept: INTERNAL MEDICINE CLINIC | Age: 70
End: 2025-02-24
Payer: COMMERCIAL

## 2025-02-24 VITALS
WEIGHT: 206.4 LBS | SYSTOLIC BLOOD PRESSURE: 108 MMHG | DIASTOLIC BLOOD PRESSURE: 68 MMHG | HEART RATE: 70 BPM | BODY MASS INDEX: 40.52 KG/M2 | HEIGHT: 60 IN | OXYGEN SATURATION: 99 %

## 2025-02-24 DIAGNOSIS — Z75.8 LANGUAGE BARRIER: ICD-10-CM

## 2025-02-24 DIAGNOSIS — R11.0 NAUSEA: ICD-10-CM

## 2025-02-24 DIAGNOSIS — Z60.3 LANGUAGE BARRIER: ICD-10-CM

## 2025-02-24 DIAGNOSIS — R10.13 DYSPEPSIA: ICD-10-CM

## 2025-02-24 DIAGNOSIS — E87.5 HYPERKALEMIA: ICD-10-CM

## 2025-02-24 DIAGNOSIS — K21.00 GASTROESOPHAGEAL REFLUX DISEASE WITH ESOPHAGITIS WITHOUT HEMORRHAGE: ICD-10-CM

## 2025-02-24 DIAGNOSIS — R10.13 DYSPEPSIA: Primary | ICD-10-CM

## 2025-02-24 PROCEDURE — 1123F ACP DISCUSS/DSCN MKR DOCD: CPT | Performed by: NURSE PRACTITIONER

## 2025-02-24 PROCEDURE — 99214 OFFICE O/P EST MOD 30 MIN: CPT | Performed by: NURSE PRACTITIONER

## 2025-02-24 PROCEDURE — 1090F PRES/ABSN URINE INCON ASSESS: CPT | Performed by: NURSE PRACTITIONER

## 2025-02-24 PROCEDURE — G8417 CALC BMI ABV UP PARAM F/U: HCPCS | Performed by: NURSE PRACTITIONER

## 2025-02-24 PROCEDURE — 3017F COLORECTAL CA SCREEN DOC REV: CPT | Performed by: NURSE PRACTITIONER

## 2025-02-24 PROCEDURE — 1036F TOBACCO NON-USER: CPT | Performed by: NURSE PRACTITIONER

## 2025-02-24 PROCEDURE — G8427 DOCREV CUR MEDS BY ELIG CLIN: HCPCS | Performed by: NURSE PRACTITIONER

## 2025-02-24 PROCEDURE — G8399 PT W/DXA RESULTS DOCUMENT: HCPCS | Performed by: NURSE PRACTITIONER

## 2025-02-24 RX ORDER — PANTOPRAZOLE SODIUM 20 MG/1
20 TABLET, DELAYED RELEASE ORAL 2 TIMES DAILY
Qty: 60 TABLET | Refills: 0 | Status: SHIPPED | OUTPATIENT
Start: 2025-02-24

## 2025-02-24 RX ORDER — PANTOPRAZOLE SODIUM 20 MG/1
20 TABLET, DELAYED RELEASE ORAL 2 TIMES DAILY
Qty: 180 TABLET | OUTPATIENT
Start: 2025-02-24

## 2025-02-24 SDOH — SOCIAL STABILITY - SOCIAL INSECURITY: ACCULTURATION DIFFICULTY: Z60.3

## 2025-02-24 NOTE — PROGRESS NOTES
ASSESSMENT/PLAN:  Leticia was seen today for gastroesophageal reflux, pre-op exam and heartburn.    Diagnoses and all orders for this visit:    Dyspepsia  - Acute on chronic  - Check labs  - Will perform H. pylori test today prior to starting PPI  -     Basic Metabolic Panel; Future  -     H. Pylori Breath Test; Future  -     pantoprazole (PROTONIX) 20 MG tablet; Take 1 tablet by mouth in the morning and at bedtime    Nausea  - As above  -     H. Pylori Breath Test; Future    Gastroesophageal reflux disease with esophagitis without hemorrhage  - As above  -     pantoprazole (PROTONIX) 20 MG tablet; Take 1 tablet by mouth in the morning and at bedtime    Hyperkalemia  - New problem  - Most recent potassium 5.5.  This is up from previous normal reading  - Recheck K+  -     Basic Metabolic Panel; Future    Language barrier  Patient is seen today with language services      DUANE Madrid - CNP

## 2025-02-25 LAB
ANION GAP SERPL CALCULATED.3IONS-SCNC: 10 MMOL/L (ref 3–16)
BUN SERPL-MCNC: 11 MG/DL (ref 7–20)
CALCIUM SERPL-MCNC: 9.8 MG/DL (ref 8.3–10.6)
CHLORIDE SERPL-SCNC: 100 MMOL/L (ref 99–110)
CO2 SERPL-SCNC: 31 MMOL/L (ref 21–32)
CREAT SERPL-MCNC: 0.8 MG/DL (ref 0.6–1.2)
GFR SERPLBLD CREATININE-BSD FMLA CKD-EPI: 80 ML/MIN/{1.73_M2}
GLUCOSE SERPL-MCNC: 83 MG/DL (ref 70–99)
POTASSIUM SERPL-SCNC: 4.7 MMOL/L (ref 3.5–5.1)
SODIUM SERPL-SCNC: 141 MMOL/L (ref 136–145)

## 2025-02-26 LAB — H PYLORI BREATH TEST: NEGATIVE

## 2025-02-27 ENCOUNTER — TELEPHONE (OUTPATIENT)
Dept: INTERNAL MEDICINE CLINIC | Age: 70
End: 2025-02-27

## 2025-02-27 NOTE — TELEPHONE ENCOUNTER
Pt came into office, was given lab results. States she is still having stomach issues. Would like a referral for upper endoscopy. Please advise and call pt- will need .

## 2025-02-28 ENCOUNTER — TELEPHONE (OUTPATIENT)
Dept: INTERNAL MEDICINE CLINIC | Age: 70
End: 2025-02-28

## 2025-02-28 NOTE — TELEPHONE ENCOUNTER
Dolores from [facility] calling in about pre-op status.    Pt was seen for an acute issue only on 2/24/25.    Pt is having right eye pterygium with conjunctival graph topical MAC surgery on 3/5/25 with .    Dolores advised pt will need a pre-op visit.    Please call pt to schedule, thanks.    Dolores may be reached for any questions/concerns at [phone], fax [phone]. She says pt should have the pre-op paperwork.

## 2025-03-03 DIAGNOSIS — R10.13 DYSPEPSIA: Primary | ICD-10-CM

## 2025-03-03 DIAGNOSIS — R11.0 NAUSEA: ICD-10-CM

## 2025-03-03 DIAGNOSIS — K21.00 GASTROESOPHAGEAL REFLUX DISEASE WITH ESOPHAGITIS WITHOUT HEMORRHAGE: ICD-10-CM

## 2025-03-03 ASSESSMENT — ENCOUNTER SYMPTOMS
TROUBLE SWALLOWING: 1
NAUSEA: 1
RESPIRATORY NEGATIVE: 1
DIARRHEA: 0
ANAL BLEEDING: 0
CONSTIPATION: 0
BLOOD IN STOOL: 0
ABDOMINAL PAIN: 1
VOMITING: 0
ABDOMINAL DISTENTION: 1
RECTAL PAIN: 0

## 2025-05-19 ENCOUNTER — OFFICE VISIT (OUTPATIENT)
Dept: INTERNAL MEDICINE CLINIC | Age: 70
End: 2025-05-19
Payer: COMMERCIAL

## 2025-05-19 ENCOUNTER — HOSPITAL ENCOUNTER (OUTPATIENT)
Dept: WOMENS IMAGING | Age: 70
Discharge: HOME | End: 2025-05-19
Payer: COMMERCIAL

## 2025-05-19 VITALS
HEIGHT: 64 IN | SYSTOLIC BLOOD PRESSURE: 136 MMHG | OXYGEN SATURATION: 99 % | HEART RATE: 67 BPM | BODY MASS INDEX: 34.15 KG/M2 | WEIGHT: 200 LBS | DIASTOLIC BLOOD PRESSURE: 88 MMHG

## 2025-05-19 VITALS — WEIGHT: 198 LBS | BODY MASS INDEX: 33.8 KG/M2 | HEIGHT: 64 IN

## 2025-05-19 DIAGNOSIS — M21.619 BUNION OF GREAT TOE: ICD-10-CM

## 2025-05-19 DIAGNOSIS — Z12.31 ENCOUNTER FOR SCREENING MAMMOGRAM FOR BREAST CANCER: ICD-10-CM

## 2025-05-19 DIAGNOSIS — L84 CALLUS BETWEEN TOES: Primary | ICD-10-CM

## 2025-05-19 PROCEDURE — G8417 CALC BMI ABV UP PARAM F/U: HCPCS | Performed by: NURSE PRACTITIONER

## 2025-05-19 PROCEDURE — G8399 PT W/DXA RESULTS DOCUMENT: HCPCS | Performed by: NURSE PRACTITIONER

## 2025-05-19 PROCEDURE — 1090F PRES/ABSN URINE INCON ASSESS: CPT | Performed by: NURSE PRACTITIONER

## 2025-05-19 PROCEDURE — 3017F COLORECTAL CA SCREEN DOC REV: CPT | Performed by: NURSE PRACTITIONER

## 2025-05-19 PROCEDURE — 77063 BREAST TOMOSYNTHESIS BI: CPT

## 2025-05-19 PROCEDURE — 1123F ACP DISCUSS/DSCN MKR DOCD: CPT | Performed by: NURSE PRACTITIONER

## 2025-05-19 PROCEDURE — 99213 OFFICE O/P EST LOW 20 MIN: CPT | Performed by: NURSE PRACTITIONER

## 2025-05-19 PROCEDURE — G8427 DOCREV CUR MEDS BY ELIG CLIN: HCPCS | Performed by: NURSE PRACTITIONER

## 2025-05-19 PROCEDURE — 1036F TOBACCO NON-USER: CPT | Performed by: NURSE PRACTITIONER

## 2025-05-19 RX ORDER — IBUPROFEN 600 MG/1
600 TABLET, FILM COATED ORAL 3 TIMES DAILY PRN
Qty: 30 TABLET | Refills: 0 | Status: SHIPPED | OUTPATIENT
Start: 2025-05-19

## 2025-05-19 NOTE — PROGRESS NOTES
Office Visit   5/19/2025    Assessment and Plan:  1. Callus between toes  Assessment & Plan:  -  acute, new problem  -  patient has severe calluses between 2nd and 3rd toe bilaterally.  - discussed with patient that she will continue to get severe calluses until she corrects her bunions.  Patient states she cannot afford to be off work for surgery.  - refer to podiatry  - recommend callus pads, ibuprofen for discomfort and supportive shoes.  Orders:  -     MAK - Sherry Cam, DPM, Podiatry, [facility]  -     ibuprofen (ADVIL;MOTRIN) 600 MG tablet; Take 1 tablet by mouth 3 times daily as needed for Pain, Disp-30 tablet, R-0Normal  2. Bunion of great toe  Assessment & Plan:  - chronic, uncontrolled  - podiatrist has recommended bunionectomy surgery but patient cannot afford to take off work.         Return if symptoms worsen or fail to improve.     Subjective:  Chief Complaint   Patient presents with    Toe Pain     Bilateral toe pain in second and third toes x more than 1 year (unsure of onset)   Was seen by podiatry late 2024, was told she needed bunion surgery, but she can not take time off work        HPI:   Leticia Eid is a 69 y.o. female who presents to the clinic today for acute visit.  Patient is Mac[place] speaking.  Interview was conducted via certified virtual . Hard historian due to language barrier    Toe pain  - bilateral second and third toe pain   -  chronic but worsening  -  saw podiatrist 2024- told she needed bunionectomy surgery and would need to be off for 4 months.  She states she cannot get surgery because she is working and cannot afford to be off.    - sometimes has swelling in feet which makes it difficult to walk.    Review of Systems   Musculoskeletal:         Bilateral toe pain       No Known Allergies  Current Outpatient Rx   Medication Sig Dispense Refill    ibuprofen (ADVIL;MOTRIN) 600 MG tablet Take 1 tablet by mouth 3 times daily as needed for Pain 30 tablet 0

## 2025-05-27 ENCOUNTER — RESULTS FOLLOW-UP (OUTPATIENT)
Dept: INTERNAL MEDICINE CLINIC | Age: 70
End: 2025-05-27

## 2025-05-27 PROBLEM — M21.619 BUNION OF GREAT TOE: Status: ACTIVE | Noted: 2025-05-27

## 2025-05-27 NOTE — ASSESSMENT & PLAN NOTE
- chronic, uncontrolled  - podiatrist has recommended bunionectomy surgery but patient cannot afford to take off work.

## 2025-05-27 NOTE — ASSESSMENT & PLAN NOTE
-  acute, new problem  -  patient has severe calluses between 2nd and 3rd toe bilaterally.  - discussed with patient that she will continue to get severe calluses until she corrects her bunions.  Patient states she cannot afford to be off work for surgery.  - refer to podiatry  - recommend callus pads, ibuprofen for discomfort and supportive shoes.

## (undated) DEVICE — SET VLV 3 PC AWS DISPOSABLE GRDIAN SCOPEVALET

## (undated) DEVICE — GLOVE ORTHO 7 1/2   MSG9475

## (undated) DEVICE — SET EXTN PRIMING 4.9ML L30IN INCL SLDE CLMP SPIN M LUERLOCK

## (undated) DEVICE — SOLUTION IV IRRIG WATER 500ML POUR BRL ST 2F7113

## (undated) DEVICE — MASC TURNOVER KIT: Brand: MEDLINE INDUSTRIES, INC.

## (undated) DEVICE — PROCEDURE KIT ENDOSCP CUST

## (undated) DEVICE — SET,IRRIGATION,CYSTO,Y-TYPE,90": Brand: MEDLINE

## (undated) DEVICE — FAIRFIELD D&C: Brand: MEDLINE INDUSTRIES, INC.

## (undated) DEVICE — BW-412T DISP COMBO CLEANING BRUSH: Brand: SINGLE USE COMBINATION CLEANING BRUSH

## (undated) DEVICE — SUTURE CHROMIC GUT SZ 3-0 L27IN ABSRB BRN L26MM SH 1/2 CIR G122H

## (undated) DEVICE — GOWN AURORA NONREINF LG: Brand: MEDLINE INDUSTRIES, INC.

## (undated) DEVICE — 60 ML SYRINGE,REGULAR TIP: Brand: MONOJECT

## (undated) DEVICE — MOUTHPIECE ENDOSCP L CTRL OPN AND SIDE PORTS DISP

## (undated) DEVICE — FORCEPS BX L240CM WRK CHN 2.8MM STD CAP W/ NDL MIC MESH

## (undated) DEVICE — CATHETER URETH 16FR RED RUB INTMIT ALL PURP 12 PER CA